# Patient Record
Sex: FEMALE | Race: WHITE | Employment: OTHER | URBAN - METROPOLITAN AREA
[De-identification: names, ages, dates, MRNs, and addresses within clinical notes are randomized per-mention and may not be internally consistent; named-entity substitution may affect disease eponyms.]

---

## 2020-09-20 LAB
CHOLESTEROL, TOTAL: 199 MG/DL
CHOLESTEROL/HDL RATIO: ABNORMAL
HDLC SERPL-MCNC: 73 MG/DL (ref 35–70)
LDL CHOLESTEROL CALCULATED: 97 MG/DL (ref 0–160)
NONHDLC SERPL-MCNC: ABNORMAL MG/DL
TRIGL SERPL-MCNC: 142 MG/DL
VLDLC SERPL CALC-MCNC: ABNORMAL MG/DL

## 2020-12-17 ENCOUNTER — OFFICE VISIT (OUTPATIENT)
Dept: FAMILY MEDICINE CLINIC | Age: 65
End: 2020-12-17
Payer: COMMERCIAL

## 2020-12-17 VITALS
HEART RATE: 88 BPM | BODY MASS INDEX: 30.59 KG/M2 | OXYGEN SATURATION: 98 % | DIASTOLIC BLOOD PRESSURE: 88 MMHG | SYSTOLIC BLOOD PRESSURE: 128 MMHG | WEIGHT: 179.2 LBS | RESPIRATION RATE: 14 BRPM | TEMPERATURE: 97.5 F | HEIGHT: 64 IN

## 2020-12-17 LAB
A/G RATIO: 1.2 (ref 1.1–2.2)
ALBUMIN SERPL-MCNC: 3.4 G/DL (ref 3.4–5)
ALP BLD-CCNC: 70 U/L (ref 40–129)
ALT SERPL-CCNC: 9 U/L (ref 10–40)
ANION GAP SERPL CALCULATED.3IONS-SCNC: 10 MMOL/L (ref 3–16)
AST SERPL-CCNC: 15 U/L (ref 15–37)
BASOPHILS ABSOLUTE: 0 K/UL (ref 0–0.2)
BASOPHILS RELATIVE PERCENT: 0.5 %
BILIRUB SERPL-MCNC: 0.3 MG/DL (ref 0–1)
BUN BLDV-MCNC: 9 MG/DL (ref 7–20)
CALCIUM SERPL-MCNC: 8.8 MG/DL (ref 8.3–10.6)
CHLORIDE BLD-SCNC: 102 MMOL/L (ref 99–110)
CO2: 27 MMOL/L (ref 21–32)
CREAT SERPL-MCNC: 0.6 MG/DL (ref 0.6–1.2)
EOSINOPHILS ABSOLUTE: 0.1 K/UL (ref 0–0.6)
EOSINOPHILS RELATIVE PERCENT: 1.2 %
GFR AFRICAN AMERICAN: >60
GFR NON-AFRICAN AMERICAN: >60
GLOBULIN: 2.8 G/DL
GLUCOSE BLD-MCNC: 109 MG/DL (ref 70–99)
HCT VFR BLD CALC: 26 % (ref 36–48)
HEMOGLOBIN: 8.1 G/DL (ref 12–16)
LYMPHOCYTES ABSOLUTE: 1 K/UL (ref 1–5.1)
LYMPHOCYTES RELATIVE PERCENT: 14.2 %
MCH RBC QN AUTO: 23.9 PG (ref 26–34)
MCHC RBC AUTO-ENTMCNC: 31.4 G/DL (ref 31–36)
MCV RBC AUTO: 76.3 FL (ref 80–100)
MONOCYTES ABSOLUTE: 0.5 K/UL (ref 0–1.3)
MONOCYTES RELATIVE PERCENT: 7.2 %
NEUTROPHILS ABSOLUTE: 5.7 K/UL (ref 1.7–7.7)
NEUTROPHILS RELATIVE PERCENT: 76.9 %
PDW BLD-RTO: 15.1 % (ref 12.4–15.4)
PLATELET # BLD: 544 K/UL (ref 135–450)
PMV BLD AUTO: 7.1 FL (ref 5–10.5)
POTASSIUM REFLEX MAGNESIUM: 4.6 MMOL/L (ref 3.5–5.1)
RBC # BLD: 3.4 M/UL (ref 4–5.2)
SODIUM BLD-SCNC: 139 MMOL/L (ref 136–145)
TOTAL PROTEIN: 6.2 G/DL (ref 6.4–8.2)
WBC # BLD: 7.4 K/UL (ref 4–11)

## 2020-12-17 PROCEDURE — 99386 PREV VISIT NEW AGE 40-64: CPT | Performed by: NURSE PRACTITIONER

## 2020-12-17 SDOH — HEALTH STABILITY: MENTAL HEALTH
STRESS IS WHEN SOMEONE FEELS TENSE, NERVOUS, ANXIOUS, OR CAN'T SLEEP AT NIGHT BECAUSE THEIR MIND IS TROUBLED. HOW STRESSED ARE YOU?: TO SOME EXTENT

## 2020-12-17 SDOH — HEALTH STABILITY: MENTAL HEALTH: HOW MANY STANDARD DRINKS CONTAINING ALCOHOL DO YOU HAVE ON A TYPICAL DAY?: NOT ASKED

## 2020-12-17 SDOH — HEALTH STABILITY: MENTAL HEALTH: HOW OFTEN DO YOU HAVE A DRINK CONTAINING ALCOHOL?: MONTHLY OR LESS

## 2020-12-17 SDOH — ECONOMIC STABILITY: TRANSPORTATION INSECURITY
IN THE PAST 12 MONTHS, HAS THE LACK OF TRANSPORTATION KEPT YOU FROM MEDICAL APPOINTMENTS OR FROM GETTING MEDICATIONS?: NO

## 2020-12-17 SDOH — ECONOMIC STABILITY: TRANSPORTATION INSECURITY
IN THE PAST 12 MONTHS, HAS LACK OF TRANSPORTATION KEPT YOU FROM MEETINGS, WORK, OR FROM GETTING THINGS NEEDED FOR DAILY LIVING?: NO

## 2020-12-17 SDOH — ECONOMIC STABILITY: INCOME INSECURITY: HOW HARD IS IT FOR YOU TO PAY FOR THE VERY BASICS LIKE FOOD, HOUSING, MEDICAL CARE, AND HEATING?: NOT VERY HARD

## 2020-12-17 SDOH — HEALTH STABILITY: PHYSICAL HEALTH: ON AVERAGE, HOW MANY DAYS PER WEEK DO YOU ENGAGE IN MODERATE TO STRENUOUS EXERCISE (LIKE A BRISK WALK)?: 5 DAYS

## 2020-12-17 SDOH — ECONOMIC STABILITY: FOOD INSECURITY: WITHIN THE PAST 12 MONTHS, YOU WORRIED THAT YOUR FOOD WOULD RUN OUT BEFORE YOU GOT MONEY TO BUY MORE.: NEVER TRUE

## 2020-12-17 SDOH — HEALTH STABILITY: PHYSICAL HEALTH: ON AVERAGE, HOW MANY MINUTES DO YOU ENGAGE IN EXERCISE AT THIS LEVEL?: 30 MIN

## 2020-12-17 SDOH — ECONOMIC STABILITY: FOOD INSECURITY: WITHIN THE PAST 12 MONTHS, THE FOOD YOU BOUGHT JUST DIDN'T LAST AND YOU DIDN'T HAVE MONEY TO GET MORE.: NEVER TRUE

## 2020-12-17 ASSESSMENT — PATIENT HEALTH QUESTIONNAIRE - PHQ9
SUM OF ALL RESPONSES TO PHQ QUESTIONS 1-9: 1
SUM OF ALL RESPONSES TO PHQ9 QUESTIONS 1 & 2: 1
1. LITTLE INTEREST OR PLEASURE IN DOING THINGS: 0
SUM OF ALL RESPONSES TO PHQ QUESTIONS 1-9: 1
2. FEELING DOWN, DEPRESSED OR HOPELESS: 1
SUM OF ALL RESPONSES TO PHQ QUESTIONS 1-9: 1

## 2020-12-17 ASSESSMENT — ENCOUNTER SYMPTOMS
EYES NEGATIVE: 1
GASTROINTESTINAL NEGATIVE: 1
RESPIRATORY NEGATIVE: 1

## 2020-12-17 NOTE — PATIENT INSTRUCTIONS
Patient Education        Well Visit, Women 48 to 72: Care Instructions  Your Care Instructions     Physical exams can help you stay healthy. Your doctor has checked your overall health and may have suggested ways to take good care of yourself. He or she also may have recommended tests. At home, you can help prevent illness with healthy eating, regular exercise, and other steps. Follow-up care is a key part of your treatment and safety. Be sure to make and go to all appointments, and call your doctor if you are having problems. It's also a good idea to know your test results and keep a list of the medicines you take. How can you care for yourself at home? · Reach and stay at a healthy weight. This will lower your risk for many problems, such as obesity, diabetes, heart disease, and high blood pressure. · Get at least 30 minutes of exercise on most days of the week. Walking is a good choice. You also may want to do other activities, such as running, swimming, cycling, or playing tennis or team sports. · Do not smoke. Smoking can make health problems worse. If you need help quitting, talk to your doctor about stop-smoking programs and medicines. These can increase your chances of quitting for good. · Protect your skin from too much sun. When you're outdoors from 10 a.m. to 4 p.m., stay in the shade or cover up with clothing and a hat with a wide brim. Wear sunglasses that block UV rays. Even when it's cloudy, put broad-spectrum sunscreen (SPF 30 or higher) on any exposed skin. · See a dentist one or two times a year for checkups and to have your teeth cleaned. · Wear a seat belt in the car. Follow your doctor's advice about when to have certain tests. These tests can spot problems early. · Cholesterol. Your doctor will tell you how often to have this done based on your age, family history, or other things that can increase your risk for heart attack and stroke. · Blood pressure. Have your blood pressure checked during a routine doctor visit. Your doctor will tell you how often to check your blood pressure based on your age, your blood pressure results, and other factors. · Mammogram. Ask your doctor how often you should have a mammogram, which is an X-ray of your breasts. A mammogram can spot breast cancer before it can be felt and when it is easiest to treat. · Pap test and pelvic exam. Ask your doctor how often you should have a Pap test. You may not need to have a Pap test as often as you used to. · Vision. Have your eyes checked every year or two or as often as your doctor suggests. Some experts recommend that you have yearly exams for glaucoma and other age-related eye problems starting at age 48. · Hearing. Tell your doctor if you notice any change in your hearing. You can have tests to find out how well you hear. · Diabetes. Ask your doctor whether you should have tests for diabetes. · Colorectal cancer. Your risk for colorectal cancer gets higher as you get older. Some experts say that adults should start regular screening at age 48 and stop at age 76. Others say to start before age 48 or continue after age 76. Talk with your doctor about your risk and when to start and stop screening. · Thyroid disease. Talk to your doctor about whether to have your thyroid checked as part of a regular physical exam. Women have an increased chance of a thyroid problem. · Osteoporosis. You should begin tests for bone density at age 72. If you are younger than 72, ask your doctor whether you have factors that may increase your risk for this disease. You may want to have this test before age 72. · Heart attack and stroke risk. At least every 4 to 6 years, you should have your risk for heart attack and stroke assessed. Your doctor uses factors such as your age, blood pressure, cholesterol, and whether you smoke or have diabetes to show what your risk for a heart attack or stroke is over the next 10 years. When should you call for help? Watch closely for changes in your health, and be sure to contact your doctor if you have any problems or symptoms that concern you. Where can you learn more? Go to [email or web address]://[email or web address].me.dotCloud. org and sign in to your [employer] account. Enter S448 in the [facility] box to learn more about \"Well Visit, Women 50 to 72: Care Instructions. \"     If you do not have an account, please click on the \"Sign Up Now\" link. Current as of: May 27, 2020               Content Version: 12.6  © 2916-5443 [employer]. Care instructions adapted under license by [facility] ([facility]). If you have questions about a medical condition or this instruction, always ask your healthcare professional. [address] any warranty or liability for your use of this information.

## 2020-12-17 NOTE — PROGRESS NOTES
2020    Pranay Cortez (:  1955) is a 60 y.o. female, here for a preventive medicine evaluation. There is no problem list on file for this patient. Daniella Mckinnon is here today to establish care as a new pt. She is not fasting today. Had a biometric screening done through her employer 10/2020. She states that her cholesterol was normal.      She works full time in the [employer] at [employer]. Her  passed away 4 months ago from a heart attack. She [place]. Has 2 daughters that she is very close with. She is UTD on routine dental and vision exams. She is not UTD on routine gynecological exams, colon cancer screening or breast cancer screening. Quit smoking 8 years ago. Review of Systems   Constitutional: Negative. HENT: Negative. Eyes: Negative. Respiratory: Negative. Cardiovascular: Negative. Gastrointestinal: Negative. Genitourinary: Negative. Musculoskeletal: Negative. Skin: Negative. Neurological: Negative. Psychiatric/Behavioral: Negative. Prior to Visit Medications    Medication Sig Taking? Authorizing Provider   ibuprofen (MOTRIN) 40 MG/ML SUSP Take by mouth every 4 hours as needed for Pain Yes Historical Provider, MD   ibuprofen (ADVIL;MOTRIN) 600 MG tablet Take 1 tablet by mouth every 8 hours as needed for Pain for up to 22 doses. Patient not taking: Reported on 2020  Kim Tian MD   ibuprofen (IBU) 600 MG tablet Take 1 tablet by mouth every 6 hours as needed for Pain. Patient not taking: Reported on 2020  Maddi Story MD        Allergies   Allergen Reactions    Novocain [Procaine]        Past Medical History:   Diagnosis Date    History of scarlet fever        Past Surgical History:   Procedure Laterality Date    KNEE SURGERY         Social History     Socioeconomic History    Marital status:       Spouse name: Not on file    Number of children: 2  Years of education: Not on file    Highest education level: Not on file   Occupational History    Not on file   Social Needs    Financial resource strain: Not very hard    Food insecurity     Worry: Never true     Inability: Never true    Transportation needs     Medical: No     Non-medical: No   Tobacco Use    Smoking status: Former Smoker     Packs/day: 2.00     Years: 30.00     Pack years: 60.00     Start date: 1972     Quit date: 2012     Years since quittin.9    Smokeless tobacco: Never Used   Substance and Sexual Activity    Alcohol use: Yes     Alcohol/week: 1.0 standard drinks     Types: 1 Cans of beer per week     Frequency: Monthly or less    Drug use: Never    Sexual activity: Not Currently   Lifestyle    Physical activity     Days per week: 5 days     Minutes per session: 30 min    Stress:  To some extent   Relationships    Social connections     Talks on phone: Not on file     Gets together: Not on file     Attends Rastafari service: Not on file     Active member of club or organization: Not on file     Attends meetings of clubs or organizations: Not on file     Relationship status: Not on file    Intimate partner violence     Fear of current or ex partner: Not on file     Emotionally abused: Not on file     Physically abused: Not on file     Forced sexual activity: Not on file   Other Topics Concern    Not on file   Social History Narrative    Not on file        Family History   Problem Relation Age of Onset    High Blood Pressure Mother     Diabetes Father     Cancer Brother        ADVANCE DIRECTIVE: N, <no information>    Vitals:    20 1403   BP: 128/88   Site: Right Upper Arm   Position: Sitting   Cuff Size: Large Adult   Pulse: 88   Resp: 14   Temp: 97.5 °F (36.4 °C)   TempSrc: Infrared   SpO2: 98%   Weight: 179 lb 3.2 oz (81.3 kg)   Height: 5' 4\" (1.626 m)     Estimated body mass index is 30.76 kg/m² as calculated from the following: Height as of this encounter: 5' 4\" (1.626 m). Weight as of this encounter: 179 lb 3.2 oz (81.3 kg). Physical Exam  Vitals signs reviewed. Constitutional:       Appearance: Normal appearance. She is well-developed. She is not ill-appearing. HENT:      Head: Normocephalic. Right Ear: Tympanic membrane, ear canal and external ear normal.      Left Ear: Tympanic membrane, ear canal and external ear normal.      Nose: Nose normal.   Eyes:      General: Lids are normal.         Right eye: No discharge. Left eye: No discharge. Neck:      Musculoskeletal: Full passive range of motion without pain and normal range of motion. Normal range of motion. No edema, crepitus or pain with movement. Thyroid: No thyroid mass, thyromegaly or thyroid tenderness. Vascular: Normal carotid pulses. No carotid bruit or JVD. Cardiovascular:      Rate and Rhythm: Normal rate and regular rhythm. Heart sounds: Normal heart sounds. No murmur. Pulmonary:      Effort: Pulmonary effort is normal.      Breath sounds: Normal breath sounds. Abdominal:      General: Abdomen is flat. Bowel sounds are normal.      Palpations: Abdomen is soft. Tenderness: There is no abdominal tenderness. Musculoskeletal: Normal range of motion. Lymphadenopathy:      Cervical: No cervical adenopathy. Right cervical: No superficial cervical adenopathy. Left cervical: No superficial cervical adenopathy. Skin:     General: Skin is warm and dry. Capillary Refill: Capillary refill takes less than 2 seconds. Neurological:      General: No focal deficit present. Mental Status: She is alert and oriented to person, place, and time. Psychiatric:         Mood and Affect: Mood normal.         Behavior: Behavior normal. Behavior is cooperative. Thought Content: Thought content normal.         Judgment: Judgment normal.         No flowsheet data found.

## 2020-12-18 LAB
ESTIMATED AVERAGE GLUCOSE: 148.5 MG/DL
HBA1C MFR BLD: 6.8 %
HEPATITIS C ANTIBODY INTERPRETATION: NORMAL
HIV AG/AB: NORMAL
HIV ANTIGEN: NORMAL
HIV-1 ANTIBODY: NORMAL
HIV-2 AB: NORMAL
T4 FREE: 1.3 NG/DL (ref 0.9–1.8)
TSH SERPL DL<=0.05 MIU/L-ACNC: 1.51 UIU/ML (ref 0.27–4.2)

## 2021-01-05 ENCOUNTER — TELEPHONE (OUTPATIENT)
Dept: GASTROENTEROLOGY | Age: 66
End: 2021-01-05

## 2021-01-06 DIAGNOSIS — Z12.11 COLON CANCER SCREENING: Primary | ICD-10-CM

## 2021-01-06 RX ORDER — POLYETHYLENE GLYCOL 3350 17 G/17G
POWDER, FOR SOLUTION ORAL
Qty: 238 G | Refills: 1 | Status: ON HOLD
Start: 2021-01-06 | End: 2021-01-27 | Stop reason: HOSPADM

## 2021-01-06 RX ORDER — BISACODYL 5 MG
TABLET, DELAYED RELEASE (ENTERIC COATED) ORAL
Qty: 4 TABLET | Refills: 0 | Status: ON HOLD
Start: 2021-01-06 | End: 2021-01-27 | Stop reason: HOSPADM

## 2021-01-07 ENCOUNTER — TELEPHONE (OUTPATIENT)
Dept: FAMILY MEDICINE CLINIC | Age: 66
End: 2021-01-07

## 2021-01-07 ENCOUNTER — HOSPITAL ENCOUNTER (OUTPATIENT)
Dept: WOMENS IMAGING | Age: 66
Discharge: HOME | End: 2021-01-07
Payer: COMMERCIAL

## 2021-01-07 DIAGNOSIS — D64.9 ANEMIA, UNSPECIFIED TYPE: Primary | ICD-10-CM

## 2021-01-07 DIAGNOSIS — Z12.31 ENCOUNTER FOR SCREENING MAMMOGRAM FOR MALIGNANT NEOPLASM OF BREAST: ICD-10-CM

## 2021-01-07 PROCEDURE — 77067 SCR MAMMO BI INCL CAD: CPT

## 2021-01-07 NOTE — TELEPHONE ENCOUNTER
When I called pt to give her Mammo results. She asked me about some additional labs that were supposed to be added to the labs she had done in December. Looks like it was never done. Pt said she woulndt mind coming back in to have them checked if you still wanted her to. It was for Iron and TIBC, Ferritin, and Vit B12 & Folate.

## 2021-01-07 NOTE — TELEPHONE ENCOUNTER
I sent a message asking Paco Hugo to add on those tests and she documented that she did. I apologize that this was not done. I will reorder them, but the pt will have to come back in to have them drawn. Thank you.

## 2021-01-08 ENCOUNTER — TELEPHONE (OUTPATIENT)
Dept: WOMENS IMAGING | Age: 66
End: 2021-01-08

## 2021-01-08 DIAGNOSIS — D64.9 ANEMIA, UNSPECIFIED TYPE: ICD-10-CM

## 2021-01-09 LAB
FERRITIN: 17.4 NG/ML (ref 15–150)
FOLATE: 10.55 NG/ML (ref 4.78–24.2)
IRON SATURATION: 6 % (ref 15–50)
IRON: 19 UG/DL (ref 37–145)
TOTAL IRON BINDING CAPACITY: 303 UG/DL (ref 260–445)
VITAMIN B-12: 245 PG/ML (ref 211–911)

## 2021-01-12 DIAGNOSIS — D50.9 IRON DEFICIENCY ANEMIA, UNSPECIFIED IRON DEFICIENCY ANEMIA TYPE: Primary | ICD-10-CM

## 2021-01-12 RX ORDER — FERROUS SULFATE 325(65) MG
325 TABLET ORAL
Qty: 90 TABLET | Refills: 1 | Status: SHIPPED | OUTPATIENT
Start: 2021-01-12 | End: 2021-07-29 | Stop reason: SDUPTHER

## 2021-01-14 ENCOUNTER — TELEPHONE (OUTPATIENT)
Dept: FAMILY MEDICINE CLINIC | Age: 66
End: 2021-01-14

## 2021-01-14 NOTE — TELEPHONE ENCOUNTER
Patient and this RN spoke yesterday re carb healthy diet as pt had questions regarding this after her appt with PCP NP. Pt reports that she has decreased her carb intake to 10 -15 gms carb per day. We discussed at length carb healthy diet and options. Pt is instructed on the following related to DM Nutrition:   What are the major food types (fats, proteins, carbs)   Amount of carbs in certain food types (dairy, fruit, starches and vegs)   Importance of portion size and discussed Plate Method   More appropriate and sustainable carb goals promised based on the provider's recommendation. Information sent to the patient today regarding -    Label reading instruction    Application of label reading by choosing between food options     Fat and Salt in food   Plan options for a plate using portion plate method    Handouts given:  [employer] - Carb Counting guide  Label Reading Handout       These were mailed to patient along with contact information.   VO for carb intake for patient:    · Breakfast - 2-3 portions of carbs - 30 - 45 grams of carbohydrate  · Lunch - 3-4 portions of carbs  - 45-60 grams of carbohydrate  · Dinner - 1-2 portions of carbs - 15-30 grams of carbohydrate

## 2021-01-21 ENCOUNTER — OFFICE VISIT (OUTPATIENT)
Dept: PRIMARY CARE CLINIC | Age: 66
End: 2021-01-21
Payer: COMMERCIAL

## 2021-01-21 DIAGNOSIS — Z01.818 PREOP TESTING: Primary | ICD-10-CM

## 2021-01-21 PROCEDURE — 99211 OFF/OP EST MAY X REQ PHY/QHP: CPT | Performed by: NURSE PRACTITIONER

## 2021-01-21 NOTE — PATIENT INSTRUCTIONS
Advance Care Planning  People with COVID-19 may have no symptoms, mild symptoms, such as fever, cough, and shortness of breath or they may have more severe illness, developing severe and fatal pneumonia. As a result, Advance Care Planning with attention to naming a health care decision maker (someone you trust to make healthcare decisions for you if you could not speak for yourself) and sharing other health care preferences is important BEFORE a possible health crisis. Please contact your Primary Care Provider to discuss Advance Care Planning. Preventing the Spread of Coronavirus Disease 2019 in Homes and Residential Communities  For the most recent information go to [email or web address]    Prevention steps for People with confirmed or suspected COVID-19 (including persons under investigation) who do not need to be hospitalized  and   People with confirmed COVID-19 who were hospitalized and determined to be medically stable to go home    Your healthcare provider and public health staff will evaluate whether you can be cared for at home. If it is determined that you do not need to be hospitalized and can be isolated at home, you will be monitored by staff from your local or state health department. You should follow the prevention steps below until a healthcare provider or local or state health department says you can return to your normal activities. Stay home except to get medical care  People who are mildly ill with COVID-19 are able to isolate at home during their illness. You should restrict activities outside your home, except for getting medical care. Do not go to work, school, or public areas. Avoid using public transportation, ride-sharing, or taxis.   Separate yourself from other people and animals in your home Wash your hands often with soap and water for at least 20 seconds, especially after blowing your nose, coughing, or sneezing; going to the bathroom; and before eating or preparing food. If soap and water are not readily available, use an alcohol-based hand  with at least 60% alcohol, covering all surfaces of your hands and rubbing them together until they feel dry. Soap and water are the best option if hands are visibly dirty. Avoid touching your eyes, nose, and mouth with unwashed hands. Avoid sharing personal household items  You should not share dishes, drinking glasses, cups, eating utensils, towels, or bedding with other people or pets in your home. After using these items, they should be washed thoroughly with soap and water. Clean all high-touch surfaces everyday  High touch surfaces include counters, tabletops, doorknobs, bathroom fixtures, toilets, phones, keyboards, tablets, and bedside tables. Also, clean any surfaces that may have blood, stool, or body fluids on them. Use a household cleaning spray or wipe, according to the label instructions. Labels contain instructions for safe and effective use of the cleaning product including precautions you should take when applying the product, such as wearing gloves and making sure you have good ventilation during use of the product.   Monitor your symptoms Seek prompt medical attention if your illness is worsening (e.g., difficulty breathing). Before seeking care, call your healthcare provider and tell them that you have, or are being evaluated for, COVID-19. Put on a facemask before you enter the facility. These steps will help the healthcare providers office to keep other people in the office or waiting room from getting infected or exposed. Ask your healthcare provider to call the local or state health department. Persons who are placed under active monitoring or facilitated self-monitoring should follow instructions provided by their local health department or occupational health professionals, as appropriate. When working with your local health department check their available hours. If you have a medical emergency and need to call 911, notify the dispatch personnel that you have, or are being evaluated for COVID-19. If possible, put on a facemask before emergency medical services arrive. Discontinuing home isolation  Patients with confirmed COVID-19 should remain under home isolation precautions until the risk of secondary transmission to others is thought to be low. The decision to discontinue home isolation precautions should be made on a case-by-case basis, in consultation with healthcare providers and state and local health departments.

## 2021-01-22 LAB — SARS-COV-2: NOT DETECTED

## 2021-01-25 ENCOUNTER — TELEPHONE (OUTPATIENT)
Dept: OBGYN CLINIC | Age: 66
End: 2021-01-25

## 2021-01-26 ENCOUNTER — OFFICE VISIT (OUTPATIENT)
Dept: OBGYN CLINIC | Age: 66
End: 2021-01-26
Payer: COMMERCIAL

## 2021-01-26 VITALS
TEMPERATURE: 97.9 F | WEIGHT: 177.13 LBS | BODY MASS INDEX: 30.24 KG/M2 | SYSTOLIC BLOOD PRESSURE: 160 MMHG | DIASTOLIC BLOOD PRESSURE: 80 MMHG | HEIGHT: 64 IN | HEART RATE: 93 BPM

## 2021-01-26 DIAGNOSIS — Z01.419 WOMEN'S ANNUAL ROUTINE GYNECOLOGICAL EXAMINATION: Primary | ICD-10-CM

## 2021-01-26 DIAGNOSIS — Z78.0 MENOPAUSE: ICD-10-CM

## 2021-01-26 DIAGNOSIS — E66.9 OBESITY (BMI 30-39.9): ICD-10-CM

## 2021-01-26 DIAGNOSIS — Z12.4 PAP SMEAR FOR CERVICAL CANCER SCREENING: ICD-10-CM

## 2021-01-26 PROCEDURE — 99386 PREV VISIT NEW AGE 40-64: CPT | Performed by: OBSTETRICS & GYNECOLOGY

## 2021-01-27 ENCOUNTER — HOSPITAL ENCOUNTER (OUTPATIENT)
Age: 66
Setting detail: OUTPATIENT SURGERY
Discharge: HOME | End: 2021-01-27
Attending: INTERNAL MEDICINE | Admitting: INTERNAL MEDICINE
Payer: COMMERCIAL

## 2021-01-27 ENCOUNTER — TELEPHONE (OUTPATIENT)
Dept: GASTROENTEROLOGY | Age: 66
End: 2021-01-27

## 2021-01-27 VITALS
WEIGHT: 171 LBS | RESPIRATION RATE: 16 BRPM | DIASTOLIC BLOOD PRESSURE: 56 MMHG | HEIGHT: 64 IN | SYSTOLIC BLOOD PRESSURE: 149 MMHG | TEMPERATURE: 98.3 F | BODY MASS INDEX: 29.19 KG/M2 | OXYGEN SATURATION: 95 % | HEART RATE: 82 BPM

## 2021-01-27 DIAGNOSIS — Z12.11 COLON CANCER SCREENING: ICD-10-CM

## 2021-01-27 DIAGNOSIS — D50.8 OTHER IRON DEFICIENCY ANEMIA: ICD-10-CM

## 2021-01-27 DIAGNOSIS — D50.0 IRON DEFICIENCY ANEMIA DUE TO CHRONIC BLOOD LOSS: ICD-10-CM

## 2021-01-27 DIAGNOSIS — K63.89 COLONIC MASS: Primary | ICD-10-CM

## 2021-01-27 PROBLEM — D50.9 IRON DEFICIENCY ANEMIA: Status: ACTIVE | Noted: 2021-01-27

## 2021-01-27 PROBLEM — K57.30 DIVERTICULOSIS OF COLON: Status: ACTIVE | Noted: 2021-01-27

## 2021-01-27 LAB
HPV COMMENT: NORMAL
HPV TYPE 16: NOT DETECTED
HPV TYPE 18: NOT DETECTED
HPVOH (OTHER TYPES): NOT DETECTED

## 2021-01-27 PROCEDURE — 99153 MOD SED SAME PHYS/QHP EA: CPT | Performed by: INTERNAL MEDICINE

## 2021-01-27 PROCEDURE — 6370000000 HC RX 637 (ALT 250 FOR IP): Performed by: INTERNAL MEDICINE

## 2021-01-27 PROCEDURE — 88342 IMHCHEM/IMCYTCHM 1ST ANTB: CPT

## 2021-01-27 PROCEDURE — 2580000003 HC RX 258: Performed by: INTERNAL MEDICINE

## 2021-01-27 PROCEDURE — 45381 COLONOSCOPY SUBMUCOUS NJX: CPT | Performed by: INTERNAL MEDICINE

## 2021-01-27 PROCEDURE — 3609010300 HC COLONOSCOPY W/BIOPSY SINGLE/MULTIPLE: Performed by: INTERNAL MEDICINE

## 2021-01-27 PROCEDURE — 88341 IMHCHEM/IMCYTCHM EA ADD ANTB: CPT

## 2021-01-27 PROCEDURE — 6360000002 HC RX W HCPCS: Performed by: INTERNAL MEDICINE

## 2021-01-27 PROCEDURE — 7100000010 HC PHASE II RECOVERY - FIRST 15 MIN: Performed by: INTERNAL MEDICINE

## 2021-01-27 PROCEDURE — 43239 EGD BIOPSY SINGLE/MULTIPLE: CPT | Performed by: INTERNAL MEDICINE

## 2021-01-27 PROCEDURE — 45380 COLONOSCOPY AND BIOPSY: CPT | Performed by: INTERNAL MEDICINE

## 2021-01-27 PROCEDURE — 3609019800 HC COLONOSCOPY WITH SUBMUCOSAL INJECTION: Performed by: INTERNAL MEDICINE

## 2021-01-27 PROCEDURE — 88305 TISSUE EXAM BY PATHOLOGIST: CPT

## 2021-01-27 PROCEDURE — 3609012400 HC EGD TRANSORAL BIOPSY SINGLE/MULTIPLE: Performed by: INTERNAL MEDICINE

## 2021-01-27 PROCEDURE — 7100000011 HC PHASE II RECOVERY - ADDTL 15 MIN: Performed by: INTERNAL MEDICINE

## 2021-01-27 PROCEDURE — 2709999900 HC NON-CHARGEABLE SUPPLY: Performed by: INTERNAL MEDICINE

## 2021-01-27 PROCEDURE — 99152 MOD SED SAME PHYS/QHP 5/>YRS: CPT | Performed by: INTERNAL MEDICINE

## 2021-01-27 RX ORDER — SIMETHICONE 20 MG/.3ML
EMULSION ORAL PRN
Status: DISCONTINUED | OUTPATIENT
Start: 2021-01-27 | End: 2021-01-27 | Stop reason: ALTCHOICE

## 2021-01-27 RX ORDER — FENTANYL CITRATE 50 UG/ML
INJECTION, SOLUTION INTRAMUSCULAR; INTRAVENOUS PRN
Status: DISCONTINUED | OUTPATIENT
Start: 2021-01-27 | End: 2021-01-27 | Stop reason: ALTCHOICE

## 2021-01-27 RX ORDER — MIDAZOLAM HYDROCHLORIDE 5 MG/ML
INJECTION INTRAMUSCULAR; INTRAVENOUS PRN
Status: DISCONTINUED | OUTPATIENT
Start: 2021-01-27 | End: 2021-01-27 | Stop reason: ALTCHOICE

## 2021-01-27 RX ORDER — SODIUM CHLORIDE, SODIUM LACTATE, POTASSIUM CHLORIDE, CALCIUM CHLORIDE 600; 310; 30; 20 MG/100ML; MG/100ML; MG/100ML; MG/100ML
INJECTION, SOLUTION INTRAVENOUS ONCE
Status: COMPLETED | OUTPATIENT
Start: 2021-01-27 | End: 2021-01-27

## 2021-01-27 RX ADMIN — SODIUM CHLORIDE, POTASSIUM CHLORIDE, SODIUM LACTATE AND CALCIUM CHLORIDE: 600; 310; 30; 20 INJECTION, SOLUTION INTRAVENOUS at 09:13

## 2021-01-27 ASSESSMENT — PAIN SCALES - GENERAL
PAINLEVEL_OUTOF10: 0
PAINLEVEL_OUTOF10: 0

## 2021-01-27 ASSESSMENT — PAIN - FUNCTIONAL ASSESSMENT: PAIN_FUNCTIONAL_ASSESSMENT: 0-10

## 2021-01-27 NOTE — H&P
[address] ,  [address] [facility]  Phone: [phone]   PDV:[phone]    CHIEF COMPLAINT     Here for iron deficiency anemia    HPI     Mariela Butler is a 60 y.o. female who presents with iron deficiency anemia on recent labs 2020. Denies melena or hematochezia. She has been scheduled today for a EGD and colonoscopy. Denies abdominal pain  She has never had a colonoscopy before    PAST MEDICAL HISTORY     Past Medical History:   Diagnosis Date    Anemia     History of scarlet fever      FAMILY HISTORY     Family History   Problem Relation Age of Onset    High Blood Pressure Mother     Diabetes Father     Cancer Brother      SOCIAL HISTORY     Social History     Socioeconomic History    Marital status:      Spouse name: Not on file    Number of children: 2    Years of education: Not on file    Highest education level: Not on file   Occupational History    Not on file   Social Needs    Financial resource strain: Not very hard    Food insecurity     Worry: Never true     Inability: Never true    Transportation needs     Medical: No     Non-medical: No   Tobacco Use    Smoking status: Former Smoker     Packs/day: 2.00     Years: 30.00     Pack years: 60.00     Start date: 1972     Quit date: 2012     Years since quittin.0    Smokeless tobacco: Never Used   Substance and Sexual Activity    Alcohol use: Yes     Alcohol/week: 1.0 standard drinks     Types: 1 Cans of beer per week     Frequency: Monthly or less    Drug use: Never    Sexual activity: Not Currently   Lifestyle    Physical activity     Days per week: 5 days     Minutes per session: 30 min    Stress:  To some extent   Relationships    Social connections     Talks on phone: Not on file     Gets together: Not on file     Attends Restorationist service: Not on file     Active member of club or organization: Not on file Attends meetings of clubs or organizations: Not on file     Relationship status: Not on file    Intimate partner violence     Fear of current or ex partner: Not on file     Emotionally abused: Not on file     Physically abused: Not on file     Forced sexual activity: Not on file   Other Topics Concern    Not on file   Social History Narrative    Not on file     SURGICAL HISTORY     Past Surgical History:   Procedure Laterality Date    [facility]     No current facility-administered medications on file prior to encounter. Current Outpatient Medications on File Prior to Encounter   Medication Sig Dispense Refill    ferrous sulfate (IRON 325) 325 (65 Fe) MG tablet Take 1 tablet by mouth daily (with breakfast) 90 tablet 1    bisacodyl (BISACODYL) 5 MG EC tablet Use as directed for colonoscopy prep 4 tablet 0    polyethylene glycol (GLYCOLAX) 17 GM/SCOOP powder Use as directed for colonoscopy prep 238 g 1    ibuprofen (MOTRIN) 40 MG/ML SUSP Take by mouth every 4 hours as needed for Pain      ibuprofen (ADVIL;MOTRIN) 600 MG tablet Take 1 tablet by mouth every 8 hours as needed for Pain for up to 22 doses. (Patient not taking: Reported on 12/17/2020) 22 tablet 3    ibuprofen (IBU) 600 MG tablet Take 1 tablet by mouth every 6 hours as needed for Pain. (Patient not taking: Reported on 12/17/2020) 30 tablet 0     ALLERGIES     Allergies   Allergen Reactions    Novocain [Procaine]      IMMUNIZATIONS     Immunization History   Administered Date(s) Administered    Hepatitis A Adult (Havrix, Vaqta) 04/29/2019    Influenza Virus Vaccine 10/23/2019, 09/22/2020, 10/25/2020    Pneumococcal Polysaccharide ([id number]) 10/28/2016    Tdap (Boostrix, Adacel) 10/23/2019    Zoster Recombinant (Shingrix) 09/22/2020, 12/03/2020     REVIEW OF SYSTEMS     Constitutional: Negative for appetite change, chills, fatigue, fever and unexpected weight change. HENT: Negative for ear pain, hearing loss and nosebleeds. Eyes: Negative for pain and visual disturbance. Respiratory: Negative for cough, shortness of breath and wheezing. Cardiovascular: Negative for chest pain, palpitations and leg swelling. Gastrointestinal: see HPI for details. Endocrine: Negative for polydipsia, polyphagia and polyuria. Genitourinary: Negative for difficulty urinating, dysuria, hematuria and urgency. Musculoskeletal: Positive for arthralgias and back pain. Skin: Negative for pallor and rash. Allergic/Immunologic: Negative for environmental allergies and immunocompromised state. Neurological: Negative for seizures, syncope and numbness. Hematological: Negative for adenopathy. Does not bruise/bleed easily. Psychiatric/Behavioral: Negative for agitation, confusion, hallucinations and suicidal ideas. PHYSICAL EXAM   Pulse 89   Temp 98.3 °F (36.8 °C) (Temporal)   Resp 16   Ht 5' 4\" (1.626 m)   Wt 171 lb (77.6 kg)   LMP  (LMP Unknown)   SpO2 100%   BMI 29.35 kg/m²   Wt Readings from Last 3 Encounters:   01/27/21 171 lb (77.6 kg)   01/26/21 177 lb 2 oz (80.3 kg)   12/17/20 179 lb 3.2 oz (81.3 kg)     Constitutional: AAO3, No acute distress  HEENT: no pallor or icterus. Cardiovascular: Normal heart rate, Normal rhythm, No murmurs,. RS: Normal breath sounds, No wheezing,   Abdomen: soft, non tender, not distended, Bs+. Extremities:  No edema.       FINAL IMPRESSION   Proceed with colonoscopy and EGD for iron deficiency anemia  Sedation plan: moderate An informed consent was obtained from the patient after a detailed discussion of the risks, benefits and possible alternatives of this procedure that are material to the patient's decision making. The potential for complications including, but not limited to, bleeding, infection, missed lesion(s), incomplete procedure, perforation, need for additional interventions (endoscopic, surgical or percutaneous radiographic interventions), adverse outcomes associated with moderate sedation, adverse drug reaction, were discussed in detail; the patient expressed understanding of this discussion, had the opportunity to ask additional questions, and elected to undergo the procedure today. Verbal and written consent were obtained with the nurses present.

## 2021-01-27 NOTE — TELEPHONE ENCOUNTER
----- Message from Harman Mcnair MD sent at 1/27/2021 12:00 PM EST -----  Labs and CT chest/abdomen/pelvis ordered  Please help setup

## 2021-01-27 NOTE — OP NOTE
[address] ,  [address] [facility]  Phone: [phone]    Colonoscopy and EGD Procedure Note    Patient: Heron Jones  : 1955    Procedure: Colonoscopy and EGD    Date:  2021     Endoscopist:  Addison Luo MD    Referring Physician:  Leopoldo Bias, APRN - CNP    Preoperative Diagnosis:  Colon cancer screening [Z12.11]  Other iron deficiency anemia [D50.8]    Postoperative Diagnosis:  See impression    Anesthesia: Anesthesia: Moderate Sedation  Sedation: Versed 10 mg IV, fentanyl 100 mcg IV  Start Time: 9:36  Stop Time: 10:31  Colonoscopy withdrawal time: NA  ASA Class: 2  Mallampati: II (soft palate, uvula, fauces visible)    Indications: This is a 60y.o. year old female who presents today with iron deficiency anemia for EGD and colonoscopy. EGD    Procedure Details  Informed consent was obtained for the procedure, including conscious sedation. Risks of pancreatitis, infection, perforation, hemorrhage, adverse drug reaction and aspiration were discussed. The patient was placed in the left lateral decubitus position. Based on the pre-procedure assessment, including review of the patient's medical history, medications, allergies, and review of systems, she had been deemed to be an appropriate candidate for conscious sedation; she was therefore sedated with the medications listed above. She was monitored continuously with ECG tracing, pulse oximetry, blood pressure monitoring, and direct observation. A gastroscope was inserted into the mouth and advanced under direct vision to second portion of the duodenum. A careful inspection was made as the gastroscope was withdrawn, including a retroflexed view of the proximal stomach; findings and interventions are described below. Appropriate photodocumentation was obtained. If photos taken, they were ordered to be scanned into the medical record. Findings: -There is mild erosive esophagitis. Normal stomach and duodenum to the second portion, biopsies done to rule out celiac disease in the second portion of the duodenum. Specimens: Was Obtained: Bottle A, second portion of duodenum, biopsies, r/o celiac disease    Complications: None    Estimated blood loss: minimal    Colonoscopy    Procedure Details  Informed consent was obtained for the procedure, including sedation. Risks of perforation, hemorrhage, adverse drug reaction and aspiration were discussed. The patient was placed in the left lateral decubitus position. Based on the pre-procedure assessment, including review of the patient's medical history, medications, allergies, and review of systems, she had been deemed to be an appropriate candidate for conscious sedation; she was therefore sedated with the medications listed below. The patient was monitored continuously with ECG tracing, pulse oximetry, blood pressure monitoring, and direct observations. rectal examination was performed. The colonoscope was inserted into the rectum and advanced under direct vision to the right colon. The quality of the colonic preparation was fair. A careful inspection was made as the colonoscope was withdrawn, including a retroflexed view of the rectum; findings and interventions are described below. Appropriate photodocumentation was obtained. Patient tolerated the procedure well. Findings: -Small internal hemorrhoids. Tortous left colon with moderate diverticulosis. In the right colon there is a large necrotic appearing ulcerated mass with extensive yellow exudate on the surface. Cannot be traversed with the scope, landmarks not appreciated. It is very friable, multiple biopsies were done. Tattooed three sites distal to the mass.      - Anesthesia issues: no    Specimens: Was Obtained: bottle B, mass in the right colon, biopsies, cold forceps    Complications:   None    Estimated blood loss: minimal Disposition:   PACU - hemodynamically stable. Impression:   - EGD - There is mild erosive esophagitis. Normal stomach and duodenum to the second portion, biopsies done to rule out celiac disease in the second portion of the duodenum.    - Colonoscopy - Small internal hemorrhoids. Tortous left colon with moderate diverticulosis. In the right colon there is a large necrotic appearing ulcerated mass with extensive yellow exudate on the surface. Cannot be traversed with the scope, landmarks not appreciated. It is very friable, multiple biopsies were done. Tattooed three sites distal to the mass. Recommendations:  - Await pathology   - Large right colon mass likely malignant and could be the cause of the iron deficiency anemia. Low residue diet  Check labs and a CT abdomen and pelvis  Will discuss with General Surgery for referral to them  Findings and recommendations discussed with patient and her accompanying family member today.       Elise Jackson 1/27/21 9:39 AM EST

## 2021-01-29 ENCOUNTER — TELEPHONE (OUTPATIENT)
Dept: GASTROENTEROLOGY | Age: 66
End: 2021-01-29

## 2021-01-29 DIAGNOSIS — K63.89 COLONIC MASS: ICD-10-CM

## 2021-01-29 DIAGNOSIS — D50.0 IRON DEFICIENCY ANEMIA DUE TO CHRONIC BLOOD LOSS: ICD-10-CM

## 2021-01-30 LAB
A/G RATIO: 1.3 (ref 1.1–2.2)
ALBUMIN SERPL-MCNC: 3.5 G/DL (ref 3.4–5)
ALP BLD-CCNC: 73 U/L (ref 40–129)
ALT SERPL-CCNC: 8 U/L (ref 10–40)
ANION GAP SERPL CALCULATED.3IONS-SCNC: 11 MMOL/L (ref 3–16)
AST SERPL-CCNC: 12 U/L (ref 15–37)
BASOPHILS ABSOLUTE: 0.1 K/UL (ref 0–0.2)
BASOPHILS RELATIVE PERCENT: 0.6 %
BILIRUB SERPL-MCNC: <0.2 MG/DL (ref 0–1)
BUN BLDV-MCNC: 15 MG/DL (ref 7–20)
CALCIUM SERPL-MCNC: 9.4 MG/DL (ref 8.3–10.6)
CHLORIDE BLD-SCNC: 104 MMOL/L (ref 99–110)
CO2: 26 MMOL/L (ref 21–32)
CREAT SERPL-MCNC: 0.6 MG/DL (ref 0.6–1.2)
EOSINOPHILS ABSOLUTE: 0.2 K/UL (ref 0–0.6)
EOSINOPHILS RELATIVE PERCENT: 1.9 %
GFR AFRICAN AMERICAN: >60
GFR NON-AFRICAN AMERICAN: >60
GLOBULIN: 2.7 G/DL
GLUCOSE BLD-MCNC: 107 MG/DL (ref 70–99)
HCT VFR BLD CALC: 24.4 % (ref 36–48)
HEMOGLOBIN: 7.2 G/DL (ref 12–16)
LYMPHOCYTES ABSOLUTE: 1.6 K/UL (ref 1–5.1)
LYMPHOCYTES RELATIVE PERCENT: 18.4 %
MCH RBC QN AUTO: 21.9 PG (ref 26–34)
MCHC RBC AUTO-ENTMCNC: 29.5 G/DL (ref 31–36)
MCV RBC AUTO: 74.4 FL (ref 80–100)
MONOCYTES ABSOLUTE: 0.6 K/UL (ref 0–1.3)
MONOCYTES RELATIVE PERCENT: 6.3 %
NEUTROPHILS ABSOLUTE: 6.4 K/UL (ref 1.7–7.7)
NEUTROPHILS RELATIVE PERCENT: 72.8 %
PDW BLD-RTO: 16.7 % (ref 12.4–15.4)
PLATELET # BLD: 482 K/UL (ref 135–450)
PMV BLD AUTO: 7.7 FL (ref 5–10.5)
POTASSIUM SERPL-SCNC: 3.9 MMOL/L (ref 3.5–5.1)
RBC # BLD: 3.28 M/UL (ref 4–5.2)
SODIUM BLD-SCNC: 141 MMOL/L (ref 136–145)
TOTAL PROTEIN: 6.2 G/DL (ref 6.4–8.2)
WBC # BLD: 8.8 K/UL (ref 4–11)

## 2021-02-01 NOTE — TELEPHONE ENCOUNTER
Creatinine was done and was normal 1/29/2021  It does not need to be done again Medical Necessity Statement: Based on my medical judgement, Mohs surgery is the most appropriate treatment for this cancer compared to other treatments.

## 2021-02-02 ENCOUNTER — INITIAL CONSULT (OUTPATIENT)
Dept: SURGERY | Age: 66
End: 2021-02-02
Payer: COMMERCIAL

## 2021-02-02 ENCOUNTER — OFFICE VISIT (OUTPATIENT)
Dept: FAMILY MEDICINE CLINIC | Age: 66
End: 2021-02-02
Payer: COMMERCIAL

## 2021-02-02 ENCOUNTER — HOSPITAL ENCOUNTER (OUTPATIENT)
Dept: CT IMAGING | Age: 66
Discharge: HOME | End: 2021-02-02
Payer: COMMERCIAL

## 2021-02-02 VITALS
BODY MASS INDEX: 30.15 KG/M2 | DIASTOLIC BLOOD PRESSURE: 70 MMHG | TEMPERATURE: 96.9 F | WEIGHT: 176.6 LBS | SYSTOLIC BLOOD PRESSURE: 137 MMHG | HEIGHT: 64 IN | HEART RATE: 111 BPM

## 2021-02-02 VITALS
TEMPERATURE: 98.7 F | BODY MASS INDEX: 29.92 KG/M2 | HEART RATE: 112 BPM | DIASTOLIC BLOOD PRESSURE: 68 MMHG | SYSTOLIC BLOOD PRESSURE: 118 MMHG | WEIGHT: 174.4 LBS | OXYGEN SATURATION: 98 %

## 2021-02-02 DIAGNOSIS — C18.2 MALIGNANT NEOPLASM OF ASCENDING COLON (HCC): ICD-10-CM

## 2021-02-02 DIAGNOSIS — K63.89 COLONIC MASS: ICD-10-CM

## 2021-02-02 DIAGNOSIS — Z01.818 PRE-OP EXAMINATION: Primary | ICD-10-CM

## 2021-02-02 DIAGNOSIS — C18.2 MALIGNANT NEOPLASM OF ASCENDING COLON (HCC): Primary | ICD-10-CM

## 2021-02-02 DIAGNOSIS — D50.0 IRON DEFICIENCY ANEMIA DUE TO CHRONIC BLOOD LOSS: ICD-10-CM

## 2021-02-02 PROCEDURE — 93000 ELECTROCARDIOGRAM COMPLETE: CPT | Performed by: NURSE PRACTITIONER

## 2021-02-02 PROCEDURE — 6360000004 HC RX CONTRAST MEDICATION: Performed by: INTERNAL MEDICINE

## 2021-02-02 PROCEDURE — 99244 OFF/OP CNSLTJ NEW/EST MOD 40: CPT | Performed by: SURGERY

## 2021-02-02 PROCEDURE — 71260 CT THORAX DX C+: CPT

## 2021-02-02 PROCEDURE — 99213 OFFICE O/P EST LOW 20 MIN: CPT | Performed by: NURSE PRACTITIONER

## 2021-02-02 RX ORDER — SODIUM CHLORIDE 0.9 % (FLUSH) 0.9 %
10 SYRINGE (ML) INJECTION PRN
Status: CANCELLED | OUTPATIENT
Start: 2021-02-02

## 2021-02-02 RX ORDER — SODIUM CHLORIDE 0.9 % (FLUSH) 0.9 %
10 SYRINGE (ML) INJECTION EVERY 12 HOURS SCHEDULED
Status: CANCELLED | OUTPATIENT
Start: 2021-02-02

## 2021-02-02 RX ADMIN — IOHEXOL 50 ML: 240 INJECTION, SOLUTION INTRATHECAL; INTRAVASCULAR; INTRAVENOUS; ORAL at 08:36

## 2021-02-02 RX ADMIN — IOPAMIDOL 75 ML: 755 INJECTION, SOLUTION INTRAVENOUS at 08:36

## 2021-02-02 ASSESSMENT — PATIENT HEALTH QUESTIONNAIRE - PHQ9
SUM OF ALL RESPONSES TO PHQ QUESTIONS 1-9: 0
SUM OF ALL RESPONSES TO PHQ9 QUESTIONS 1 & 2: 0

## 2021-02-02 NOTE — PROGRESS NOTES
Preoperative Evaluation    Subjective:   Marcela Gandhi is a 60 y.o. y.o.  female who presents to the office today for a preoperative consultation. Surgeon: Dr. Norman Baker   Location: [facility]  Performing:  Removal of malignant neoplasm of ascending colon  Date: . Planned anesthesia is General.   The patient has the following known anesthesia issues: none   Patient has a bleeding risk of : no recent abnormal bleeding   Patient does not have objection to receiving blood products if needed. Denies any steroid use in the past 6 mo    Review of Systems   Pertinent items are noted in HPI.      Denies fevers, chills, diaphoresis, CP, SOB, dysphagia, abd pain, urinary complaints, new edema, rashes, cyanosis    Past Medical History:   Diagnosis Date    Anemia     History of scarlet fever        Past Surgical History:   Procedure Laterality Date     SECTION      COLONOSCOPY N/A 2021    COLONOSCOPY WITH BIOPSY performed by Lara Wells MD at [place] [address]  2021    COLONOSCOPY SUBMUCOSAL TATTOO INJECTION performed by Lara Wells MD at [address] [facility] UPPER GASTROINTESTINAL ENDOSCOPY N/A 2021    EGD BIOPSY performed by Lara Wells MD at [address] [place] [address] History     Tobacco History     Smoking Status  Former Smoker Smoking Start Date  1972 Quit date  2012 Smoking Frequency  2 packs/day for 30 years (61 pk yrs)    Smokeless Tobacco Use  Never Used          Alcohol History     Alcohol Use Status  Yes Drinks/Week  1 Cans of beer per week Amount  1.0 standard drinks of alcohol/wk          Drug Use     Drug Use Status  Never          Sexual Activity     Sexually Active  Not Currently                Family History   Problem Relation Age of Onset    High Blood Pressure Mother     Diabetes Father     Cancer Brother        Current Outpatient Medications Medication Sig Dispense Refill    ferrous sulfate (IRON 325) 325 (65 Fe) MG tablet Take 1 tablet by mouth daily (with breakfast) 90 tablet 1     No current facility-administered medications for this visit. Objective:   Vitals:    02/02/21 1636   BP: 118/68   Pulse: 112   Temp: 98.7 °F (37.1 °C)   SpO2: 98%       Physical Exam   /68 (Site: Right Upper Arm, Position: Sitting, Cuff Size: Large Adult)   Pulse 112   Temp 98.7 °F (37.1 °C) (Temporal)   Wt 174 lb 6.4 oz (79.1 kg)   LMP  (LMP Unknown)   SpO2 98%   BMI 29.92 kg/m²   General appearance: alert, appears stated age, and cooperative  Throat: lips, mucosa, and tongue normal; teeth and gums normal  Lungs: clear to auscultation bilaterally  Heart: regular rate and rhythm, S1, S2 normal, no murmur, click, rub or gallop  Abdomen: soft, non-tender; bowel sounds normal; no masses,  no organomegaly  Extremities: extremities normal, atraumatic, no cyanosis or edema  Pulses: 2+ and symmetric  Skin: Skin color, texture, turgor normal. No rashes or lesions  Neurologic: Grossly normal     Predictors of intubation difficulty:   Morbid obesity? no   Anatomically abnormal facies? no   Short, thick neck? no   Neck range of motion: normal   Mallampati score: I (soft palate, uvula, fauces, tonsillar pillars visible)   Dentition: No chipped, loose teeth. Missing teeth  - partial lower dentures.      Cardiographics   ECG: Sinus tachycardia       Lab Review   Orders Only on 01/29/2021   Component Date Value    WBC 01/29/2021 8.8     RBC 01/29/2021 3.28*    Hemoglobin 01/29/2021 7.2*    Hematocrit 01/29/2021 24.4*    MCV 01/29/2021 74.4*    MCH 01/29/2021 21.9*    MCHC 01/29/2021 29.5*    RDW 01/29/2021 16.7*    Platelets 66/24/6627 482*    MPV 01/29/2021 7.7     Neutrophils % 01/29/2021 72.8     Lymphocytes % 01/29/2021 18.4     Monocytes % 01/29/2021 6.3     Eosinophils % 01/29/2021 1.9     Basophils % 01/29/2021 0.6  Neutrophils Absolute 01/29/2021 6.4     Lymphocytes Absolute 01/29/2021 1.6     Monocytes Absolute 01/29/2021 0.6     Eosinophils Absolute 01/29/2021 0.2     Basophils Absolute 01/29/2021 0.1    Orders Only on 01/29/2021   Component Date Value    Sodium 01/29/2021 141     Potassium 01/29/2021 3.9     Chloride 01/29/2021 104     CO2 01/29/2021 26     Anion Gap 01/29/2021 11     Glucose 01/29/2021 107*    BUN 01/29/2021 15     CREATININE 01/29/2021 0.6     GFR Non- 01/29/2021 >60     GFR  01/29/2021 >60     Calcium 01/29/2021 9.4     Total Protein 01/29/2021 6.2*    Albumin 01/29/2021 3.5     Albumin/Globulin Ratio 01/29/2021 1.3     Total Bilirubin 01/29/2021 <0.2     Alkaline Phosphatase 01/29/2021 73     ALT 01/29/2021 8*    AST 01/29/2021 12*    Globulin 01/29/2021 2.7    Office Visit on 01/26/2021   Component Date Value    HPV TYPE 16 01/26/2021 Not Detected     HPV TYPE 18 01/26/2021 Not Detected     HPVOH (OTHER TYPES) 01/26/2021 Not Detected     HPV Comment 01/26/2021 See below    Office Visit on 01/21/2021   Component Date Value    SARS-CoV-2 01/21/2021 Not Detected    Orders Only on 01/08/2021   Component Date Value    Vitamin B-12 01/08/2021 245     Folate 01/08/2021 10.55     Iron 01/08/2021 19*    TIBC 01/08/2021 303     Iron Saturation 01/08/2021 6*    Ferritin 01/08/2021 17.4    Office Visit on 12/17/2020   Component Date Value    WBC 12/17/2020 7.4     RBC 12/17/2020 3.40*    Hemoglobin 12/17/2020 8.1*    Hematocrit 12/17/2020 26.0*    MCV 12/17/2020 76.3*    MCH 12/17/2020 23.9*    MCHC 12/17/2020 31.4     RDW 12/17/2020 15.1     Platelets 26/99/3525 544*    MPV 12/17/2020 7.1     Neutrophils % 12/17/2020 76.9     Lymphocytes % 12/17/2020 14.2     Monocytes % 12/17/2020 7.2     Eosinophils % 12/17/2020 1.2     Basophils % 12/17/2020 0.5     Neutrophils Absolute 12/17/2020 5.7     Lymphocytes Absolute 12/17/2020 1.0  Monocytes Absolute 12/17/2020 0.5     Eosinophils Absolute 12/17/2020 0.1     Basophils Absolute 12/17/2020 0.0     Sodium 12/17/2020 139     Potassium reflex Magnesi* 12/17/2020 4.6     Chloride 12/17/2020 102     CO2 12/17/2020 27     Anion Gap 12/17/2020 10     Glucose 12/17/2020 109*    BUN 12/17/2020 9     CREATININE 12/17/2020 0.6     GFR Non- 12/17/2020 >60     GFR  12/17/2020 >60     Calcium 12/17/2020 8.8     Total Protein 12/17/2020 6.2*    Albumin 12/17/2020 3.4     Albumin/Globulin Ratio 12/17/2020 1.2     Total Bilirubin 12/17/2020 0.3     Alkaline Phosphatase 12/17/2020 70     ALT 12/17/2020 9*    AST 12/17/2020 15     Globulin 12/17/2020 2.8     Hemoglobin A1C 12/17/2020 6.8     eAG 12/17/2020 148. 5     T4 Free 12/17/2020 1.3     TSH 12/17/2020 1.51     HIV Ag/Ab 12/17/2020 Non-Reactive     HIV-1 Antibody 12/17/2020 Non-Reactive     HIV ANTIGEN 12/17/2020 Non-Reactive     HIV-2 Ab 12/17/2020 Non-Reactive     Hep C Ab Interp 12/17/2020 Non-reactive         Assessment:   60 y.o. female with planned surgery as above. - Known risk factors for perioperative complications: Anemia   - Difficulty with intubation is not anticipated. - Current medications which may produce withdrawal symptoms if withheld perioperatively: none    Plan:   1. Preoperative workup as follows ECG. Labs done 1/29/2021.  2. Change in medication regimen before surgery: none, continue med regimen including morning of surgery, w/sip of water   Pt instructed to avoid NSAIDs, ASA, MV, Vitamin E, Fish oil 1 week prior to surgery to decrease bleeding risk. 3. Prophylaxis for cardiac events with perioperative beta-blockers: not indicated   4. Invasive hemodynamic monitoring perioperatively: not indicated   5. Deep vein thrombosis prophylaxis postoperatively:regimen to be chosen by surgical team   6. Other measures: none      1.  Pre-op examination    - EKG 12 lead 2. Malignant neoplasm of ascending colon [facility])        While assessing care for this patient, I have reviewed all pertinent lab work/imaging/ specialist notes and care in reference to those problems addressed above in detail. Appropriate medical decision making was based on this. Pt is at an acceptable risk for planned procedure. CBC sent to Dr. Duane Muscat, H&H went from 8.1 and 26 (12/17/20) to 7.2 and 24.4 (1/29/2021).     Please call with any questions    Eugenieer , CNP

## 2021-02-03 ENCOUNTER — TELEPHONE (OUTPATIENT)
Dept: SURGERY | Age: 66
End: 2021-02-03

## 2021-02-03 ENCOUNTER — ANESTHESIA EVENT (OUTPATIENT)
Dept: OPERATING ROOM | Age: 66
DRG: 331 | End: 2021-02-03
Payer: COMMERCIAL

## 2021-02-03 NOTE — TELEPHONE ENCOUNTER
Pt saw Dr Pete Arteaga in the office and was given surgery instructions for an Inpatient Open Right Colectomy (General Anes) scheduled this Thursday 2/4/21 @ Nellie 23 arrival 12PM [facility] - NPO after midnight - Argenis Trevino CNP to complete pre-op H&P - Pt understood and agreed to above noted

## 2021-02-03 NOTE — PATIENT INSTRUCTIONS
[address] [address]  Phone: [phone]  Fax: [phone] will be scheduled for surgery with Dr. Lila Driver. · The office will call you with the date and time that surgery is scheduled. · Please take note of these instructions for surgery:  · You should have nothing by mouth after midnight the night before your surgery - this includes no food or water. · Your surgery will be cancelled if you have taken anything by mouth after midnight, NO exceptions. · You will need to have a history and physical prior to your surgery. This will need to be completed up to 30 days before your surgery. This H/P can be completed by your family doctor or the hospital.   · IF you take coumadin (warfarin), please stop taking this medication 5 days prior to your surgery. · IF you take plavix, please stop taking this 7 days prior to your surgery. · Please contact our office if you have a pacemaker or defibrillator. · IF you are allergic to latex, please tell our office prior to your surgery. This is important in know before scheduling your surgery. · IF you are having an out patient surgery, you will need someone available to drive you home after your surgery, and to also stay with you for the rest of the day. · IF you are having a surgery requiring an inpatient stay in the hospital, you will need someone to drive you home upon discharge from the hospital.  · Please contact Dr. Marcel Segundo assistant Sylvia Cool if you have any questions or concerns. · Please call the office with any changes in your symptoms or further questions/concerns.  [phone]

## 2021-02-04 ENCOUNTER — HOSPITAL ENCOUNTER (INPATIENT)
Age: 66
LOS: 4 days | Discharge: HOME HEALTH | DRG: 331 | End: 2021-02-08
Attending: SURGERY | Admitting: SURGERY
Payer: COMMERCIAL

## 2021-02-04 ENCOUNTER — ANESTHESIA (OUTPATIENT)
Dept: OPERATING ROOM | Age: 66
DRG: 331 | End: 2021-02-04
Payer: COMMERCIAL

## 2021-02-04 VITALS
SYSTOLIC BLOOD PRESSURE: 137 MMHG | TEMPERATURE: 97 F | DIASTOLIC BLOOD PRESSURE: 65 MMHG | OXYGEN SATURATION: 100 % | RESPIRATION RATE: 2 BRPM

## 2021-02-04 DIAGNOSIS — K63.89 COLONIC MASS: ICD-10-CM

## 2021-02-04 PROBLEM — Z90.49 S/P PARTIAL COLECTOMY: Status: ACTIVE | Noted: 2021-02-04

## 2021-02-04 LAB
ABO/RH: NORMAL
ANION GAP SERPL CALCULATED.3IONS-SCNC: 10 MMOL/L (ref 3–16)
ANTIBODY SCREEN: NORMAL
BUN BLDV-MCNC: 12 MG/DL (ref 7–20)
CALCIUM SERPL-MCNC: 9.1 MG/DL (ref 8.3–10.6)
CHLORIDE BLD-SCNC: 105 MMOL/L (ref 99–110)
CO2: 25 MMOL/L (ref 21–32)
CREAT SERPL-MCNC: 0.6 MG/DL (ref 0.6–1.2)
GFR AFRICAN AMERICAN: >60
GFR NON-AFRICAN AMERICAN: >60
GLUCOSE BLD-MCNC: 109 MG/DL (ref 70–99)
GLUCOSE BLD-MCNC: 110 MG/DL (ref 70–99)
GLUCOSE BLD-MCNC: 133 MG/DL (ref 70–99)
GLUCOSE BLD-MCNC: 151 MG/DL (ref 70–99)
GLUCOSE BLD-MCNC: 152 MG/DL (ref 70–99)
GLUCOSE BLD-MCNC: 182 MG/DL (ref 70–99)
GLUCOSE BLD-MCNC: 233 MG/DL (ref 70–99)
HCT VFR BLD CALC: 22.2 % (ref 36–48)
HCT VFR BLD CALC: 23.5 % (ref 36–48)
HEMOGLOBIN: 6.7 G/DL (ref 12–16)
HEMOGLOBIN: 7.3 G/DL (ref 12–16)
MCH RBC QN AUTO: 22.9 PG (ref 26–34)
MCHC RBC AUTO-ENTMCNC: 31 G/DL (ref 31–36)
MCV RBC AUTO: 73.8 FL (ref 80–100)
PDW BLD-RTO: 17.7 % (ref 12.4–15.4)
PERFORMED ON: ABNORMAL
PLATELET # BLD: 499 K/UL (ref 135–450)
PMV BLD AUTO: 7.1 FL (ref 5–10.5)
POTASSIUM REFLEX MAGNESIUM: 4.5 MMOL/L (ref 3.5–5.1)
RBC # BLD: 3.18 M/UL (ref 4–5.2)
SARS-COV-2, NAAT: NOT DETECTED
SODIUM BLD-SCNC: 140 MMOL/L (ref 136–145)
WBC # BLD: 9.5 K/UL (ref 4–11)

## 2021-02-04 PROCEDURE — 3600000004 HC SURGERY LEVEL 4 BASE: Performed by: SURGERY

## 2021-02-04 PROCEDURE — 88309 TISSUE EXAM BY PATHOLOGIST: CPT

## 2021-02-04 PROCEDURE — 85027 COMPLETE CBC AUTOMATED: CPT

## 2021-02-04 PROCEDURE — 85018 HEMOGLOBIN: CPT

## 2021-02-04 PROCEDURE — 6360000002 HC RX W HCPCS: Performed by: ANESTHESIOLOGY

## 2021-02-04 PROCEDURE — 36592 COLLECT BLOOD FROM PICC: CPT

## 2021-02-04 PROCEDURE — 44160 REMOVAL OF COLON: CPT | Performed by: SURGERY

## 2021-02-04 PROCEDURE — 86900 BLOOD TYPING SEROLOGIC ABO: CPT

## 2021-02-04 PROCEDURE — 3700000001 HC ADD 15 MINUTES (ANESTHESIA): Performed by: SURGERY

## 2021-02-04 PROCEDURE — 2580000003 HC RX 258: Performed by: ANESTHESIOLOGY

## 2021-02-04 PROCEDURE — 6360000002 HC RX W HCPCS: Performed by: SURGERY

## 2021-02-04 PROCEDURE — 2709999900 HC NON-CHARGEABLE SUPPLY: Performed by: SURGERY

## 2021-02-04 PROCEDURE — 86850 RBC ANTIBODY SCREEN: CPT

## 2021-02-04 PROCEDURE — 3700000000 HC ANESTHESIA ATTENDED CARE: Performed by: SURGERY

## 2021-02-04 PROCEDURE — 86901 BLOOD TYPING SEROLOGIC RH(D): CPT

## 2021-02-04 PROCEDURE — 80048 BASIC METABOLIC PNL TOTAL CA: CPT

## 2021-02-04 PROCEDURE — 7100000000 HC PACU RECOVERY - FIRST 15 MIN: Performed by: SURGERY

## 2021-02-04 PROCEDURE — 94761 N-INVAS EAR/PLS OXIMETRY MLT: CPT

## 2021-02-04 PROCEDURE — 2580000003 HC RX 258: Performed by: SURGERY

## 2021-02-04 PROCEDURE — 2720000010 HC SURG SUPPLY STERILE: Performed by: SURGERY

## 2021-02-04 PROCEDURE — 6370000000 HC RX 637 (ALT 250 FOR IP): Performed by: SURGERY

## 2021-02-04 PROCEDURE — U0002 COVID-19 LAB TEST NON-CDC: HCPCS

## 2021-02-04 PROCEDURE — 7100000001 HC PACU RECOVERY - ADDTL 15 MIN: Performed by: SURGERY

## 2021-02-04 PROCEDURE — 2700000000 HC OXYGEN THERAPY PER DAY

## 2021-02-04 PROCEDURE — 2500000003 HC RX 250 WO HCPCS: Performed by: NURSE ANESTHETIST, CERTIFIED REGISTERED

## 2021-02-04 PROCEDURE — 85014 HEMATOCRIT: CPT

## 2021-02-04 PROCEDURE — 2500000003 HC RX 250 WO HCPCS: Performed by: ANESTHESIOLOGY

## 2021-02-04 PROCEDURE — 88342 IMHCHEM/IMCYTCHM 1ST ANTB: CPT

## 2021-02-04 PROCEDURE — 2500000003 HC RX 250 WO HCPCS: Performed by: SURGERY

## 2021-02-04 PROCEDURE — 3600000014 HC SURGERY LEVEL 4 ADDTL 15MIN: Performed by: SURGERY

## 2021-02-04 PROCEDURE — 6360000002 HC RX W HCPCS: Performed by: NURSE ANESTHETIST, CERTIFIED REGISTERED

## 2021-02-04 PROCEDURE — 1200000000 HC SEMI PRIVATE

## 2021-02-04 PROCEDURE — 88341 IMHCHEM/IMCYTCHM EA ADD ANTB: CPT

## 2021-02-04 PROCEDURE — 0DTF0ZZ RESECTION OF RIGHT LARGE INTESTINE, OPEN APPROACH: ICD-10-PCS | Performed by: SURGERY

## 2021-02-04 RX ORDER — MORPHINE SULFATE 2 MG/ML
1 INJECTION, SOLUTION INTRAMUSCULAR; INTRAVENOUS EVERY 5 MIN PRN
Status: DISCONTINUED | OUTPATIENT
Start: 2021-02-04 | End: 2021-02-04 | Stop reason: HOSPADM

## 2021-02-04 RX ORDER — OXYCODONE HYDROCHLORIDE 5 MG/1
5 TABLET ORAL EVERY 4 HOURS PRN
Status: DISCONTINUED | OUTPATIENT
Start: 2021-02-04 | End: 2021-02-08 | Stop reason: HOSPADM

## 2021-02-04 RX ORDER — OXYCODONE HYDROCHLORIDE AND ACETAMINOPHEN 5; 325 MG/1; MG/1
2 TABLET ORAL PRN
Status: DISCONTINUED | OUTPATIENT
Start: 2021-02-04 | End: 2021-02-04 | Stop reason: HOSPADM

## 2021-02-04 RX ORDER — SODIUM CHLORIDE 0.9 % (FLUSH) 0.9 %
10 SYRINGE (ML) INJECTION PRN
Status: DISCONTINUED | OUTPATIENT
Start: 2021-02-04 | End: 2021-02-04 | Stop reason: HOSPADM

## 2021-02-04 RX ORDER — SODIUM CHLORIDE 0.9 % (FLUSH) 0.9 %
10 SYRINGE (ML) INJECTION EVERY 12 HOURS SCHEDULED
Status: DISCONTINUED | OUTPATIENT
Start: 2021-02-04 | End: 2021-02-08 | Stop reason: HOSPADM

## 2021-02-04 RX ORDER — APREPITANT 40 MG/1
40 CAPSULE ORAL ONCE
Status: COMPLETED | OUTPATIENT
Start: 2021-02-04 | End: 2021-02-04

## 2021-02-04 RX ORDER — MAGNESIUM SULFATE HEPTAHYDRATE 500 MG/ML
INJECTION, SOLUTION INTRAMUSCULAR; INTRAVENOUS PRN
Status: DISCONTINUED | OUTPATIENT
Start: 2021-02-04 | End: 2021-02-04 | Stop reason: SDUPTHER

## 2021-02-04 RX ORDER — SODIUM CHLORIDE 9 MG/ML
INJECTION, SOLUTION INTRAVENOUS CONTINUOUS
Status: DISCONTINUED | OUTPATIENT
Start: 2021-02-04 | End: 2021-02-07

## 2021-02-04 RX ORDER — PROPOFOL 10 MG/ML
INJECTION, EMULSION INTRAVENOUS PRN
Status: DISCONTINUED | OUTPATIENT
Start: 2021-02-04 | End: 2021-02-04 | Stop reason: SDUPTHER

## 2021-02-04 RX ORDER — KETAMINE HYDROCHLORIDE 100 MG/ML
INJECTION, SOLUTION INTRAMUSCULAR; INTRAVENOUS PRN
Status: DISCONTINUED | OUTPATIENT
Start: 2021-02-04 | End: 2021-02-04 | Stop reason: SDUPTHER

## 2021-02-04 RX ORDER — ACETAMINOPHEN 325 MG/1
650 TABLET ORAL EVERY 4 HOURS PRN
Status: DISCONTINUED | OUTPATIENT
Start: 2021-02-04 | End: 2021-02-08 | Stop reason: HOSPADM

## 2021-02-04 RX ORDER — SODIUM CHLORIDE 0.9 % (FLUSH) 0.9 %
10 SYRINGE (ML) INJECTION PRN
Status: DISCONTINUED | OUTPATIENT
Start: 2021-02-04 | End: 2021-02-08 | Stop reason: HOSPADM

## 2021-02-04 RX ORDER — LIDOCAINE HYDROCHLORIDE ANHYDROUS AND DEXTROSE MONOHYDRATE .4; 5 G/100ML; G/100ML
INJECTION, SOLUTION INTRAVENOUS CONTINUOUS PRN
Status: DISCONTINUED | OUTPATIENT
Start: 2021-02-04 | End: 2021-02-04 | Stop reason: SDUPTHER

## 2021-02-04 RX ORDER — FENTANYL CITRATE 50 UG/ML
INJECTION, SOLUTION INTRAMUSCULAR; INTRAVENOUS PRN
Status: DISCONTINUED | OUTPATIENT
Start: 2021-02-04 | End: 2021-02-04 | Stop reason: SDUPTHER

## 2021-02-04 RX ORDER — PROMETHAZINE HYDROCHLORIDE 25 MG/1
12.5 TABLET ORAL EVERY 6 HOURS PRN
Status: DISCONTINUED | OUTPATIENT
Start: 2021-02-04 | End: 2021-02-08 | Stop reason: HOSPADM

## 2021-02-04 RX ORDER — EPHEDRINE SULFATE 50 MG/ML
INJECTION INTRAVENOUS PRN
Status: DISCONTINUED | OUTPATIENT
Start: 2021-02-04 | End: 2021-02-04 | Stop reason: SDUPTHER

## 2021-02-04 RX ORDER — LIDOCAINE HYDROCHLORIDE 20 MG/ML
INJECTION, SOLUTION INFILTRATION; PERINEURAL PRN
Status: DISCONTINUED | OUTPATIENT
Start: 2021-02-04 | End: 2021-02-04 | Stop reason: SDUPTHER

## 2021-02-04 RX ORDER — FAMOTIDINE 20 MG/1
20 TABLET, FILM COATED ORAL 2 TIMES DAILY
Status: DISCONTINUED | OUTPATIENT
Start: 2021-02-04 | End: 2021-02-08 | Stop reason: HOSPADM

## 2021-02-04 RX ORDER — OXYCODONE HYDROCHLORIDE 5 MG/1
10 TABLET ORAL EVERY 4 HOURS PRN
Status: DISCONTINUED | OUTPATIENT
Start: 2021-02-04 | End: 2021-02-08 | Stop reason: HOSPADM

## 2021-02-04 RX ORDER — MORPHINE SULFATE 2 MG/ML
2 INJECTION, SOLUTION INTRAMUSCULAR; INTRAVENOUS EVERY 5 MIN PRN
Status: DISCONTINUED | OUTPATIENT
Start: 2021-02-04 | End: 2021-02-04 | Stop reason: HOSPADM

## 2021-02-04 RX ORDER — ONDANSETRON 2 MG/ML
INJECTION INTRAMUSCULAR; INTRAVENOUS PRN
Status: DISCONTINUED | OUTPATIENT
Start: 2021-02-04 | End: 2021-02-04 | Stop reason: SDUPTHER

## 2021-02-04 RX ORDER — MORPHINE SULFATE 2 MG/ML
2 INJECTION, SOLUTION INTRAMUSCULAR; INTRAVENOUS
Status: DISCONTINUED | OUTPATIENT
Start: 2021-02-04 | End: 2021-02-08 | Stop reason: HOSPADM

## 2021-02-04 RX ORDER — GLYCOPYRROLATE 0.2 MG/ML
INJECTION INTRAMUSCULAR; INTRAVENOUS PRN
Status: DISCONTINUED | OUTPATIENT
Start: 2021-02-04 | End: 2021-02-04 | Stop reason: SDUPTHER

## 2021-02-04 RX ORDER — SODIUM CHLORIDE, SODIUM LACTATE, POTASSIUM CHLORIDE, CALCIUM CHLORIDE 600; 310; 30; 20 MG/100ML; MG/100ML; MG/100ML; MG/100ML
INJECTION, SOLUTION INTRAVENOUS CONTINUOUS
Status: DISCONTINUED | OUTPATIENT
Start: 2021-02-04 | End: 2021-02-04

## 2021-02-04 RX ORDER — MORPHINE SULFATE 4 MG/ML
4 INJECTION, SOLUTION INTRAMUSCULAR; INTRAVENOUS
Status: DISCONTINUED | OUTPATIENT
Start: 2021-02-04 | End: 2021-02-08 | Stop reason: HOSPADM

## 2021-02-04 RX ORDER — OXYCODONE HYDROCHLORIDE AND ACETAMINOPHEN 5; 325 MG/1; MG/1
1 TABLET ORAL PRN
Status: DISCONTINUED | OUTPATIENT
Start: 2021-02-04 | End: 2021-02-04 | Stop reason: HOSPADM

## 2021-02-04 RX ORDER — MEPERIDINE HYDROCHLORIDE 50 MG/ML
12.5 INJECTION INTRAMUSCULAR; INTRAVENOUS; SUBCUTANEOUS EVERY 5 MIN PRN
Status: DISCONTINUED | OUTPATIENT
Start: 2021-02-04 | End: 2021-02-04 | Stop reason: HOSPADM

## 2021-02-04 RX ORDER — ONDANSETRON 2 MG/ML
4 INJECTION INTRAMUSCULAR; INTRAVENOUS EVERY 6 HOURS PRN
Status: DISCONTINUED | OUTPATIENT
Start: 2021-02-04 | End: 2021-02-08 | Stop reason: HOSPADM

## 2021-02-04 RX ORDER — ROCURONIUM BROMIDE 10 MG/ML
INJECTION, SOLUTION INTRAVENOUS PRN
Status: DISCONTINUED | OUTPATIENT
Start: 2021-02-04 | End: 2021-02-04 | Stop reason: SDUPTHER

## 2021-02-04 RX ORDER — DEXAMETHASONE SODIUM PHOSPHATE 4 MG/ML
INJECTION, SOLUTION INTRA-ARTICULAR; INTRALESIONAL; INTRAMUSCULAR; INTRAVENOUS; SOFT TISSUE PRN
Status: DISCONTINUED | OUTPATIENT
Start: 2021-02-04 | End: 2021-02-04 | Stop reason: SDUPTHER

## 2021-02-04 RX ORDER — MIDAZOLAM HYDROCHLORIDE 1 MG/ML
INJECTION INTRAMUSCULAR; INTRAVENOUS PRN
Status: DISCONTINUED | OUTPATIENT
Start: 2021-02-04 | End: 2021-02-04 | Stop reason: SDUPTHER

## 2021-02-04 RX ORDER — ONDANSETRON 2 MG/ML
4 INJECTION INTRAMUSCULAR; INTRAVENOUS
Status: DISCONTINUED | OUTPATIENT
Start: 2021-02-04 | End: 2021-02-04 | Stop reason: HOSPADM

## 2021-02-04 RX ORDER — SODIUM CHLORIDE 0.9 % (FLUSH) 0.9 %
10 SYRINGE (ML) INJECTION EVERY 12 HOURS SCHEDULED
Status: DISCONTINUED | OUTPATIENT
Start: 2021-02-04 | End: 2021-02-04 | Stop reason: HOSPADM

## 2021-02-04 RX ADMIN — SODIUM CHLORIDE: 9 INJECTION, SOLUTION INTRAVENOUS at 18:59

## 2021-02-04 RX ADMIN — PROPOFOL 150 MG: 10 INJECTION, EMULSION INTRAVENOUS at 15:40

## 2021-02-04 RX ADMIN — SUGAMMADEX 158 MG: 100 INJECTION, SOLUTION INTRAVENOUS at 17:10

## 2021-02-04 RX ADMIN — FAMOTIDINE 20 MG: 10 INJECTION, SOLUTION INTRAVENOUS at 13:17

## 2021-02-04 RX ADMIN — HYDROMORPHONE HYDROCHLORIDE 0.5 MG: 1 INJECTION, SOLUTION INTRAMUSCULAR; INTRAVENOUS; SUBCUTANEOUS at 17:35

## 2021-02-04 RX ADMIN — SODIUM CHLORIDE, POTASSIUM CHLORIDE, SODIUM LACTATE AND CALCIUM CHLORIDE: 600; 310; 30; 20 INJECTION, SOLUTION INTRAVENOUS at 13:17

## 2021-02-04 RX ADMIN — GLYCOPYRROLATE 0.3 MG: 0.2 INJECTION, SOLUTION INTRAMUSCULAR; INTRAVENOUS at 16:09

## 2021-02-04 RX ADMIN — FAMOTIDINE 20 MG: 20 TABLET, FILM COATED ORAL at 20:15

## 2021-02-04 RX ADMIN — OXYCODONE 10 MG: 5 TABLET ORAL at 23:49

## 2021-02-04 RX ADMIN — APREPITANT 40 MG: 40 CAPSULE ORAL at 13:17

## 2021-02-04 RX ADMIN — INSULIN LISPRO 2 UNITS: 100 INJECTION, SOLUTION INTRAVENOUS; SUBCUTANEOUS at 23:49

## 2021-02-04 RX ADMIN — EPHEDRINE SULFATE 5 MG: 50 INJECTION INTRAVENOUS at 15:56

## 2021-02-04 RX ADMIN — ROCURONIUM BROMIDE 50 MG: 10 SOLUTION INTRAVENOUS at 15:40

## 2021-02-04 RX ADMIN — LIDOCAINE HYDROCHLORIDE 80 MG: 20 INJECTION, SOLUTION INFILTRATION; PERINEURAL at 15:40

## 2021-02-04 RX ADMIN — METRONIDAZOLE 500 MG: 500 INJECTION, SOLUTION INTRAVENOUS at 20:15

## 2021-02-04 RX ADMIN — MIDAZOLAM HYDROCHLORIDE 2 MG: 2 INJECTION, SOLUTION INTRAMUSCULAR; INTRAVENOUS at 15:40

## 2021-02-04 RX ADMIN — MAGNESIUM SULFATE HEPTAHYDRATE 2 G: 500 INJECTION, SOLUTION INTRAMUSCULAR; INTRAVENOUS at 15:40

## 2021-02-04 RX ADMIN — DEXAMETHASONE SODIUM PHOSPHATE 10 MG: 4 INJECTION, SOLUTION INTRAMUSCULAR; INTRAVENOUS at 15:40

## 2021-02-04 RX ADMIN — EPHEDRINE SULFATE 10 MG: 50 INJECTION INTRAVENOUS at 16:10

## 2021-02-04 RX ADMIN — ONDANSETRON 4 MG: 2 INJECTION INTRAMUSCULAR; INTRAVENOUS at 15:40

## 2021-02-04 RX ADMIN — HYDROMORPHONE HYDROCHLORIDE 0.2 MG: 1 INJECTION, SOLUTION INTRAMUSCULAR; INTRAVENOUS; SUBCUTANEOUS at 17:04

## 2021-02-04 RX ADMIN — FENTANYL CITRATE 50 MCG: 50 INJECTION INTRAMUSCULAR; INTRAVENOUS at 15:40

## 2021-02-04 RX ADMIN — HYDROMORPHONE HYDROCHLORIDE 0.2 MG: 1 INJECTION, SOLUTION INTRAMUSCULAR; INTRAVENOUS; SUBCUTANEOUS at 16:34

## 2021-02-04 RX ADMIN — CEFAZOLIN SODIUM 2000 MG: 10 INJECTION, POWDER, FOR SOLUTION INTRAVENOUS at 23:49

## 2021-02-04 RX ADMIN — KETAMINE HYDROCHLORIDE 40 MG: 100 INJECTION, SOLUTION INTRAMUSCULAR; INTRAVENOUS at 15:59

## 2021-02-04 RX ADMIN — CEFAZOLIN SODIUM 2 G: 10 INJECTION, POWDER, FOR SOLUTION INTRAVENOUS at 15:40

## 2021-02-04 RX ADMIN — FENTANYL CITRATE 50 MCG: 50 INJECTION INTRAMUSCULAR; INTRAVENOUS at 16:17

## 2021-02-04 RX ADMIN — SODIUM CHLORIDE, POTASSIUM CHLORIDE, SODIUM LACTATE AND CALCIUM CHLORIDE: 600; 310; 30; 20 INJECTION, SOLUTION INTRAVENOUS at 15:40

## 2021-02-04 RX ADMIN — LIDOCAINE HYDROCHLORIDE 2 MG/MIN: 4 INJECTION, SOLUTION INTRAVENOUS at 15:40

## 2021-02-04 ASSESSMENT — PAIN SCALES - GENERAL
PAINLEVEL_OUTOF10: 7
PAINLEVEL_OUTOF10: 6

## 2021-02-04 ASSESSMENT — PULMONARY FUNCTION TESTS
PIF_VALUE: 1
PIF_VALUE: 17
PIF_VALUE: 18
PIF_VALUE: 15
PIF_VALUE: 16
PIF_VALUE: 18
PIF_VALUE: 18
PIF_VALUE: 17
PIF_VALUE: 17
PIF_VALUE: 16
PIF_VALUE: 17
PIF_VALUE: 17
PIF_VALUE: 18
PIF_VALUE: 21
PIF_VALUE: 17
PIF_VALUE: 16
PIF_VALUE: 16
PIF_VALUE: 14
PIF_VALUE: 18
PIF_VALUE: 19
PIF_VALUE: 16
PIF_VALUE: 18
PIF_VALUE: 17
PIF_VALUE: 17
PIF_VALUE: 18
PIF_VALUE: 17
PIF_VALUE: 16
PIF_VALUE: 17
PIF_VALUE: 18
PIF_VALUE: 3
PIF_VALUE: 17
PIF_VALUE: 16
PIF_VALUE: 16
PIF_VALUE: 32
PIF_VALUE: 17
PIF_VALUE: 17
PIF_VALUE: 14
PIF_VALUE: 17
PIF_VALUE: 3
PIF_VALUE: 18
PIF_VALUE: 16
PIF_VALUE: 19
PIF_VALUE: 17
PIF_VALUE: 16
PIF_VALUE: 18
PIF_VALUE: 21
PIF_VALUE: 16
PIF_VALUE: 17
PIF_VALUE: 16
PIF_VALUE: 17
PIF_VALUE: 17
PIF_VALUE: 18
PIF_VALUE: 1
PIF_VALUE: 0
PIF_VALUE: 17

## 2021-02-04 ASSESSMENT — PAIN DESCRIPTION - LOCATION: LOCATION: ABDOMEN

## 2021-02-04 ASSESSMENT — LIFESTYLE VARIABLES: SMOKING_STATUS: 0

## 2021-02-04 ASSESSMENT — ENCOUNTER SYMPTOMS: SHORTNESS OF BREATH: 0

## 2021-02-04 ASSESSMENT — PAIN DESCRIPTION - DESCRIPTORS: DESCRIPTORS: OTHER (COMMENT)

## 2021-02-04 ASSESSMENT — PAIN - FUNCTIONAL ASSESSMENT: PAIN_FUNCTIONAL_ASSESSMENT: 0-10

## 2021-02-04 NOTE — H&P
I have reviewed the history and physical and examined the patient. I find no relevant changes. I have reviewed with the patient and/or family members, during the preoperative office visit the risks, benefits, and alternatives to the procedure.     [facility]

## 2021-02-04 NOTE — PROGRESS NOTES
Patient arrived in PACU at this time and placed on monitor in stable condition. [facility] Will continue to monitor.

## 2021-02-04 NOTE — ANESTHESIA POSTPROCEDURE EVALUATION
Department of Anesthesiology  Postprocedure Note    Patient: Gustavo Villalobos  MRN: [id number]  YOB: 1955  Date of evaluation: 2/4/2021  Time:  6:11 PM     Procedure Summary     Date: 02/04/21 Room / Location: [address]    Anesthesia Start: 4327 Anesthesia Stop: 9506    Procedure: OPEN RIGHT COLECTOMY (Right Abdomen) Diagnosis:       Colonic mass      (COLONIC MASS)    Surgeons: Kanchan Rodgers MD Responsible Provider: Ivana Saunders MD    Anesthesia Type: general ASA Status: 3          Anesthesia Type: general    Katia Phase I: Katia Score: 9    Katia Phase II:      Last vitals: Reviewed and per EMR flowsheets.      Vitals:    02/04/21 1735 02/04/21 1740 02/04/21 1745 02/04/21 1750   BP:  (!) 122/52 (!) 121/54 (!) 125/51   Pulse: 92 84 84 80   Resp: 15 20 14 13   Temp:       TempSrc:       SpO2: 97% 96% 96% 98%   Weight:       Height:         Anesthesia Post Evaluation    Patient location during evaluation: bedside  Patient participation: complete - patient participated  Level of consciousness: awake and alert  Airway patency: patent  Nausea & Vomiting: no nausea  Complications: no  Cardiovascular status: hemodynamically stable (repeat h/h in pacu: 6.7/22.2 (started 7.3/23.5))  Respiratory status: acceptable  Hydration status: euvolemic

## 2021-02-04 NOTE — PROGRESS NOTES
4 Eyes Skin Assessment     The patient is being assess for   Post-Op Surgical    I agree that 2 RN's have performed a thorough Head to Toe Skin Assessment on the patient. ALL assessment sites listed below have been assessed. Areas assessed by both nurses:   [x]   Head, Face, and Ears   [x]   Shoulders, Back, and Chest, Abdomen  [x]   Arms, Elbows, and Hands   [x]   Coccyx, Sacrum, and Ischium  [x]   Legs, Feet, and Heels        No issues noted*    **SHARE this note so that the co-signing nurse is able to place an eSignature**    Co-signer eSignature: Electronically signed by Pelon Persaud RN on 2/4/21 at 6:33 PM EST    Does the Patient have Skin Breakdown?   No          Angelo Prevention initiated:  NA   Wound Care Orders initiated:  NA      [facility] nurse consulted for Pressure Injury (Stage 3,4, Unstageable, DTI, NWPT, Complex wounds)and New or Established Ostomies:  NA      Primary Nurse eSignature: Electronically signed by Yazmin Medina RN on 2/4/21 at 6:42 PM EST

## 2021-02-04 NOTE — ANESTHESIA PRE PROCEDURE
Department of Anesthesiology  Preprocedure Note       Name:  Edgar Coronel   Age:  60 y.o.  :  1955                                          MRN:  [id number]         Date:  2021      Surgeon: Jeremie Rai):  Janessa Chavez MD    Procedure: Procedure(s):  OPEN RIGHT COLECTOMY    Medications prior to admission:   Prior to Admission medications    Medication Sig Start Date End Date Taking? Authorizing Provider   ferrous sulfate (IRON 325) 325 (65 Fe) MG tablet Take 1 tablet by mouth daily (with breakfast) 21  Yes PRANAV Camacho CNP       Current medications:    Current Facility-Administered Medications   Medication Dose Route Frequency Provider Last Rate Last Admin    lactated ringers infusion   Intravenous Continuous Hortensia Villalta MD        sodium chloride flush 0.9 % injection 10 mL  10 mL Intravenous 2 times per day Hortensia Villalta MD        sodium chloride flush 0.9 % injection 10 mL  10 mL Intravenous PRN Hortensia Villalta MD        famotidine (PEPCID) injection 20 mg  20 mg Intravenous Once Hortensia Villalta MD        ceFAZolin (ANCEF) 2000 mg in dextrose 5 % 100 mL IVPB  2,000 mg Intravenous On Call to [address] [place] [address], MD        enoxaparin (LOVENOX) injection 40 mg  40 mg Subcutaneous Once Janessa Chavez MD        sodium chloride flush 0.9 % injection 10 mL  10 mL Intravenous 2 times per day Janessa Chavez MD        sodium chloride flush 0.9 % injection 10 mL  10 mL Intravenous PRN Janessa Chavez MD           Allergies:     Allergies   Allergen Reactions    Novocain [Procaine] Rash       Problem List:    Patient Active Problem List   Diagnosis Code    Iron deficiency anemia D50.9    Colon cancer screening Z12.11    Malignant neoplasm of ascending colon ([facility] [address] C18.2    Diverticulosis of colon K57.30       Past Medical History:        Diagnosis Date    Anemia     Cancer ([facility] [address]     colon    History of scarlet fever Past Surgical History:        Procedure Laterality Date     SECTION      COLONOSCOPY N/A 2021    COLONOSCOPY WITH BIOPSY performed by Fidel Hoffmann MD at [address]  2021    COLONOSCOPY SUBMUCOSAL TATTOO INJECTION performed by Fidel Hoffmann MD at [address] [employer] UPPER GASTROINTESTINAL ENDOSCOPY N/A 2021    EGD BIOPSY performed by Fidel Hoffmann MD at [address] [place] [address] History:    Social History     Tobacco Use    Smoking status: Former Smoker     Packs/day: 2.00     Years: 30.00     Pack years: 60.00     Start date: 1972     Quit date: 2012     Years since quittin.1    Smokeless tobacco: Never Used   Substance Use Topics    Alcohol use: Yes     Frequency: Monthly or less     Comment: 4 x per year                                Counseling given: Not Answered      Vital Signs (Current):   Vitals:    21 0929 21 1246   BP:  (!) 145/78   Pulse:  94   Resp:  16   Temp:  99.4 °F (37.4 °C)   TempSrc:  Temporal   SpO2:  98%   Weight: 174 lb (78.9 kg)    Height: 5' 4\" (1.626 m)                                               BP Readings from Last 3 Encounters:   21 (!) 145/78   21 118/68   21 137/70       NPO Status:  mn+, see mar                                                                               BMI:   Wt Readings from Last 3 Encounters:   21 174 lb (78.9 kg)   21 174 lb 6.4 oz (79.1 kg)   21 176 lb 9.6 oz (80.1 kg)     Body mass index is 29.87 kg/m².     CBC:   Lab Results   Component Value Date    WBC 8.8 2021    RBC 3.28 2021    HGB 7.2 2021    HCT 24.4 2021    MCV 74.4 2021    RDW 16.7 2021     2021       CMP:   Lab Results   Component Value Date     2021    K 3.9 2021    K 4.6 2020     2021    CO2 26 2021    BUN 15 2021 CREATININE 0.6 01/29/2021    GFRAA >60 01/29/2021    AGRATIO 1.3 01/29/2021    LABGLOM >60 01/29/2021    GLUCOSE 107 01/29/2021    PROT 6.2 01/29/2021    CALCIUM 9.4 01/29/2021    BILITOT <0.2 01/29/2021    ALKPHOS 73 01/29/2021    AST 12 01/29/2021    ALT 8 01/29/2021       POC Tests: No results for input(s): POCGLU, POCNA, POCK, POCCL, POCBUN, POCHEMO, POCHCT in the last 72 hours. Coags: No results found for: PROTIME, INR, APTT    HCG (If Applicable): No results found for: PREGTESTUR, PREGSERUM, HCG, HCGQUANT     ABGs: No results found for: PHART, PO2ART, SQR4PRW, HIE4TEJ, BEART, Q2OVRNJY     Type & Screen (If Applicable):  No results found for: LABABO, LABRH    Drug/Infectious Status (If Applicable):  No results found for: HIV, HEPCAB    COVID-19 Screening (If Applicable):   Lab Results   Component Value Date    COVID19 Not Detected 01/21/2021         Anesthesia Evaluation  Patient summary reviewed   history of anesthetic complications: PONV. Airway: Mallampati: II  TM distance: >3 FB   Neck ROM: full  Mouth opening: > = 3 FB Dental:    (+) lower dentures and partials      Pulmonary:Negative Pulmonary ROS breath sounds clear to auscultation      (-) COPD, asthma, shortness of breath, recent URI, sleep apnea and not a current smoker          Patient did not smoke on day of surgery. Cardiovascular:Negative CV ROS        (-) pacemaker, hypertension, past MI, CAD, CABG/stent, dysrhythmias,  angina and  CHF    ECG reviewed  Rhythm: regular  Rate: normal           Beta Blocker:  Not on Beta Blocker         Neuro/Psych:   Negative Neuro/Psych ROS     (-) seizures, TIA and CVA           GI/Hepatic/Renal: Neg GI/Hepatic/Renal ROS       (-) GERD, liver disease, no renal disease, bowel prep and no morbid obesity       Endo/Other:    (+) blood dyscrasia: arthritis:., malignancy/cancer (colon).     (-) diabetes mellitus, hypothyroidism, hyperthyroidism               Abdominal: Vascular: negative vascular ROS. Anesthesia Plan      general     ASA 3       Induction: rapid sequence. MIPS: Postoperative opioids intended and Prophylactic antiemetics administered. Anesthetic plan and risks discussed with patient. Use of blood products discussed with patient whom consented to blood products. Plan discussed with CRNA. This pre-anesthesia assessment may be used as a history and physical.    DOS STAFF ADDENDUM:    Pt seen and examined, chart reviewed (including anesthesia, drug and allergy history). No interval changes to history and physical examination. Anesthetic plan, risks, benefits, alternatives, and personnel involved discussed with patient. Patient verbalized an understanding and agrees to proceed.       Ivory Campa MD  February 4, 2021  1:05 PM      Ivory Campa MD   2/4/2021

## 2021-02-04 NOTE — BRIEF OP NOTE
Brief Postoperative Note      Patient: Sina Jose  YOB: 1955  MRN: [id number]    Date of Procedure: 2/4/2021    Pre-Op Diagnosis: COLONIC MASS    Post-Op Diagnosis: R colon cancer       Procedure(s):  OPEN RIGHT COLECTOMY    Surgeon(s):  Carlos Butler MD    Assistant:  Surgical Assistant: Tianna Anaya    Anesthesia: General    Estimated Blood Loss (mL): less than 50     Complications: None    Specimens:   ID Type Source Tests Collected by Time Destination   A : RIGHT COLON WITH MASS Specimen Abdomen SURGICAL PATHOLOGY Carlos Butler MD 2/4/2021 1651        Implants:  * No implants in log *      Drains:   Urethral Catheter Non-latex 16 fr (Active)       Findings: bulky cecal tumor w/o overt, gross signs of metastatic disease             R colectomy w/ stapled ileocolic anastomosis    Job#: [id number]    Electronically signed by Tim Arias MD on 2/4/2021 at 10:03 PM

## 2021-02-04 NOTE — PROGRESS NOTES
Dr. Jonathan Washburn at bedside and notified of Hgb 6.7. Confirmed presence of ordered am labs. No new orders at this time.

## 2021-02-05 LAB
ANION GAP SERPL CALCULATED.3IONS-SCNC: 8 MMOL/L (ref 3–16)
BASOPHILS ABSOLUTE: 0 K/UL (ref 0–0.2)
BASOPHILS RELATIVE PERCENT: 0.1 %
BUN BLDV-MCNC: 9 MG/DL (ref 7–20)
CALCIUM SERPL-MCNC: 8.2 MG/DL (ref 8.3–10.6)
CHLORIDE BLD-SCNC: 103 MMOL/L (ref 99–110)
CO2: 24 MMOL/L (ref 21–32)
CREAT SERPL-MCNC: 0.6 MG/DL (ref 0.6–1.2)
EOSINOPHILS ABSOLUTE: 0 K/UL (ref 0–0.6)
EOSINOPHILS RELATIVE PERCENT: 0 %
GFR AFRICAN AMERICAN: >60
GFR NON-AFRICAN AMERICAN: >60
GLUCOSE BLD-MCNC: 105 MG/DL (ref 70–99)
GLUCOSE BLD-MCNC: 109 MG/DL (ref 70–99)
GLUCOSE BLD-MCNC: 116 MG/DL (ref 70–99)
GLUCOSE BLD-MCNC: 146 MG/DL (ref 70–99)
GLUCOSE BLD-MCNC: 165 MG/DL (ref 70–99)
GLUCOSE BLD-MCNC: 167 MG/DL (ref 70–99)
GLUCOSE BLD-MCNC: 192 MG/DL (ref 70–99)
HCT VFR BLD CALC: 22.4 % (ref 36–48)
HEMOGLOBIN: 6.9 G/DL (ref 12–16)
LYMPHOCYTES ABSOLUTE: 0.6 K/UL (ref 1–5.1)
LYMPHOCYTES RELATIVE PERCENT: 5.5 %
MCH RBC QN AUTO: 22.5 PG (ref 26–34)
MCHC RBC AUTO-ENTMCNC: 30.7 G/DL (ref 31–36)
MCV RBC AUTO: 73.3 FL (ref 80–100)
MONOCYTES ABSOLUTE: 0.3 K/UL (ref 0–1.3)
MONOCYTES RELATIVE PERCENT: 2.6 %
NEUTROPHILS ABSOLUTE: 9.6 K/UL (ref 1.7–7.7)
NEUTROPHILS RELATIVE PERCENT: 91.8 %
PDW BLD-RTO: 17.1 % (ref 12.4–15.4)
PERFORMED ON: ABNORMAL
PLATELET # BLD: 424 K/UL (ref 135–450)
PMV BLD AUTO: 7.4 FL (ref 5–10.5)
POTASSIUM REFLEX MAGNESIUM: 4.5 MMOL/L (ref 3.5–5.1)
RBC # BLD: 3.06 M/UL (ref 4–5.2)
SODIUM BLD-SCNC: 135 MMOL/L (ref 136–145)
WBC # BLD: 10.5 K/UL (ref 4–11)

## 2021-02-05 PROCEDURE — 2580000003 HC RX 258: Performed by: SURGERY

## 2021-02-05 PROCEDURE — 36415 COLL VENOUS BLD VENIPUNCTURE: CPT

## 2021-02-05 PROCEDURE — 6370000000 HC RX 637 (ALT 250 FOR IP): Performed by: SURGERY

## 2021-02-05 PROCEDURE — 2500000003 HC RX 250 WO HCPCS: Performed by: SURGERY

## 2021-02-05 PROCEDURE — 80048 BASIC METABOLIC PNL TOTAL CA: CPT

## 2021-02-05 PROCEDURE — 1200000000 HC SEMI PRIVATE

## 2021-02-05 PROCEDURE — 85025 COMPLETE CBC W/AUTO DIFF WBC: CPT

## 2021-02-05 PROCEDURE — 6360000002 HC RX W HCPCS: Performed by: SURGERY

## 2021-02-05 PROCEDURE — 99024 POSTOP FOLLOW-UP VISIT: CPT | Performed by: SURGERY

## 2021-02-05 RX ORDER — NICOTINE POLACRILEX 4 MG
15 LOZENGE BUCCAL PRN
Status: DISCONTINUED | OUTPATIENT
Start: 2021-02-05 | End: 2021-02-08 | Stop reason: HOSPADM

## 2021-02-05 RX ORDER — DEXTROSE MONOHYDRATE 50 MG/ML
100 INJECTION, SOLUTION INTRAVENOUS PRN
Status: DISCONTINUED | OUTPATIENT
Start: 2021-02-05 | End: 2021-02-08 | Stop reason: HOSPADM

## 2021-02-05 RX ORDER — DEXTROSE MONOHYDRATE 25 G/50ML
12.5 INJECTION, SOLUTION INTRAVENOUS PRN
Status: DISCONTINUED | OUTPATIENT
Start: 2021-02-05 | End: 2021-02-08 | Stop reason: HOSPADM

## 2021-02-05 RX ADMIN — ONDANSETRON 4 MG: 2 INJECTION INTRAMUSCULAR; INTRAVENOUS at 08:27

## 2021-02-05 RX ADMIN — FAMOTIDINE 20 MG: 20 TABLET, FILM COATED ORAL at 08:27

## 2021-02-05 RX ADMIN — FAMOTIDINE 20 MG: 20 TABLET, FILM COATED ORAL at 20:27

## 2021-02-05 RX ADMIN — SODIUM CHLORIDE, PRESERVATIVE FREE 10 ML: 5 INJECTION INTRAVENOUS at 08:28

## 2021-02-05 RX ADMIN — SODIUM CHLORIDE: 9 INJECTION, SOLUTION INTRAVENOUS at 08:28

## 2021-02-05 RX ADMIN — CEFAZOLIN SODIUM 2000 MG: 10 INJECTION, POWDER, FOR SOLUTION INTRAVENOUS at 07:46

## 2021-02-05 RX ADMIN — SODIUM CHLORIDE: 9 INJECTION, SOLUTION INTRAVENOUS at 14:35

## 2021-02-05 RX ADMIN — MORPHINE SULFATE 4 MG: 4 INJECTION, SOLUTION INTRAMUSCULAR; INTRAVENOUS at 01:46

## 2021-02-05 RX ADMIN — INSULIN LISPRO 1 UNITS: 100 INJECTION, SOLUTION INTRAVENOUS; SUBCUTANEOUS at 03:04

## 2021-02-05 RX ADMIN — INSULIN LISPRO 1 UNITS: 100 INJECTION, SOLUTION INTRAVENOUS; SUBCUTANEOUS at 08:28

## 2021-02-05 RX ADMIN — ENOXAPARIN SODIUM 40 MG: 40 INJECTION SUBCUTANEOUS at 08:27

## 2021-02-05 RX ADMIN — METRONIDAZOLE 500 MG: 500 INJECTION, SOLUTION INTRAVENOUS at 03:01

## 2021-02-05 ASSESSMENT — PAIN SCALES - GENERAL: PAINLEVEL_OUTOF10: 7

## 2021-02-05 NOTE — ACP (ADVANCE CARE PLANNING)
Advance Care Planning     General Advance Care Planning (ACP) Conversation    Date of Conversation: 2/3/2021  Conducted with: Patient with Decision Making Capacity    Healthcare Decision Maker:      Primary Decision Maker: Lulu Hills - [phone]    Secondary Decision Maker: Janell Marie Child - [phone]      Today we documented Decision Maker(s) consistent with Legal Next of Kin hierarchy.     Content/Action Overview:  Has NO ACP documents/care preferences - information provided, considering goals and options  Reviewed DNR/DNI and patient elects Full Code (Attempt Resuscitation)  ventilation preferences  Patient wishes for short term use of ventilation, but if clinically indicated improvement is unlikely would not wish for long term use of ventilation     Length of Voluntary ACP Conversation in minutes:  <16 minutes (Non-Billable)    Governor [place]

## 2021-02-05 NOTE — OP NOTE
[facility] [address]                                OPERATIVE REPORT    PATIENT NAME: Oneil Snell                   :        1955  MED REC NO:   [id number]                          ROOM:       2695  ACCOUNT NO:   [de-identified]                           ADMIT DATE: 2021  PROVIDER:     Aristeo Julian MD    DATE OF PROCEDURE:  2021    PREOPERATIVE DIAGNOSIS:  Right colon cancer. POSTOPERATIVE DIAGNOSIS:  Right colon cancer. PROCEDURE PERFORMED:  Open right hemicolectomy with ileocolic  anastomosis. SURGEON:  Aristeo Julian MD    ANESTHESIA:  General.    ESTIMATED BLOOD LOSS:  Less than 50 mL. SPECIMEN:  Right colon. COUNTS:  Sponge and needle counts correct. INDICATIONS:  The patient is a 56-year-old female with a recently  diagnosed large malignant lesion of the ascending colon by colonoscopy  with biopsy. CT scan of the abdomen did not demonstrate any signs of  obvious metastatic disease. She presents now for resection of her  primary tumor. FINDINGS:  1.  Bulky cecal tumor without overt gross signs of metastatic disease. 2.  Right colectomy with stapled ileocolic anastomosis. DESCRIPTION OF THE PROCEDURE:  After informed consent was obtained, the  patient was taken to the operating room and placed in the supine  position. Preoperative antibiotics had been initiated in the holding  area. Athrombic pumps were placed in the legs. General anesthesia was  administered without difficulty. A Kathleen catheter was placed to gravity  in a sterile fashion. The abdomen was prepped and draped in a sterile  fashion. A periumbilical midline incision was created sharply and  carried down through the abdominal wall layers into the peritoneal  cavity. The abdomen was explored. There was easily palpable bulky  tumor in the cecum and ascending colon.   The liver was palpated across both lobes and was smooth without suspicious lesions. The peritoneal  surfaces were palpated and were all grossly normal.    I first began by incising the peritoneal attachments lateral to the  ascending colon and eventually we were able to mobilize the colon upon  its mesentery. I continued mobilizing around the hepatic flexure onto  the proximal transverse colon mobilizing the colon up off of the  underlying duodenum, which was visualized and preserved. The small  bowel was easily mobilized from the distal ileum up to the ileocecal  junction. With the right colon now well mobilized upon its mesentery, I  transected the terminal ileum approximately 10-cm from the cecum and the  transverse colon just distal to the hepatic flexure with linear  staplers. The LigaSure device was used to then take the mesentery down  to the root of the ileocolic vessels just over the surface of the  duodenum. The root of the vessels were controlled with a 2-0 silk  suture ligature with good hemostasis. The right colon was now  completely detached and passed off. The terminal ileum and the transverse colon were then laid side-to-side  in an antiperistaltic fashion. Stay sutures were placed between the two  limbs of the bowel. The corners of each staple line were excised and a  linear stapler was used then to create a side-to-side stapled  anastomosis. The common opening was then lifted up with clamps and  closed with another firing of the stapler. The underlying mesenteric  defect was closed with interrupted 3-0 silk sutures. The anastomosis  was returned to the abdominal cavity. Hemostasis was excellent. The  midline fascia was closed with two running looped 0 PDS sutures. The  skin was closed with 3-0 Vicryl subcutaneous sutures, followed by a 4-0  Monocryl subcuticular stitch and Dermabond. The patient was then  extubated and taken to the recovery room in stable condition.         Danny Jeffrey MD D: 02/04/2021 22:03:31       T: 02/05/2021 0:45:07     [id number]  Job#: [id number]     Doc#: [id number]    CC:  Daja Cisneros MD

## 2021-02-05 NOTE — PROGRESS NOTES
Shift assessment completed and charted. VSS. F/C draining clear, yellow urine. No tolerating much PO at this time, but ice chips, nausea noted this AM, no flatus per pt, BS hypoactive. Pt was able to get up in chair for about an hour this afternoon, tolerated. Midline dressing, C/D/I at this time. Pt encourage to use IS. Chlorhexidine bath given. Colon bundle completed and given to manager. Pt educated on wound care, pt verbalized understanding. Bed alarm on. A&OX4. Bed locked and in lowest position. Call light within reach. Pt denies any other needs at this time. Will continue to monitor.

## 2021-02-05 NOTE — PROGRESS NOTES
Pt a/o. VSS. Shift assessment complete and charted. Pt tolerating ice chips and water. ABD dsg c/d/i. Educated pt on IS and she demonstrated correct use. Oriented pt to colon bundle POC and initiated colon bundle documentation. F/C with yellow, clear urine. BS active in upper and hypoactive in lower quadrants. Lungs clear throughout. Pt states she is passing gas. Pt denies n/v, sob, and states pain is tolerable at this time. Bed locked and in lowest position with bedside table and call light within reach. KENA SCDs in place. Pt stable and denies needs when writer left room.

## 2021-02-05 NOTE — PROGRESS NOTES
PS Dr. Govind Cardoza, \"Pt had a R colectomy today and the anesthesiologist put an order in for an insulin gtt to start 1915. Can I d/c this or is there a reason she is on an insulin gtt? I will put the colon bundle insulin orders in but if she needs a gtt she will have to be transferred to . Her FSBS was 182 and she has no history of DM. Let me know. Thank you.  \"    Genevive Hearing Dr. Govind Cardoza replied, \"OK to dc the drip and start the colon insulin bundle\"

## 2021-02-05 NOTE — CARE COORDINATION
CASE MANAGEMENT INITIAL ASSESSMENT    Reviewed chart and completed assessment with: Patient   Explained Case Management role/services. Primary contact information: Amber Coronado- [facility]     Health Care Decision Maker :   Primary Decision Maker: Sailaja Blackmon Child - [phone]    Secondary Decision Maker: Carmencita Hayward Child - [phone]      Can this person be reached and be able to respond quickly, such as within a few minutes or hours? Yes    Admit date/status:02/04/2021 Inpatient   Diagnosis:Malignant neoplasm of ascending colon ([facility] [address]  Is this a Readmission?:  No      Insurance:BX Traditional    Precert required for SNF: Yes       3 night stay required: Yes    Living arrangements, Adls, care needs, prior to admission: Patient lives at home alone, 2 steps to enter    [facility] at home:  Denies     Services in the home and/or outpatient, prior to admission:Denies     PT/OT recs: on hold until 2/6    [address] Notification (HEN): Only for SNF    Barriers to discharge: bowel return, then advance diet, monitor H/H      Plan/comments: Patient plans to discharge home alone, IPTA. Patient has 2 steps to enter one level living once inside. Patient IPTA, denies needs at this time.  DONNY Aguilar      ECOC on chart for MD signature

## 2021-02-05 NOTE — PROGRESS NOTES
[facility] GENERAL SURGERY    Surgery Progress Note           POD # 1    PATIENT NAME: Liam Ryan     TODAY'S DATE: 2/5/2021    INTERVAL HISTORY:    Pt  Doing well, resting, adequate pain control. Does c/o nausea this AM.  No flatus/BM. OBJECTIVE:   VITALS:  BP (!) 109/59   Pulse 81   Temp 98.1 °F (36.7 °C) (Oral)   Resp 17   Ht 5' 4\" (1.626 m)   Wt 174 lb (78.9 kg)   LMP  (LMP Unknown)   SpO2 93%   Breastfeeding No   BMI 29.87 kg/m²     INTAKE/OUTPUT:    I/O last 3 completed shifts: In: 1138 [P.O.:240; I.V.:898]  Out: 3961 [Urine:1000; Blood:45]  I/O this shift: In: 480 [P.O.:480]  Out: -               CONSTITUTIONAL:  awake and alert  LUNGS:  clear to auscultation  ABDOMEN:   absent bowel sounds, soft, non-distended, tenderness noted at incision   INCISION: clean, dry    Data:  CBC:   Recent Labs     02/04/21  1305 02/04/21  1732 02/05/21  0517   WBC 9.5  --  10.5   HGB 7.3* 6.7* 6.9*   HCT 23.5* 22.2* 22.4*   *  --  424     BMP:    Recent Labs     02/04/21  1305 02/05/21  0517    135*   K 4.5 4.5    103   CO2 25 24   BUN 12 9   CREATININE 0.6 0.6   GLUCOSE 110* 165*     Hepatic: No results for input(s): AST, ALT, ALB, BILITOT, ALKPHOS in the last 72 hours. Mag:    No results for input(s): MG in the last 72 hours. Phos:   No results for input(s): PHOS in the last 72 hours. INR: No results for input(s): INR in the last 72 hours. Radiology Review:       ASSESSMENT AND PLAN:  60 y.o. female status post R colectomy for cancer   - await return of bowel function before advancing diet further   - up OOB   - anemia - stable; had low H/H preop likely due to occult blood loss related to tumor. Will follow for now   - Patients roth catheter to remain in place after 1st post-operative day secondary to decreased functioning, pain and further need to accurately monitor output.    - Prophy - cont Lovenox, Pepcid   - await Pathology report before consulting Oncology Electronically signed by Jana Holt MD

## 2021-02-05 NOTE — PROGRESS NOTES
Pt admitted from PACU to R 328. VSS. Pt oriented to room and asked for assistance. Verbalizes understanding. Ice chips given-tolerates well. Daughter at bedside. Call light within reach, bed in lowest position, wheels locked.

## 2021-02-06 LAB
ANION GAP SERPL CALCULATED.3IONS-SCNC: 5 MMOL/L (ref 3–16)
BASOPHILS ABSOLUTE: 0 K/UL (ref 0–0.2)
BASOPHILS RELATIVE PERCENT: 0.2 %
BUN BLDV-MCNC: 8 MG/DL (ref 7–20)
CALCIUM SERPL-MCNC: 8.4 MG/DL (ref 8.3–10.6)
CHLORIDE BLD-SCNC: 108 MMOL/L (ref 99–110)
CO2: 25 MMOL/L (ref 21–32)
CREAT SERPL-MCNC: 0.6 MG/DL (ref 0.6–1.2)
EOSINOPHILS ABSOLUTE: 0 K/UL (ref 0–0.6)
EOSINOPHILS RELATIVE PERCENT: 0.1 %
GFR AFRICAN AMERICAN: >60
GFR NON-AFRICAN AMERICAN: >60
GLUCOSE BLD-MCNC: 106 MG/DL (ref 70–99)
GLUCOSE BLD-MCNC: 109 MG/DL (ref 70–99)
GLUCOSE BLD-MCNC: 112 MG/DL (ref 70–99)
GLUCOSE BLD-MCNC: 91 MG/DL (ref 70–99)
GLUCOSE BLD-MCNC: 93 MG/DL (ref 70–99)
GLUCOSE BLD-MCNC: 95 MG/DL (ref 70–99)
HCT VFR BLD CALC: 23.1 % (ref 36–48)
HEMOGLOBIN: 7.1 G/DL (ref 12–16)
LYMPHOCYTES ABSOLUTE: 0.8 K/UL (ref 1–5.1)
LYMPHOCYTES RELATIVE PERCENT: 8.3 %
MAGNESIUM: 1.9 MG/DL (ref 1.8–2.4)
MCH RBC QN AUTO: 22.9 PG (ref 26–34)
MCHC RBC AUTO-ENTMCNC: 30.8 G/DL (ref 31–36)
MCV RBC AUTO: 74.3 FL (ref 80–100)
MONOCYTES ABSOLUTE: 0.5 K/UL (ref 0–1.3)
MONOCYTES RELATIVE PERCENT: 4.6 %
NEUTROPHILS ABSOLUTE: 8.6 K/UL (ref 1.7–7.7)
NEUTROPHILS RELATIVE PERCENT: 86.8 %
PDW BLD-RTO: 17.1 % (ref 12.4–15.4)
PERFORMED ON: ABNORMAL
PERFORMED ON: ABNORMAL
PERFORMED ON: NORMAL
PLATELET # BLD: 494 K/UL (ref 135–450)
PMV BLD AUTO: 7.4 FL (ref 5–10.5)
POTASSIUM SERPL-SCNC: 4.1 MMOL/L (ref 3.5–5.1)
RBC # BLD: 3.11 M/UL (ref 4–5.2)
SODIUM BLD-SCNC: 138 MMOL/L (ref 136–145)
WBC # BLD: 9.9 K/UL (ref 4–11)

## 2021-02-06 PROCEDURE — 2580000003 HC RX 258: Performed by: SURGERY

## 2021-02-06 PROCEDURE — 97535 SELF CARE MNGMENT TRAINING: CPT

## 2021-02-06 PROCEDURE — 97116 GAIT TRAINING THERAPY: CPT

## 2021-02-06 PROCEDURE — 1200000000 HC SEMI PRIVATE

## 2021-02-06 PROCEDURE — 85025 COMPLETE CBC W/AUTO DIFF WBC: CPT

## 2021-02-06 PROCEDURE — 99024 POSTOP FOLLOW-UP VISIT: CPT | Performed by: SURGERY

## 2021-02-06 PROCEDURE — 80048 BASIC METABOLIC PNL TOTAL CA: CPT

## 2021-02-06 PROCEDURE — 6370000000 HC RX 637 (ALT 250 FOR IP): Performed by: SURGERY

## 2021-02-06 PROCEDURE — 83735 ASSAY OF MAGNESIUM: CPT

## 2021-02-06 PROCEDURE — 97161 PT EVAL LOW COMPLEX 20 MIN: CPT

## 2021-02-06 PROCEDURE — 6360000002 HC RX W HCPCS: Performed by: SURGERY

## 2021-02-06 PROCEDURE — 36415 COLL VENOUS BLD VENIPUNCTURE: CPT

## 2021-02-06 PROCEDURE — 97165 OT EVAL LOW COMPLEX 30 MIN: CPT

## 2021-02-06 RX ADMIN — FAMOTIDINE 20 MG: 20 TABLET, FILM COATED ORAL at 20:51

## 2021-02-06 RX ADMIN — FAMOTIDINE 20 MG: 20 TABLET, FILM COATED ORAL at 08:17

## 2021-02-06 RX ADMIN — SODIUM CHLORIDE: 9 INJECTION, SOLUTION INTRAVENOUS at 08:17

## 2021-02-06 RX ADMIN — SODIUM CHLORIDE, PRESERVATIVE FREE 10 ML: 5 INJECTION INTRAVENOUS at 08:17

## 2021-02-06 RX ADMIN — ENOXAPARIN SODIUM 40 MG: 40 INJECTION SUBCUTANEOUS at 08:17

## 2021-02-06 NOTE — PROGRESS NOTES
Pt a/o. VSS. Shift assessment complete and charted. ABD dressing c/d/i. Pt denies pain, n/v, and sob. Pt states she has been using IS as designed. Pt ambulated in room about  feet and tolerated well. Lungs clear throughout. BS Hypoactive throughout. Pt states she thought she was passing gas last night but she does not think she was but states she has been belching. Oriented pt to POC. Pt denies needs. Bed locked and in lowest position. Pt stable when writer left room.

## 2021-02-06 NOTE — PROGRESS NOTES
Shift assessment completed and charted. VSS. Pt sitting up in chair. F/C removed about 0900, pt tolerated well. A&OX4. Denies any nausea. No need for PRN pain meds at this time, pt denies need. Refuses bed alarm, calls out appropriately. Able to take small bites this AM of clears, without nausea, some noted fullness. Pt denies passing any flatus. Bed locked and in lowest position. Call light within reach. Pt denies any other needs at this time. Will continue to monitor.

## 2021-02-06 NOTE — PROGRESS NOTES
[facility] GENERAL SURGERY    Surgery Progress Note           POD # 2    PATIENT NAME: Jonathan Ryan     TODAY'S DATE: 2/6/2021    INTERVAL HISTORY:    Pt  Doing well, up in chair, taking small amounts of clears, no nausea, no flatus/BM. OBJECTIVE:   VITALS:  /76   Pulse 75   Temp 98.6 °F (37 °C) (Oral)   Resp 16   Ht 5' 4\" (1.626 m)   Wt 174 lb (78.9 kg)   LMP  (LMP Unknown)   SpO2 94%   Breastfeeding No   BMI 29.87 kg/m²     INTAKE/OUTPUT:    I/O last 3 completed shifts: In: 2666 [P.O.:370; I.V.:2296]  Out: 3055 [Urine:3055]  No intake/output data recorded. CONSTITUTIONAL:  awake and alert  LUNGS:  clear to auscultation  ABDOMEN:   hypoactive bowel sounds, soft, non-distended, non-tender   INCISION: clean, dry, no drainage    Data:  CBC:   Recent Labs     02/04/21  1305 02/04/21  1732 02/05/21  0517 02/06/21  0557   WBC 9.5  --  10.5 9.9   HGB 7.3* 6.7* 6.9* 7.1*   HCT 23.5* 22.2* 22.4* 23.1*   *  --  424 494*     BMP:    Recent Labs     02/04/21  1305 02/05/21  0517 02/06/21  0556    135* 138   K 4.5 4.5 4.1    103 108   CO2 25 24 25   BUN 12 9 8   CREATININE 0.6 0.6 0.6   GLUCOSE 110* 165* 112*     Hepatic: No results for input(s): AST, ALT, ALB, BILITOT, ALKPHOS in the last 72 hours. Mag:      Recent Labs     02/06/21  0556   MG 1.90      Phos:   No results for input(s): PHOS in the last 72 hours. INR: No results for input(s): INR in the last 72 hours.       Radiology Review:       ASSESSMENT AND PLAN:  60 y.o. female status post R colectomy for cancer   - d/c Kathleen   - trial full liquids, advance as tolerated   - H/H - stable   - await Patho   - cont Pepcid/Lovenox   - increase up OOB         Electronically signed by Raúl Gauthier MD

## 2021-02-06 NOTE — PROGRESS NOTES
Transfers  Sit to stand: Independent  Stand to sit: Independent     Cognition  Overall Cognitive Status: WFL        Sensation  Overall Sensation Status: WNL        LUE AROM (degrees)  LUE AROM : WFL  RUE AROM (degrees)  RUE AROM : WFL  LUE Strength  Gross LUE Strength: WFL  RUE Strength  Gross RUE Strength: WFL                   Plan   Plan  Times per week: 1x only      Goals  Short term goals  Short term goal 1: Pt will complete standing level adls with S.  GOAL MET       Therapy Time   Individual Concurrent Group Co-treatment   Time In 0804         Time Out 0826         Minutes 22         Timed Code Treatment Minutes: 12 Minutes(10 minutes for evaluation)       CATALINA Denney/L

## 2021-02-06 NOTE — PROGRESS NOTES
Physical Therapy    Facility/Department: [place] [address] TELE/MED SURG/ONC  Initial Assessment/Treatment/Discharge    NAME: Aidan Lewis  : 1955  MRN: [id number]    Date of Service: 2021    Discharge Recommendations:  Home with assist PRN   PT Equipment Recommendations  Equipment Needed: No    Assessment   Body structures, Functions, Activity limitations: Decreased functional mobility ; Decreased endurance  Assessment: Pt is a 60 y.o. female admitted to [facility] secondary to colonic mass, pt is s/p open colectomy . Pt reports she lives aone in one level home with 1-2 RADHA and reports family/friends can provide assist PRN at d/c. Pt is typically I with all functional mobility and gait without an AD. Pt is currently functioning at/near her baseline demonstrating t/f with I, gait without AD with S/MI. Pt reports her mobility is at/near her baseline as well and reports no concerns regarding mobility at home. D/t pts current LOF no further acute PT indicated. Recommend pt d/c home with assist PRN. Treatment Diagnosis: Impaired balance  Prognosis: Excellent  Decision Making: Low Complexity  PT Education: Goals; General Safety;Gait Training;PT Role;Disease Specific Education;Plan of Care; Functional Mobility Training;Precautions; Injury Prevention;Transfer Training;Energy Conservation  Patient Education: Pt educated regarding benefit of activity with short bouts as tolerated, importance of gait activities, use of compensatory stategies for mobility dependent on pain. Pt verbalized understanding  Barriers to Learning: none  No Skilled PT: Independent with functional mobility   REQUIRES PT FOLLOW UP: No  Activity Tolerance  Activity Tolerance: Patient Tolerated treatment well  Activity Tolerance: /76, HR 75, SpO2 94% on RA       Patient Diagnosis(es): The encounter diagnosis was Colonic mass. has a past medical history of Anemia, Cancer ([address] and History of scarlet fever. has a past surgical history that includes knee surgery;  section; Upper gastrointestinal endoscopy (N/A, 2021); Colonoscopy (N/A, 2021); Colonoscopy (2021); and Small intestine surgery (Right, 2021).     Restrictions  Restrictions/Precautions  Restrictions/Precautions: General Precautions, Fall Risk  Required Braces or Orthoses?: Yes  Required Braces or Orthoses  Other: Abdominal Binder(OOB for comfort)  Position Activity Restriction  Other position/activity restrictions: up in chair, abdominal binder as needed  Vision/Hearing  Vision: Impaired  Vision Exceptions: Wears glasses at all times(typically wears contacts)  Hearing: Within functional limits     Subjective  General  Patient assessed for rehabilitation services?: Yes  Response To Previous Treatment: Not applicable  Family / Caregiver Present: No  Referring Practitioner: Thomas Dowd MD  Referral Date : 21  Diagnosis: Colonic mass s/p open R colectomy 21  Follows Commands: Within Functional Limits  General Comment  Comments: RN cleared pt for session  Subjective  Subjective: Pt seated up in chair on arrival, pleasant and agreeable, denies c/o pain but reports feeling \"bloated\"  Pain Screening  Patient Currently in Pain: Denies(Reports feeling \"bloated\" but no pain)  Vital Signs  Patient Currently in Pain: Denies(Reports feeling \"bloated\" but no pain)       Orientation  Orientation  Overall Orientation Status: Within Normal Limits  Social/Functional History  Social/Functional History  Lives With: Alone  Type of Home: House  Home Layout: One level  Home Access: Stairs to enter without rails  Entrance Stairs - Number of Steps: 1 through front door; 2 through garage  Bradley Shower/Tub: Walk-in shower  Bathroom Toilet: Handicap height  Bathroom Equipment: Built-in shower seat, Grab bars in shower  Home Equipment: [employer], Standard walker, Cane  ADL Assistance: [address] [place] [address] Avenue: Independent Homemaking Responsibilities: Yes  Meal Prep Responsibility: Primary  Laundry Responsibility: Primary  Cleaning Responsibility: Primary  Shopping Responsibility: Primary  Ambulation Assistance: Independent(Without AD)  Transfer Assistance: Independent  Active : Yes  Occupation: Full time employment  Type of occupation: , lisette  Additional Comments: Pt denies hx of falls in past year      Objective     Observation/Palpation  Posture: Good    AROM RLE (degrees)  RLE AROM: WFL  AROM LLE (degrees)  LLE AROM : WFL     Strength RLE  Strength RLE: WFL  Strength LLE  Strength LLE: WFL        Sensation  Overall Sensation Status: WNL     Bed mobility  Supine to Sit: Unable to assess  Sit to Supine: Unable to assess  Comment: Pt seated in chair at start and end of session, reports slightly increased pain with completing with nsg, educated in use of log roll technique for decreased c/o pain     Transfers  Sit to Stand: Independent  Stand to sit: Independent  Comment: without AD     Ambulation  Ambulation?: Yes  More Ambulation?: No  Ambulation 1  Surface: level tile  Device: No Device  Assistance: Supervision;Modified Independent  Quality of Gait: Reciprocal pattern, slight decreased heel strike. Mildly unsteady with self correction.   Gait Deviations: Slow Leonela;Decreased step length;Decreased step height;Decreased arm swing  Distance: 15' + 200'  Comments: Distances limited by fatigue and discomfort from abdominal binder  Stairs/Curb  Stairs?: No        Balance  Posture: Good  Sitting - Static: Good  Sitting - Dynamic: Good  Standing - Static: Good;-  Standing - Dynamic: Fair;+        Plan   Plan  Times per week: D/C  Current Treatment Recommendations: Balance Training, Endurance Training, Gait Training, Transfer Training, Stair training, Strengthening, Safety Education & Training, Patient/Caregiver Education & Training, Functional Mobility Training  Safety Devices

## 2021-02-07 LAB
ANION GAP SERPL CALCULATED.3IONS-SCNC: 10 MMOL/L (ref 3–16)
BASOPHILS ABSOLUTE: 0 K/UL (ref 0–0.2)
BASOPHILS RELATIVE PERCENT: 0.5 %
BUN BLDV-MCNC: 6 MG/DL (ref 7–20)
CALCIUM SERPL-MCNC: 8.9 MG/DL (ref 8.3–10.6)
CHLORIDE BLD-SCNC: 105 MMOL/L (ref 99–110)
CO2: 26 MMOL/L (ref 21–32)
CREAT SERPL-MCNC: <0.5 MG/DL (ref 0.6–1.2)
EOSINOPHILS ABSOLUTE: 0 K/UL (ref 0–0.6)
EOSINOPHILS RELATIVE PERCENT: 0.6 %
GFR AFRICAN AMERICAN: >60
GFR NON-AFRICAN AMERICAN: >60
GLUCOSE BLD-MCNC: 109 MG/DL (ref 70–99)
GLUCOSE BLD-MCNC: 130 MG/DL (ref 70–99)
GLUCOSE BLD-MCNC: 87 MG/DL (ref 70–99)
GLUCOSE BLD-MCNC: 90 MG/DL (ref 70–99)
GLUCOSE BLD-MCNC: 96 MG/DL (ref 70–99)
HCT VFR BLD CALC: 25.7 % (ref 36–48)
HEMOGLOBIN: 7.7 G/DL (ref 12–16)
LYMPHOCYTES ABSOLUTE: 1.1 K/UL (ref 1–5.1)
LYMPHOCYTES RELATIVE PERCENT: 18.8 %
MAGNESIUM: 1.8 MG/DL (ref 1.8–2.4)
MCH RBC QN AUTO: 22.3 PG (ref 26–34)
MCHC RBC AUTO-ENTMCNC: 30 G/DL (ref 31–36)
MCV RBC AUTO: 74.3 FL (ref 80–100)
MONOCYTES ABSOLUTE: 0.3 K/UL (ref 0–1.3)
MONOCYTES RELATIVE PERCENT: 5.6 %
NEUTROPHILS ABSOLUTE: 4.5 K/UL (ref 1.7–7.7)
NEUTROPHILS RELATIVE PERCENT: 74.5 %
PDW BLD-RTO: 16.9 % (ref 12.4–15.4)
PERFORMED ON: ABNORMAL
PERFORMED ON: ABNORMAL
PERFORMED ON: NORMAL
PERFORMED ON: NORMAL
PLATELET # BLD: 586 K/UL (ref 135–450)
PMV BLD AUTO: 7.7 FL (ref 5–10.5)
POTASSIUM REFLEX MAGNESIUM: 3.5 MMOL/L (ref 3.5–5.1)
RBC # BLD: 3.45 M/UL (ref 4–5.2)
SODIUM BLD-SCNC: 141 MMOL/L (ref 136–145)
WBC # BLD: 6 K/UL (ref 4–11)

## 2021-02-07 PROCEDURE — 80048 BASIC METABOLIC PNL TOTAL CA: CPT

## 2021-02-07 PROCEDURE — 2580000003 HC RX 258: Performed by: SURGERY

## 2021-02-07 PROCEDURE — 6360000002 HC RX W HCPCS: Performed by: SURGERY

## 2021-02-07 PROCEDURE — 36415 COLL VENOUS BLD VENIPUNCTURE: CPT

## 2021-02-07 PROCEDURE — 83735 ASSAY OF MAGNESIUM: CPT

## 2021-02-07 PROCEDURE — 85025 COMPLETE CBC W/AUTO DIFF WBC: CPT

## 2021-02-07 PROCEDURE — 99024 POSTOP FOLLOW-UP VISIT: CPT | Performed by: SURGERY

## 2021-02-07 PROCEDURE — 1200000000 HC SEMI PRIVATE

## 2021-02-07 PROCEDURE — 6370000000 HC RX 637 (ALT 250 FOR IP): Performed by: SURGERY

## 2021-02-07 RX ADMIN — SODIUM CHLORIDE: 9 INJECTION, SOLUTION INTRAVENOUS at 04:49

## 2021-02-07 RX ADMIN — SODIUM CHLORIDE, PRESERVATIVE FREE 10 ML: 5 INJECTION INTRAVENOUS at 20:30

## 2021-02-07 RX ADMIN — ENOXAPARIN SODIUM 40 MG: 40 INJECTION SUBCUTANEOUS at 07:49

## 2021-02-07 RX ADMIN — SODIUM CHLORIDE: 9 INJECTION, SOLUTION INTRAVENOUS at 07:48

## 2021-02-07 RX ADMIN — SODIUM CHLORIDE, PRESERVATIVE FREE 10 ML: 5 INJECTION INTRAVENOUS at 07:53

## 2021-02-07 RX ADMIN — FAMOTIDINE 20 MG: 20 TABLET, FILM COATED ORAL at 20:28

## 2021-02-07 RX ADMIN — FAMOTIDINE 20 MG: 20 TABLET, FILM COATED ORAL at 07:48

## 2021-02-07 NOTE — PROGRESS NOTES
Shift assessment completed and charted. VSS. A&OX4. Passing flatus multiple times this AM. Tolerating fulls at this time, no c/o for pain or nausea. Ambulating halls. Bed locked and in lowest position. Call light within reach. Pt denies any other needs at this time. Will continue to monitor.

## 2021-02-07 NOTE — PLAN OF CARE
Problem: Pain:  Goal: Control of acute pain  Description: Control of acute pain  Outcome: Ongoing  Note: Pt denies pain and discomfort at this time. Pt is aware of pain medication regimen. Pt instructed to call out when pain medication is needed. Pt given info related to non pharmacological pain management options. Pt verbalized understanding. Will continue to assess and reassess pain.

## 2021-02-07 NOTE — PROGRESS NOTES
Pt is alert and oriented. VSS. RA. Pt denies pain and and discomfort at this time. Midline incisions CDI with glue. Shift assessment completed and documented. Call light within reach. Bed side table within reach. Wheels locked. Bed in lowest position. Pt instructed to call out for assistance. Pt expressesed understanding & calls out appropritately. All care per orders. Will continue to monitor.  Electronically signed by Romina Pelaez RN on 2/6/2021 at 9:00 PM

## 2021-02-07 NOTE — PROGRESS NOTES
[facility] GENERAL SURGERY    Surgery Progress Note           POD # 3    PATIENT NAME: Rosemary Ryan     TODAY'S DATE: 2/7/2021    INTERVAL HISTORY:    Pt  Doing well, minimal pain, frequent flatus, no BM, taking clears well, hungry for solid food, ambulating. OBJECTIVE:   VITALS:  BP (!) 161/81   Pulse 79   Temp 97.6 °F (36.4 °C) (Axillary)   Resp 16   Ht 5' 4\" (1.626 m)   Wt 174 lb (78.9 kg)   LMP  (LMP Unknown)   SpO2 96%   Breastfeeding No   BMI 29.87 kg/m²     INTAKE/OUTPUT:    I/O last 3 completed shifts: In: 3821 [P.O.:880; I.V.:2477]  Out: 1000 [Urine:1000]  I/O this shift: In: 591 [P.O.:360; I.V.:231]  Out: -               CONSTITUTIONAL:  awake and alert  LUNGS:     ABDOMEN:    , soft, non-distended, non-tender   INCISION: clean, dry, no drainage    Data:  CBC:   Recent Labs     02/04/21  1305 02/04/21  1732 02/05/21  0517 02/06/21  0557   WBC 9.5  --  10.5 9.9   HGB 7.3* 6.7* 6.9* 7.1*   HCT 23.5* 22.2* 22.4* 23.1*   *  --  424 494*     BMP:    Recent Labs     02/04/21  1305 02/05/21  0517 02/06/21  0556    135* 138   K 4.5 4.5 4.1    103 108   CO2 25 24 25   BUN 12 9 8   CREATININE 0.6 0.6 0.6   GLUCOSE 110* 165* 112*     Hepatic: No results for input(s): AST, ALT, ALB, BILITOT, ALKPHOS in the last 72 hours. Mag:      Recent Labs     02/06/21  0556   MG 1.90      Phos:   No results for input(s): PHOS in the last 72 hours. INR: No results for input(s): INR in the last 72 hours.       Radiology Review:       ASSESSMENT AND PLAN:  60 y.o. female status post R colectomy for cancer   - HLIV   - soft diet   - cont up OOB   - await Path   - likely d/c home tomorrow, can arrange outpatient Onc f/u if pt is ready to go before Path comes back         Electronically signed by Valeria Bowen MD

## 2021-02-08 ENCOUNTER — TELEPHONE (OUTPATIENT)
Dept: SURGERY | Age: 66
End: 2021-02-08

## 2021-02-08 VITALS
BODY MASS INDEX: 29.71 KG/M2 | RESPIRATION RATE: 16 BRPM | OXYGEN SATURATION: 97 % | SYSTOLIC BLOOD PRESSURE: 163 MMHG | WEIGHT: 174 LBS | HEIGHT: 64 IN | HEART RATE: 80 BPM | TEMPERATURE: 98.6 F | DIASTOLIC BLOOD PRESSURE: 81 MMHG

## 2021-02-08 LAB
GLUCOSE BLD-MCNC: 117 MG/DL (ref 70–99)
PERFORMED ON: ABNORMAL

## 2021-02-08 PROCEDURE — 6360000002 HC RX W HCPCS: Performed by: SURGERY

## 2021-02-08 PROCEDURE — 6370000000 HC RX 637 (ALT 250 FOR IP): Performed by: SURGERY

## 2021-02-08 PROCEDURE — 99024 POSTOP FOLLOW-UP VISIT: CPT | Performed by: SURGERY

## 2021-02-08 RX ADMIN — FAMOTIDINE 20 MG: 20 TABLET, FILM COATED ORAL at 08:07

## 2021-02-08 RX ADMIN — ENOXAPARIN SODIUM 40 MG: 40 INJECTION SUBCUTANEOUS at 08:07

## 2021-02-08 NOTE — DISCHARGE INSTR - DIET

## 2021-02-08 NOTE — DISCHARGE INSTR - COC
Continuity of Care Form    Patient Name: Martha Serna   :  1955  MRN:  [id number]    Admit date:  2021  Discharge date:  2021    Code Status Order: Full Code   Advance Directives:   [facility] Documentation     Date/Time Healthcare Directive Type of Healthcare Directive Copy in [facility] [address] Agent's Name Healthcare Agent's Phone Number    [phone]  No, patient does not have an advance directive for healthcare treatment -- -- -- -- --          Admitting Physician:  Chele Monte MD  PCP: PRANAV Giles CNP    Discharging Nurse:  Stephan Pulido Unit/Room#: 3885/8230-38  Discharging Unit Phone Number: [phone]    Emergency Contact:   Extended Emergency Contact Information  Primary Emergency Contact: Colleen Corona  [place] Phone: [phone]  Relation: Child  Secondary Emergency Contact: [facility] [employer] [place] Phone: [phone]  Relation: Child    Past Surgical History:  Past Surgical History:   Procedure Laterality Date     SECTION      COLONOSCOPY N/A 2021    [facility]    COLONOSCOPY SUBMUCOSAL TATTOO INJECTION performed by Donita Scott MD at [address] Right 2021    OPEN RIGHT COLECTOMY performed by Chele Monte MD at [address] 2021    EGD BIOPSY performed by Donita Scott MD at [address]       Immunization History:   Immunization History   Administered Date(s) Administered    Hepatitis A Adult (Havrix, Vaqta) 2019    Influenza Virus Vaccine 10/23/2019, 2020, 10/25/2020    Pneumococcal Polysaccharide (Elsnrxiwv27) 10/28/2016    Tdap (Boostrix, Adacel) 10/23/2019    Zoster Recombinant (Shingrix) 2020, 2020       Active Problems: Patient Active Problem List   Diagnosis Code    Iron deficiency anemia D50.9    Colon cancer screening Z12.11    Malignant neoplasm of ascending colon ([address] C18.2    Diverticulosis of colon K57.30    S/P partial colectomy Z90.49    Colonic mass K63.89       Isolation/Infection:   Isolation          No Isolation        Patient Infection Status     Infection Onset Added Last Indicated Last Indicated By Review Planned Expiration Resolved Resolved By    None active    Resolved    COVID-19 Rule Out 02/04/21 02/04/21 02/04/21 COVID-19 (Ordered)   02/04/21 Rule-Out Test Resulted          Nurse Assessment:  Last Vital Signs: BP (!) 163/81   Pulse 80   Temp 98.6 °F (37 °C) (Oral)   Resp 16   Ht 5' 4\" (1.626 m)   Wt 174 lb (78.9 kg)   LMP  (LMP Unknown)   SpO2 97%   Breastfeeding No   BMI 29.87 kg/m²     Last documented pain score (0-10 scale): Pain Level: 7  Last Weight:   Wt Readings from Last 1 Encounters:   02/06/21 174 lb (78.9 kg)     Mental Status:  oriented, alert, coherent, logical, thought processes intact and able to concentrate and follow conversation    IV Access:  - None    Nursing Mobility/ADLs:  Walking   Independent  Transfer  Independent  Bathing  Independent  Dressing  Independent  Toileting  Independent  Feeding  [facility] Delivery   whole    Wound Care Documentation and Therapy:        Elimination:  Continence:   · Bowel: Yes  · Bladder: Yes  Urinary Catheter: None   Colostomy/Ileostomy/Ileal Conduit: No       Date of Last BM: ***    Intake/Output Summary (Last 24 hours) at 2/8/2021 1058  Last data filed at 2/8/2021 0432  Gross per 24 hour   Intake 1090 ml   Output    Net 1090 ml     I/O last 3 completed shifts: In: 6162 [P.O.:1440; I.V.:241]  Out: -     Safety Concerns:     None    Impairments/Disabilities:      None    Nutrition Therapy:  Current Nutrition Therapy:   - Oral Diet:  Low Fiber    Routes of Feeding: Oral  Liquids:  Thin Liquids Daily Fluid Restriction: No  Last Modified Barium Swallow with Video (Video Swallowing Test): not done    Treatments at the Time of Hospital Discharge:   Respiratory Treatments: ***  Oxygen Therapy:  is not on home oxygen therapy. Ventilator:    - No ventilator support    Rehab Therapies: {THERAPEUTIC INTERVENTION:4178323975}  Weight Bearing Status/Restrictions: No weight bearing restirctions  Other Medical Equipment (for information only, NOT a DME order):  {EQUIPMENT:184990068}  Other Treatments: ***    Patient's personal belongings (please select all that are sent with patient):  None    RN SIGNATURE:  Electronically signed by Filomena Cabello RN on 2/8/21 at 11:07 AM EST    CASE MANAGEMENT/SOCIAL WORK SECTION    Inpatient Status Date: ***    Readmission Risk Assessment Score:  Readmission Risk              Risk of Unplanned Readmission:        11           Discharging to Facility/ Agency   · Name:   · Address:  · Phone:  · Fax:    Dialysis Facility (if applicable)   · Name:  · Address:  · Dialysis Schedule:  · Phone:  · Fax:    / signature: {Esignature:606900127}    PHYSICIAN SECTION    Prognosis: {Prognosis:6393641419}    Condition at Discharge: [address]Teri Stiles Miguelangel Patient Condition:293391393}    Rehab Potential (if transferring to Rehab): {Prognosis:4751014257}    Recommended Labs or Other Treatments After Discharge: ***    Physician Certification: I certify the above information and transfer of Osorio Bar  is necessary for the continuing treatment of the diagnosis listed and that she requires {Admit to Appropriate Level of Care:80057} for {GREATER/LESS:090911007} 30 days.      Update Admission H&P: {CHP DME Changes in LZEYL:110107567}    PHYSICIAN SIGNATURE:  {Esignature:462126938}

## 2021-02-08 NOTE — TELEPHONE ENCOUNTER
Koleen Cushing from [facility] called to obtain Dx code, CPT code and disability date for pt which I provided:  CPT: 93766 (Open R Colectomy)  Dx Code: F81.18  Disability date: 2/4/21

## 2021-02-08 NOTE — DISCHARGE SUMMARY
Surgery Discharge Summary    Patient Identification  Juan Durant is a 60 y.o. female. :  1955  Admit Date:  2021    Discharge date:   No discharge date for patient encounter. Disposition: home    Discharge Diagnoses:   Principal Problem:    Malignant neoplasm of ascending colon ([address]  Active Problems:    S/P partial colectomy    Colonic mass  Resolved Problems:    * No resolved hospital problems. *      Discharge condition: good    Discharge Medications:     Current Discharge Medication List      CONTINUE these medications which have NOT CHANGED    Details   ferrous sulfate (IRON 325) 325 (65 Fe) MG tablet Take 1 tablet by mouth daily (with breakfast)  Qty: 90 tablet, Refills: 1    Associated Diagnoses: Iron deficiency anemia, unspecified iron deficiency anemia type                Current Discharge Medication List            Most Recent Labs:    CBC:   Recent Labs     21  0557 21  1157   WBC 9.9 6.0   HGB 7.1* 7.7*   HCT 23.1* 25.7*   * 586*     BMP:    Recent Labs     21  0556 21  1157    141   K 4.1 3.5    105   CO2 25 26   BUN 8 6*   CREATININE 0.6 <0.5*   GLUCOSE 112* 90     Hepatic: No results for input(s): AST, ALT, ALB, BILITOT, ALKPHOS in the last 72 hours. PT/INR:  No results for input(s): INR in the last 72 hours. Consults: none    Surgery: right colectomy    Patient Instructions: Activity: no heavy lifting, pushing, pulling for 6 weeks, no driving while on analgesics  Diet: As tolerated  Follow-up with Dr. Roselia Wyatt in 2 weeks. The patient and/or family/patient representatives, were provided education regarding discharge instructions, ongoing treatment and follow-up. Details of information given to the patient may be found in the discharge instructions located in the EMR. HPI and Hospital Course:   Admitted after right colectomy. Uneventful post-op course.  Home POD#4      RULA HACKETT

## 2021-02-08 NOTE — PROGRESS NOTES
Pt is alert and oriented. VSS. RA. Pt denies pain and and discomfort at this time. Midline incisions CDI with glue. Shift assessment completed and documented. Call light within reach. Bed side table within reach. Wheels locked. Bed in lowest position. Pt instructed to call out for assistance. Pt expressesed understanding & calls out appropritately. All care per orders. Will continue to monitor.  Electronically signed by Blayne Troncoso RN on 2/7/2021 at 9:22 PM

## 2021-02-09 ENCOUNTER — TELEPHONE (OUTPATIENT)
Dept: FAMILY MEDICINE CLINIC | Age: 66
End: 2021-02-09

## 2021-02-09 NOTE — TELEPHONE ENCOUNTER
[address] Transitions Initial Follow Up Call    Outreach made within 2 business days of discharge: Yes    Patient: Gwendolyn Carrera Patient : 1955   MRN: <[id number]>  Reason for Admission: There are no discharge diagnoses documented for the most recent discharge. Discharge Date: 21       Spoke with: Patient    Discharge department/facility: [facility]    TCM Interactive Patient Contact:  Was patient able to fill all prescriptions: Yes  Was patient instructed to bring all medications to the follow-up visit: Yes  Is patient taking all medications as directed in the discharge summary?  Yes  Does patient understand their discharge instructions: Yes  Does patient have questions or concerns that need addressed prior to 7-14 day follow up office visit: no    Scheduled appointment with PCP within 7-14 days    Follow Up  Future Appointments   Date Time Provider Elkin Dumas   2/10/2021 11:20 AM Flynn Cradle, APRN - CNP [facility]   3/25/2021  8:00 AM PRANAV Stratton CNP

## 2021-02-10 ENCOUNTER — OFFICE VISIT (OUTPATIENT)
Dept: FAMILY MEDICINE CLINIC | Age: 66
End: 2021-02-10
Payer: COMMERCIAL

## 2021-02-10 VITALS
OXYGEN SATURATION: 100 % | DIASTOLIC BLOOD PRESSURE: 68 MMHG | RESPIRATION RATE: 16 BRPM | BODY MASS INDEX: 29.71 KG/M2 | HEIGHT: 64 IN | TEMPERATURE: 97.3 F | HEART RATE: 67 BPM | SYSTOLIC BLOOD PRESSURE: 120 MMHG | WEIGHT: 174 LBS

## 2021-02-10 DIAGNOSIS — Z09 HOSPITAL DISCHARGE FOLLOW-UP: Primary | ICD-10-CM

## 2021-02-10 DIAGNOSIS — D50.9 IRON DEFICIENCY ANEMIA, UNSPECIFIED IRON DEFICIENCY ANEMIA TYPE: ICD-10-CM

## 2021-02-10 LAB
ANION GAP SERPL CALCULATED.3IONS-SCNC: 9 MMOL/L (ref 3–16)
BASOPHILS ABSOLUTE: 0 K/UL (ref 0–0.2)
BASOPHILS RELATIVE PERCENT: 0.4 %
BUN BLDV-MCNC: 14 MG/DL (ref 7–20)
CALCIUM SERPL-MCNC: 9.5 MG/DL (ref 8.3–10.6)
CHLORIDE BLD-SCNC: 102 MMOL/L (ref 99–110)
CO2: 27 MMOL/L (ref 21–32)
CREAT SERPL-MCNC: 0.7 MG/DL (ref 0.6–1.2)
EOSINOPHILS ABSOLUTE: 0.1 K/UL (ref 0–0.6)
EOSINOPHILS RELATIVE PERCENT: 1.1 %
GFR AFRICAN AMERICAN: >60
GFR NON-AFRICAN AMERICAN: >60
GLUCOSE BLD-MCNC: 109 MG/DL (ref 70–99)
HCT VFR BLD CALC: 26.3 % (ref 36–48)
HEMOGLOBIN: 8 G/DL (ref 12–16)
LYMPHOCYTES ABSOLUTE: 1.5 K/UL (ref 1–5.1)
LYMPHOCYTES RELATIVE PERCENT: 17.5 %
MCH RBC QN AUTO: 22.2 PG (ref 26–34)
MCHC RBC AUTO-ENTMCNC: 30.3 G/DL (ref 31–36)
MCV RBC AUTO: 73.4 FL (ref 80–100)
MONOCYTES ABSOLUTE: 0.5 K/UL (ref 0–1.3)
MONOCYTES RELATIVE PERCENT: 5.8 %
NEUTROPHILS ABSOLUTE: 6.6 K/UL (ref 1.7–7.7)
NEUTROPHILS RELATIVE PERCENT: 75.2 %
PDW BLD-RTO: 17.3 % (ref 12.4–15.4)
PLATELET # BLD: 567 K/UL (ref 135–450)
PMV BLD AUTO: 7.6 FL (ref 5–10.5)
POTASSIUM SERPL-SCNC: 4.4 MMOL/L (ref 3.5–5.1)
RBC # BLD: 3.58 M/UL (ref 4–5.2)
SODIUM BLD-SCNC: 138 MMOL/L (ref 136–145)
WBC # BLD: 8.7 K/UL (ref 4–11)

## 2021-02-10 PROCEDURE — 1111F DSCHRG MED/CURRENT MED MERGE: CPT | Performed by: NURSE PRACTITIONER

## 2021-02-10 PROCEDURE — 99495 TRANSJ CARE MGMT MOD F2F 14D: CPT | Performed by: NURSE PRACTITIONER

## 2021-02-10 ASSESSMENT — ENCOUNTER SYMPTOMS
COLOR CHANGE: 0
ABDOMINAL PAIN: 0
ANAL BLEEDING: 0
NAUSEA: 0
RESPIRATORY NEGATIVE: 1
CONSTIPATION: 0
BLOOD IN STOOL: 0
RECTAL PAIN: 0
ABDOMINAL DISTENTION: 0
DIARRHEA: 0
VOMITING: 0

## 2021-02-10 NOTE — PROGRESS NOTES
Post-Discharge Transitional Care Management Services or Hospital Follow Up      Osorio Bar   YOB: 1955    Date of Office Visit:  2/10/2021  Date of Hospital Admission: 2/4/21  Date of Hospital Discharge: 2/8/21  Readmission Risk Score(high >=14%. Medium >=10%):Readmission Risk Score: 11      Care management risk score Rising risk (score 2-5) and Complex Care (Scores >=6): 0     Non face to face  following discharge, date last encounter closed (first attempt may have been earlier): 2/9/2021 11:14 AM 2/9/2021 11:14 AM    Call initiated 2 business days of discharge: Yes     Patient Active Problem List   Diagnosis    Iron deficiency anemia    Colon cancer screening    Malignant neoplasm of ascending colon ([address]    Diverticulosis of colon    S/P partial colectomy    Colonic mass       Allergies   Allergen Reactions    Novocain [Procaine] Rash       Medications listed as ordered at the time of discharge from hospital   [address] Medication Instructions LORIE:    Printed on:02/10/21 1223   Medication Information                      ferrous sulfate (IRON 325) 325 (65 Fe) MG tablet  Take 1 tablet by mouth daily (with breakfast)                   Medications marked \"taking\" at this time  Outpatient Medications Marked as Taking for the 2/10/21 encounter (Office Visit) with PRANAV Meza CNP   Medication Sig Dispense Refill    ferrous sulfate (IRON 325) 325 (65 Fe) MG tablet Take 1 tablet by mouth daily (with breakfast) 90 tablet 1        Medications patient taking as of now reconciled against medications ordered at time of hospital discharge: Yes    Chief Complaint   Patient presents with    Post-Op Check     Colon Sx 02/04/2020 \"so far everything is okay\" pt wants to discuss pathology results        HPI    Inpatient course: Discharge summary reviewed- see chart.     Interval history/Current status: H/o colonic mass. Admitted on 2/4/21 at [facility] for partial right colectomy. Pt did very well and was discharged home 2/8/21. Discharge Diagnoses:   Principal Problem:    Malignant neoplasm of ascending colon ([address]  Active Problems:    S/P partial colectomy    Colonic mass  Resolved Problems:    * No resolved hospital problems. *    Denies any complaints. Has not needed pain medication. Taking the iron supplement as directed. Had 3 normal BM's yesterday, and 1 normal BM so far today. Denies any constipation or constipation. Denies abdominal pain, but does have some incisional tenderness. Denies s/s of infection. No fever, chills, N/V. She has a f/u appointment with Dr. Inocencio Spears on 2/19/21. CBC:        Recent Labs     02/06/21  0557 02/07/21  1157   WBC 9.9 6.0   HGB 7.1* 7.7*   HCT 23.1* 25.7*   * 586*      BMP:         Recent Labs     02/06/21  0556 02/07/21  1157    141   K 4.1 3.5    105   CO2 25 26   BUN 8 6*   CREATININE 0.6 <0.5*   GLUCOSE 112* 90            Review of Systems   Constitutional: Negative. Respiratory: Negative. Cardiovascular: Negative. Gastrointestinal: Negative for abdominal distention, abdominal pain, anal bleeding, blood in stool, constipation, diarrhea, nausea, rectal pain and vomiting. Genitourinary: Negative. Skin: Positive for wound. Negative for color change, pallor and rash. Lower midline abdominal surgical incision with dermabond. Healing well, no s/s of infection. Psychiatric/Behavioral: Negative. Vitals:    02/10/21 1117   BP: 120/68   Pulse: 67   Resp: 16   Temp: 97.3 °F (36.3 °C)   SpO2: 100%   Weight: 174 lb (78.9 kg)   Height: 5' 4\" (1.626 m)     Body mass index is 29.87 kg/m².    Wt Readings from Last 3 Encounters:   02/10/21 174 lb (78.9 kg)   02/06/21 174 lb (78.9 kg)   02/02/21 174 lb 6.4 oz (79.1 kg)     BP Readings from Last 3 Encounters:   02/10/21 120/68   02/08/21 (!) 163/81   02/04/21 137/65 Physical Exam  Vitals signs reviewed. Constitutional:       Appearance: Normal appearance. HENT:      Head: Normocephalic. Cardiovascular:      Rate and Rhythm: Normal rate and regular rhythm. Heart sounds: Normal heart sounds. Pulmonary:      Effort: Pulmonary effort is normal.      Breath sounds: Normal breath sounds. Abdominal:      General: Abdomen is flat. A surgical scar is present. Bowel sounds are normal.      Palpations: Abdomen is soft. Comments: Lower abdominal midline surgical incision. No s/s of infection. Skin:     General: Skin is warm and dry. Neurological:      Mental Status: She is alert. Psychiatric:         Mood and Affect: Mood normal.         Behavior: Behavior normal.         Thought Content: Thought content normal.         Judgment: Judgment normal.         Assessment/Plan:  1. Hospital discharge follow-up  Pt doing well. Has f/u appointment with Dr. Gillian Ramos 2/19/21. Will check CBC and BMP. - AK DISCHARGE MEDS RECONCILED W/ CURRENT OUTPATIENT MED LIST  - CBC Auto Differential  - Basic Metabolic Panel    2. Iron deficiency anemia, unspecified iron deficiency anemia type  H&H remained low during hospitalization. Did not receive PRBC's. H&H on 2/7/21 was 7.7 and 25.7. Will recheck today.     - CBC Auto Differential        Medical Decision Making: low complexity

## 2021-02-11 ENCOUNTER — TELEPHONE (OUTPATIENT)
Dept: SURGERY | Age: 66
End: 2021-02-11

## 2021-02-11 NOTE — TELEPHONE ENCOUNTER
Pt called for her pathology results - Sent Dr Duane Muscat a [facility] mess who said he would call her this afternoon

## 2021-02-11 NOTE — PROGRESS NOTES
Physician Progress Note      Lorena Wheeler  [facility] #:                  [id number]  :                       1955  ADMIT DATE:       2021 11:45 AM  DISCH DATE:        2021 12:07 PM  RESPONDING  PROVIDER #:        Eryn Momin MD          QUERY TEXT:    Pt admitted with Colon cancer and has \"Anemia - stable; had low H/H preop   likely due to occult blood loss related to tumor. \"  documented on  progress   note. If possible, please document in progress notes and discharge summary   further specificity regarding the acuity and type of anemia:    The medical record reflects the following:  Risk Factors: Anemia, cancer, Colectomy surgery  Clinical Indicators: on 21- H/H 7.3, 23.5 preop -> 6.7/22.2 post op, on   -> 6.9/22.4, on  ->7.1/23.1, on -> 7.7/25.7,  \"anemia - stable; had low H/H preop likely due to occult blood loss related to   tumor- will follow for now\" on  progress note  Treatment: Labs, colectomy surgery, pathology, ongoing monitoring and   supportive care    Thank you,  Kalina Greenberg RN, CDS  [phone]  Options provided:  -- Acute/Subacute blood loss anemia d/t colon cancer  -- Acute/Subacute blood loss anemia multifactorial d/t colon cancer and   surgery  -- Chronic blood loss anemia  -- Other - I will add my own diagnosis  -- Disagree - Not applicable / Not valid  -- Disagree - Clinically unable to determine / Unknown  -- Refer to Clinical Documentation Reviewer    PROVIDER RESPONSE TEXT:    This patient has chronic blood loss anemia.     Query created by: Nery Rodriguez on 2/10/2021 1:53 PM      Electronically signed by:  Eryn Momin MD 2021 7:09 AM

## 2021-02-12 ENCOUNTER — TELEPHONE (OUTPATIENT)
Dept: SURGERY | Age: 66
End: 2021-02-12

## 2021-02-14 PROBLEM — Z78.0 MENOPAUSE: Status: ACTIVE | Noted: 2021-02-14

## 2021-02-14 ASSESSMENT — ENCOUNTER SYMPTOMS
ABDOMINAL DISTENTION: 0
ABDOMINAL PAIN: 0
DIARRHEA: 0
CONSTIPATION: 0
NAUSEA: 0
VOMITING: 0
SHORTNESS OF BREATH: 0

## 2021-02-14 NOTE — PROGRESS NOTES
Breath sounds: Normal breath sounds. Chest:      Breasts: Breasts are symmetrical.         Right: No inverted nipple, mass, nipple discharge, skin change or tenderness. Left: No inverted nipple, mass, nipple discharge, skin change or tenderness. Abdominal:      General: Bowel sounds are normal. There is no distension. Palpations: Abdomen is soft. Abdomen is not rigid. There is no mass. Tenderness: There is no abdominal tenderness. There is no guarding or rebound. Genitourinary:     General: Normal vulva. Exam position: Lithotomy position. Labia:         Right: No rash, tenderness, lesion or injury. Left: No rash, tenderness, lesion or injury. Vagina: No signs of injury. No vaginal discharge, erythema, tenderness or bleeding. Cervix: No cervical motion tenderness, discharge or friability. Uterus: Not tender. Adnexa:         Right: No mass, tenderness or fullness. Left: No mass, tenderness or fullness. Rectum: Guaiac result negative. External hemorrhoid present. Musculoskeletal: Normal range of motion. Skin:     General: Skin is warm and dry. Findings: No erythema or rash. Nails: There is no clubbing. Neurological:      Mental Status: She is alert and oriented to person, place, and time. Psychiatric:         Speech: Speech normal.         Behavior: Behavior normal. Behavior is cooperative. Thought Content: Thought content normal.         Judgment: Judgment normal.         Assessment:       Diagnosis Orders   1. Women's annual routine gynecological examination  PAP SMEAR   2. Pap smear for cervical cancer screening  PAP SMEAR   3. Menopause     4. Obesity (BMI 30-39. 9)             Plan:      Orders Placed This Encounter   Procedures    PAP SMEAR    Human papillomavirus (HPV) DNA probe thin prep high risk    PAP SMEAR     Follow up prn and 1 year for well woman examination        Ibeth Huddleston DO

## 2021-02-15 ENCOUNTER — TELEPHONE (OUTPATIENT)
Dept: FAMILY MEDICINE CLINIC | Age: 66
End: 2021-02-15

## 2021-02-15 NOTE — TELEPHONE ENCOUNTER
----- Message from Amos Donnelly sent at 2/13/2021 10:21 AM EST -----  Subject: Message to Provider    QUESTIONS  Information for Provider? patient has a rash under both breast but   scheduling wanted me to get her in today witch is Saturday or tomorrow   riley is Sunday for this matter and we are not open. . please advise . Satrr Padilla not   trying to show me any other days   ---------------------------------------------------------------------------  --------------  CALL BACK INFO  What is the best way for the office to contact you? OK to leave message on   voicemail  Preferred Call Back Phone Number? [phone]  ---------------------------------------------------------------------------  --------------  SCRIPT ANSWERS  Relationship to Patient?  Self

## 2021-02-19 ENCOUNTER — OFFICE VISIT (OUTPATIENT)
Dept: SURGERY | Age: 66
End: 2021-02-19

## 2021-02-19 VITALS
SYSTOLIC BLOOD PRESSURE: 128 MMHG | HEIGHT: 64 IN | DIASTOLIC BLOOD PRESSURE: 77 MMHG | BODY MASS INDEX: 29.02 KG/M2 | HEART RATE: 89 BPM | TEMPERATURE: 97.4 F | WEIGHT: 170 LBS

## 2021-02-19 DIAGNOSIS — C18.2 MALIGNANT NEOPLASM OF ASCENDING COLON (HCC): ICD-10-CM

## 2021-02-19 DIAGNOSIS — Z09 POSTOP CHECK: Primary | ICD-10-CM

## 2021-02-19 PROCEDURE — 99024 POSTOP FOLLOW-UP VISIT: CPT | Performed by: SURGERY

## 2021-02-19 NOTE — PROGRESS NOTES
Surgery Post-op Progress Note    HPI:  Notes reviewed, and agree with documentation in pt's chart. Postoperative Follow-up: Patient presents for 2 week follow-up status post Open Right Colectomy . Eating a low fiber diet without difficulty. Bowel movements are Normal.  The patient is not having any pain. .     ROS:    · 10 point review of systems performed; please refer to HPI with pertinent positives, all other ROS are negative    PE:   CONSTITUTIONAL:  awake and alert    ABDOMEN: soft, non-distended, non-tender     INCISION: clean, dry, no drainage, good granulation tissue, healing      ASSESSMENT:   Diagnosis Orders   1. Postop check           PLAN:    · Patient is healing well  · May progress to regular diet   · Provided note for 2 more weeks off of work, patient is a  and her job involves some heavy lifting   · Discussed plan for establishing care with oncology, will provide referral       Surgery Staff    I have examined this patient and read and agree with the note by Debby Gaming DO from today. Will refer to Oncology, Dr Kika Ridley, for further evaluation regarding potential need for adjuvant therapy. If therapy is offered and a portacath is requested, we will proceed to schedule for her.     [facility]

## 2021-02-22 ENCOUNTER — TELEPHONE (OUTPATIENT)
Dept: SURGERY | Age: 66
End: 2021-02-22

## 2021-02-22 NOTE — TELEPHONE ENCOUNTER
Sammy from [facility] called - Dr Roselia Wyatt sent a referral for pt to see Dr Aaron Olson said Dr Latonya Rand is out of the office until 3/8 can pt see another physician - sent Dr Roselia Wyatt a Verla Nim back and informed that Dr Roselia Wyatt said pt could see Dr Heaven Gordon

## 2021-02-22 NOTE — TELEPHONE ENCOUNTER
Pt saw Dr Neli Wisdom in the office 2/19/21 and was given surgery instructions for a Surgical Port Placement [facility]) scheduled 3/10/21 @ 8:15am arrival 6:15am [facility] - NPO after midnight - Maricarmen Mcqueen CNP to complete pre-op physical - COVID test sched @ [facility] Pt understood and agreed to above noted

## 2021-02-26 PROBLEM — Z12.11 COLON CANCER SCREENING: Status: RESOLVED | Noted: 2021-01-27 | Resolved: 2021-02-26

## 2021-03-03 ENCOUNTER — TELEPHONE (OUTPATIENT)
Dept: SURGERY | Age: 66
End: 2021-03-03

## 2021-03-03 NOTE — TELEPHONE ENCOUNTER
Called Gilberto Garcia (Surgery Scheduling) and LM on  to cancel pts Port Placement surgery scheduled 3/10 w/ Dr Anisa Agee - [address] [facility] pt does not need chemo so no Port needed - Dr Anisa Agee informed yesterday during clinic

## 2021-03-25 ENCOUNTER — OFFICE VISIT (OUTPATIENT)
Dept: FAMILY MEDICINE CLINIC | Age: 66
End: 2021-03-25
Payer: COMMERCIAL

## 2021-03-25 VITALS
DIASTOLIC BLOOD PRESSURE: 70 MMHG | WEIGHT: 177.6 LBS | SYSTOLIC BLOOD PRESSURE: 140 MMHG | OXYGEN SATURATION: 95 % | HEART RATE: 60 BPM | TEMPERATURE: 97.4 F | BODY MASS INDEX: 30.47 KG/M2

## 2021-03-25 DIAGNOSIS — R73.09 ELEVATED HEMOGLOBIN A1C: Primary | ICD-10-CM

## 2021-03-25 LAB — HBA1C MFR BLD: 5.2 %

## 2021-03-25 PROCEDURE — 99213 OFFICE O/P EST LOW 20 MIN: CPT | Performed by: NURSE PRACTITIONER

## 2021-03-25 PROCEDURE — 83036 HEMOGLOBIN GLYCOSYLATED A1C: CPT | Performed by: NURSE PRACTITIONER

## 2021-03-25 ASSESSMENT — ENCOUNTER SYMPTOMS
RESPIRATORY NEGATIVE: 1
GASTROINTESTINAL NEGATIVE: 1

## 2021-03-25 NOTE — PROGRESS NOTES
3/25/2021     Pamela Shirley (:  1955) is a 60 y.o. female, here for evaluation of the following medical concerns:    HPI  Pt established care with me 2020 and her A1C was 6.8. She wanted to work on diet and exercise and recheck in 3 mths. She was diagnosed with colon CA (stage 2A) 2021, surgery to remove mass and bowel resection 2021. Did not need chemo or radiation. Following Dr. Mildred Finney with [facility]. She has been watching her sugar and carb intake. She has also been walking on her treadmill at home. Today her A1C was normal at 5.2. Pt plans to get a COVID vaccine. Review of Systems   Constitutional: Negative. Respiratory: Negative. Cardiovascular: Negative. Gastrointestinal: Negative. Endocrine: Negative. Prior to Visit Medications    Medication Sig Taking? Authorizing Provider   ferrous sulfate (IRON 325) 325 (65 Fe) MG tablet Take 1 tablet by mouth daily (with breakfast) Yes Elkin Coello APRN - CNP        Social History     Tobacco Use    Smoking status: Former Smoker     Packs/day: 2.00     Years: 30.00     Pack years: 60.00     Start date: 1972     Quit date: 2012     Years since quittin.2    Smokeless tobacco: Never Used   Substance Use Topics    Alcohol use: Yes     Frequency: Monthly or less     Comment: 4 x per year        Vitals:    21 0806 21 0828   BP: (!) 160/80 (!) 140/70   Site: Right Upper Arm Right Upper Arm   Position: Sitting Sitting   Cuff Size: Medium Adult Large Adult   Pulse: 60    Temp: 97.4 °F (36.3 °C)    SpO2: 95%    Weight: 177 lb 9.6 oz (80.6 kg)      Estimated body mass index is 30.47 kg/m² as calculated from the following:    Height as of 21: 5' 4.02\" (1.626 m). Weight as of this encounter: 177 lb 9.6 oz (80.6 kg). Physical Exam  Vitals signs reviewed. Constitutional:       Appearance: Normal appearance. HENT:      Head: Normocephalic.    Neck:      Musculoskeletal: Normal range of

## 2021-04-21 ENCOUNTER — TELEPHONE (OUTPATIENT)
Dept: GASTROENTEROLOGY | Age: 66
End: 2021-04-21

## 2021-04-21 NOTE — TELEPHONE ENCOUNTER
Spoke to patient and this is what is needed. Fax [phone] attn flaco    Send in office note to insurance where it states it is medically needed.

## 2021-04-21 NOTE — TELEPHONE ENCOUNTER
It was absolutely medically needed. She had colon cancer and CTs were ordered to rule out metastases. All documentation is in Epic.

## 2021-04-21 NOTE — TELEPHONE ENCOUNTER
Pt needs a retro auth for her ct of abd and pelvis so her insurance will pay for it. They will pay as long Dr. Karlo Bolden states it was medically necessary.

## 2021-07-29 DIAGNOSIS — D50.9 IRON DEFICIENCY ANEMIA, UNSPECIFIED IRON DEFICIENCY ANEMIA TYPE: ICD-10-CM

## 2021-07-29 RX ORDER — FERROUS SULFATE 325(65) MG
325 TABLET ORAL
Qty: 90 TABLET | Refills: 1 | Status: SHIPPED | OUTPATIENT
Start: 2021-07-29 | End: 2021-09-23

## 2021-07-29 NOTE — TELEPHONE ENCOUNTER
----- Message from [address] sent at 7/29/2021  1:11 PM EDT -----  Subject: Refill Request    QUESTIONS  Name of Medication? ferrous sulfate (IRON 325) 325 (65 Fe) MG tablet  Patient-reported dosage and instructions? 1 tab by mouth with breakfast  How many days do you have left? 6  Preferred Pharmacy? [address]  phone number (if available)? [phone]  Additional Information for Provider? pt stated she does not have access to   [facility] [employer] so please do not leave a message   ---------------------------------------------------------------------------  --------------  [address]  What is the best way for the office to contact you? Do not leave any   message, patient will call back for answer  Preferred Call Back Phone Number?  [phone]

## 2021-07-29 NOTE — TELEPHONE ENCOUNTER
Refill Request     Last Seen: Last Seen Department: 3/25/2021  Last Seen by PCP: 3/25/2021    Last Written: 1/12/2021    Next Appointment:   Future Appointments   Date Time Provider Elkin Dumas   11/9/2021  8:00 AM Wander Stefano, APRN - CNP [facility]       Future appointment scheduled      Requested Prescriptions     Pending Prescriptions Disp Refills    ferrous sulfate (IRON 325) 325 (65 Fe) MG tablet 90 tablet 1     Sig: Take 1 tablet by mouth daily (with breakfast)

## 2021-08-20 ENCOUNTER — TELEPHONE (OUTPATIENT)
Dept: FAMILY MEDICINE CLINIC | Age: 66
End: 2021-08-20

## 2021-08-20 ENCOUNTER — APPOINTMENT (OUTPATIENT)
Dept: GENERAL RADIOLOGY | Age: 66
End: 2021-08-20
Payer: COMMERCIAL

## 2021-08-20 ENCOUNTER — HOSPITAL ENCOUNTER (EMERGENCY)
Age: 66
Discharge: HOME | End: 2021-08-20
Attending: EMERGENCY MEDICINE
Payer: COMMERCIAL

## 2021-08-20 VITALS
BODY MASS INDEX: 32.37 KG/M2 | RESPIRATION RATE: 16 BRPM | DIASTOLIC BLOOD PRESSURE: 56 MMHG | HEIGHT: 64 IN | SYSTOLIC BLOOD PRESSURE: 165 MMHG | HEART RATE: 61 BPM | TEMPERATURE: 97.4 F | WEIGHT: 189.6 LBS | OXYGEN SATURATION: 98 %

## 2021-08-20 DIAGNOSIS — M16.11 OSTEOARTHRITIS OF RIGHT HIP, UNSPECIFIED OSTEOARTHRITIS TYPE: ICD-10-CM

## 2021-08-20 DIAGNOSIS — M25.551 RIGHT HIP PAIN: Primary | ICD-10-CM

## 2021-08-20 PROCEDURE — 99284 EMERGENCY DEPT VISIT MOD MDM: CPT

## 2021-08-20 PROCEDURE — 6370000000 HC RX 637 (ALT 250 FOR IP): Performed by: PHYSICIAN ASSISTANT

## 2021-08-20 PROCEDURE — 73502 X-RAY EXAM HIP UNI 2-3 VIEWS: CPT

## 2021-08-20 RX ORDER — HYDROCODONE BITARTRATE AND ACETAMINOPHEN 5; 325 MG/1; MG/1
1 TABLET ORAL EVERY 8 HOURS PRN
Qty: 9 TABLET | Refills: 0 | Status: SHIPPED | OUTPATIENT
Start: 2021-08-20 | End: 2021-08-23

## 2021-08-20 RX ORDER — MELOXICAM 7.5 MG/1
7.5 TABLET ORAL DAILY
Qty: 90 TABLET | Refills: 0 | Status: SHIPPED | OUTPATIENT
Start: 2021-08-20 | End: 2021-09-23

## 2021-08-20 RX ORDER — MELOXICAM 15 MG/1
7.5 TABLET ORAL DAILY
Status: COMPLETED | OUTPATIENT
Start: 2021-08-20 | End: 2021-08-20

## 2021-08-20 RX ADMIN — MELOXICAM 7.5 MG: 15 TABLET ORAL at 11:26

## 2021-08-20 ASSESSMENT — PAIN SCALES - GENERAL: PAINLEVEL_OUTOF10: 10

## 2021-08-20 NOTE — ED NOTES
Discharge instructions reviewed with patient and family member. Patient and family verbalized understanding. All home medications have been reviewed, questions answered and patient voiced understanding. Given prescriptions, discharge instructions, and appointment times.      Luisa Lane RN  08/20/21 [id number]

## 2021-08-20 NOTE — ED PROVIDER NOTES
[address] ED  EMERGENCY DEPARTMENT ENCOUNTER        Pt Name: Kassandra Munoz  MRN: [id number]  [place] 1955  Date of evaluation: 8/20/2021  Provider: KAREN Hart  PCP: Kathleen Pham, APRN - CNP    This patient was not seen and evaluated by the attending [phone]     I have evaluated this patient. My supervising physician was available for consultation. CHIEF COMPLAINT       Chief Complaint   Patient presents with    Leg Pain     right leg/hip pain since yesterday. Pt says it aches all the time but when she moves right she gets a sharp shooting pain. HISTORY OF PRESENT ILLNESS   (Location/Symptom, Timing/Onset, Context/Setting, Quality, Duration, Modifying Factors, Severity)  Note limiting factors. Kassandra Munoz is a 60 y.o. female who presents via private vehicle from her home with her daughter for  Right hip pain. ED Course as of Aug 20 1048   Fri Aug 20, 2021   1025 Pain to the right hip started yesterday she was cooking. The day before she did yardwork. She was not doing anything out of the ordinary. Yesterday she did try aspirin and it helped a little bit but she did not sleep very well last night bc of the pain. She notes this am it was even worse. Not better with position changes. She notes she also took some robaxin she had at home with no help. She notes moving the hip at all makes it worse. She did walk in to the ER but was offered a wheelchair. She denies any fevers. She notes this am the pain was so bad she felt nauseaated but did not get sick. She denies abdomina pain. She denies back pain. She notes the pain is constantly dull and ache but if she moves a certain way it is sharp. She cannot think of anything in particular that would have caused this but she was on staycation this week and was active with her home, doing extensive yard work. She denies distal numbness tinlging weakness.      [CS]      ED Course User Index  [CS] Luke Arita Smoking status: Former Smoker     Packs/day: 2.00     Years: 30.00     Pack years: 60.00     Start date: 1972     Quit date: 2012     Years since quittin.6    Smokeless tobacco: Never Used   Vaping Use    Vaping Use: Never used   Substance and Sexual Activity    Alcohol use: Yes     Comment: 4 x per year    Drug use: Never    Sexual activity: Not Currently   Other Topics Concern    None   Social History Narrative    None     Social Determinants of Health     Financial Resource Strain: Low Risk     Difficulty of Paying Living Expenses: Not very hard   Food Insecurity: No Food Insecurity    Worried About Running Out of Food in the Last Year: Never true    Brook of Food in the Last Year: Never true   Transportation Needs: No Transportation Needs    Lack of Transportation (Medical): No    Lack of Transportation (Non-Medical): No   Physical Activity: Sufficiently Active    Days of Exercise per Week: 5 days    Minutes of Exercise per Session: 30 min   Stress: Stress Concern Present    Feeling of Stress : To some extent   Social Connections:     Frequency of Communication with Friends and Family:     Frequency of Social Gatherings with Friends and Family:     Attends [employer] Services:     Active Member of Clubs or Organizations:     Attends Club or Organization Meetings:     Marital Status:    Intimate Partner Violence:     Fear of Current or Ex-Partner:     Emotionally Abused:     Physically Abused:     Sexually Abused:        SCREENINGS             PHYSICAL EXAM    (up to 7 for level 4, 8 or more for level 5)     ED Triage Vitals [21 0954]   BP Temp Temp src Pulse Resp SpO2 Height Weight   (!) 174/70 97.4 °F (36.3 °C) -- 71 18 99 % 5' 4\" (1.626 m) 189 lb 9.6 oz (86 kg)       Physical Exam  Vitals and nursing note reviewed. Constitutional:       General: She is awake. She is not in acute distress. Appearance: Normal appearance. She is well-developed.  She is not ill-appearing, toxic-appearing or diaphoretic. HENT:      Head: Normocephalic and atraumatic. Right Ear: External ear normal.      Left Ear: External ear normal.      Nose: Nose normal.      Mouth/Throat:      Mouth: Mucous membranes are moist.      Pharynx: Oropharynx is clear. Eyes:      General:         Right eye: No discharge. Left eye: No discharge. Extraocular Movements: Extraocular movements intact. Conjunctiva/sclera: Conjunctivae normal.      Pupils: Pupils are equal, round, and reactive to light. Cardiovascular:      Rate and Rhythm: Normal rate and regular rhythm. Pulses:           Radial pulses are 2+ on the right side and 2+ on the left side. Femoral pulses are 2+ on the right side. Posterior tibial pulses are 2+ on the right side and 2+ on the left side. Heart sounds: Normal heart sounds. No murmur heard. No friction rub. No gallop. Pulmonary:      Effort: Pulmonary effort is normal. No respiratory distress. Breath sounds: Normal breath sounds. No wheezing or rales. Musculoskeletal:      Cervical back: Normal, normal range of motion and neck supple. No rigidity. Thoracic back: Normal.      Lumbar back: Normal.      Right hip: Tenderness present. No deformity, lacerations, bony tenderness or crepitus. Normal range of motion. Decreased strength. Left hip: Normal.      Right upper leg: Normal.      Right knee: Normal.      Right lower leg: No edema. Left lower leg: No edema. Lymphadenopathy:      Cervical: No cervical adenopathy. Skin:     General: Skin is warm and dry. Capillary Refill: Capillary refill takes less than 2 seconds. Findings: No bruising or rash. Neurological:      General: No focal deficit present. Mental Status: She is alert, oriented to person, place, and time and easily aroused. Sensory: No sensory deficit. Motor: No weakness.    Psychiatric:         Behavior: Behavior normal. Behavior is cooperative. DIAGNOSTIC RESULTS   LABS:    Labs Reviewed - No data to display    All other labs were within normal range or not returned as of this dictation. EKG: All EKG's are interpreted by the Emergency Department Physician who either signs orCo-signs this chart in the absence of a cardiologist.  Please see their note for interpretation of EKG. RADIOLOGY:   Non-plain film images such as CT, Ultrasound and MRI are read by the radiologist. Plain radiographic images are visualized andpreliminarily interpreted by the  ED Provider with the below findings:        Interpretation [facility] Radiologist below, if available at the time of this note:    No orders to display     No results found. PROCEDURES   Unless otherwise noted below, none     Procedures    CRITICAL CARE TIME   N/A    CONSULTS:  None      EMERGENCY DEPARTMENT COURSE and DIFFERENTIALDIAGNOSIS/MDM:   Vitals:    Vitals:    08/20/21 0954   BP: (!) 174/70   Pulse: 71   Resp: 18   Temp: 97.4 °F (36.3 °C)   SpO2: 99%   Weight: 189 lb 9.6 oz (86 kg)   Height: 5' 4\" (1.626 m)       Patient was given thefollowing medications:  Medications - No data to display    PDMP Monitoring:    Last PDMP [place] as Reviewed [facility]):  Review User Review Instant Review Result            Urine Drug Screenings (1 yr)    No resulted procedures found. Medication Contract and Consent for Opioid Use Documents Filed      No documents found                MDM:   Patient seen and evaluated. Old records reviewed. Diagnostic testing reviewed and results discussed. I have independently evaluated this patient based upon my scope of practice. Supervising physician was in the department for consultation as needed. Patient is a 71-year-old female who presents for right hip pain. X-ray remarkable for mild osteoarthritis, x-ray interpreted by myself. No evidence of acute fracture or dislocation.   I believe she is suffering from acute osteoarthritis exacerbation likely from all of the activities that she was doing while she was at home which included yard work of 4 acres of land, refinishing and a pulse during antique chairs and taking care of her grandchildren. We will start her on Mobic, Sawyer for severe pain. Referral to orthopedics. She may be a great candidate for intra-articular injection. I have low concern overall for occult fracture low concern for septic arthritis low concern for greater \"greater trochanter bursitis low concern for sacroiliitis low concern for spinal epidural abscess or cauda equina. She was offered walker, crutches, cane in the emergency department and notes that she is all 3 at home and will use as needed. She has access to an ingrown pool, I encouraged that use. Based on patient's clinical history and clinical findings I currently estimate there is low probability for: compartment syndrome, DVT, septic arthritis, dislocation, surgically emergent fracture, tendon or NV injury. We have discussed the symptoms which are most concerning (e.g., changing or worsening pain, numbness, weakness) that necessitate immediate return. Pt is in agreement with the current plan and all questions were addressed. Discharge Time out:  CC Reviewed Yes   Test Results Yes     Vitals:    08/20/21 0954   BP: (!) 174/70   Pulse: 71   Resp: 18   Temp: 97.4 °F (36.3 °C)   SpO2: 99%              FINAL IMPRESSION      1. Right hip pain    2. Osteoarthritis of right hip, unspecified osteoarthritis type          DISPOSITION/PLAN   DISPOSITION        PATIENT REFERREDTO:  No follow-up provider specified.     DISCHARGE MEDICATIONS:  New Prescriptions    No medications on file       DISCONTINUED MEDICATIONS:  Discontinued Medications    No medications on file              (Please note that portions ofthis note were completed with a voice recognition program.  Efforts were made to edit the dictations but occasionally words are mis-transcribed.)    KAREN Correa (electronically signed)        KAREN Correa  08/20/21 [id number]

## 2021-08-20 NOTE — TELEPHONE ENCOUNTER
Patient states her right thigh is in pain, cannot put weight on it. ECC was not flagging as red/urgent, could not find appointment. I did not see anything, also I do not have the ability to override.      Please advise

## 2021-08-20 NOTE — TELEPHONE ENCOUNTER
Called pt at number provided, her daughter answered and stated that they were at [facility] ER b/c the pt could not handle the leg/hip pain. I informed her that I would keep an eye on her records and f/u with her as needed.

## 2021-08-26 ENCOUNTER — OFFICE VISIT (OUTPATIENT)
Dept: ORTHOPEDIC SURGERY | Age: 66
End: 2021-08-26
Payer: COMMERCIAL

## 2021-08-26 VITALS — BODY MASS INDEX: 32.1 KG/M2 | HEIGHT: 64 IN | WEIGHT: 188 LBS

## 2021-08-26 DIAGNOSIS — S76.019A STRAIN OF HIP ADDUCTOR MUSCLE, INITIAL ENCOUNTER: Primary | ICD-10-CM

## 2021-08-26 PROCEDURE — 99204 OFFICE O/P NEW MOD 45 MIN: CPT | Performed by: ORTHOPAEDIC SURGERY

## 2021-08-26 RX ORDER — METHYLPREDNISOLONE 4 MG/1
TABLET ORAL
Qty: 1 KIT | Refills: 0 | Status: SHIPPED | OUTPATIENT
Start: 2021-08-26 | End: 2021-09-01

## 2021-08-26 NOTE — TELEPHONE ENCOUNTER
L/m on patient's v/m noting that medication has been sent to pharmacy as of 3:52pm.  She was asked to call back with any further questions.

## 2021-08-26 NOTE — TELEPHONE ENCOUNTER
Prescription Status Check     Medication Name:  [address] [facility] Talya Lincoln: Lulu Rinaldi in [address] [place] [address]  Patient Contact Number:  [phone]    WOULD LIKE A CALL ONCE RX HAS BEEN SENT

## 2021-08-26 NOTE — PROGRESS NOTES
Date:  2021    Name:  Glendy King  Address:  [address] [place] [id number]    :  1955      Age:   60 y.o.    SSN:  [id number]      Medical Record Number:  [id number]    Reason for Visit:    Chief Complaint    New Patient (right hip pain/seen at [facility] on 21)      DOS:2021     HPI: Sarai Sharma is a 60 y.o. female here today for right anterior hip pain and groin pain for the past 2 weeks. She cannot recall a specific incident. She went to the emergency department given the severity of the pain. She wasplaying with her grandchildren about 9 days ago playing some softball. In the next couple days she began having difficulty getting up from a seated position and having a very difficult time sleeping at night or to find a comfortable position whether sitting standing or sleeping. She was given meloxicam 7.5mg at the hospital and she has had some mild improvement. But she continues to have 5 out of 10 pain. She hasis some radiating pain down from the quadriceps. No numbness or tingling    She works as a  at [employer]. No prior hip disorders. .   ROS: Review of systems reviewed from Patient History Form completed today and available in the patient's chart under the Media tab.        Past Medical History:   Diagnosis Date    Anemia     Cancer ([address]     colon    History of scarlet fever         Past Surgical History:   Procedure Laterality Date     SECTION      COLONOSCOPY N/A 2021    COLONOSCOPY WITH BIOPSY performed by Ayesha Ortega MD at [facility]  2021    COLONOSCOPY SUBMUCOSAL TATTOO INJECTION performed by Ayesha Ortega MD at [address] Right 2021    [facility]    EGD BIOPSY performed by Ayesha Ortega MD at [facility] ENDOSCOPY       Family History   Problem Relation Age of Onset    High Blood Pressure Mother     Diabetes Father     Cancer Brother        Social History     Socioeconomic History    Marital status:      Spouse name: None    Number of children: 2    Years of education: None    Highest education level: None   Occupational History    None   Tobacco Use    Smoking status: Former Smoker     Packs/day: 2.00     Years: 30.00     Pack years: 60.00     Start date: 1972     Quit date: 2012     Years since quittin.6    Smokeless tobacco: Never Used   Vaping Use    Vaping Use: Never used   Substance and Sexual Activity    Alcohol use: Yes     Comment: 4 x per year    Drug use: Never    Sexual activity: Not Currently   Other Topics Concern    None   Social History Narrative    None     Social Determinants of Health     Financial Resource Strain: Low Risk     Difficulty of Paying Living Expenses: Not very hard   Food Insecurity: No Food Insecurity    Worried About Running Out of Food in the Last Year: Never true    Brook of Food in the Last Year: Never true   Transportation Needs: No Transportation Needs    Lack of Transportation (Medical): No    Lack of Transportation (Non-Medical): No   Physical Activity: Sufficiently Active    Days of Exercise per Week: 5 days    Minutes of Exercise per Session: 30 min   Stress: Stress Concern Present    Feeling of Stress :  To some extent   Social Connections:     Frequency of Communication with Friends and Family:     Frequency of Social Gatherings with Friends and Family:     Attends Sabianism Services:     Active Member of Clubs or Organizations:     Attends Club or Organization Meetings:     Marital Status:    Intimate Partner Violence:     Fear of Current or Ex-Partner:     Emotionally Abused:     Physically Abused:     Sexually Abused:        Current Outpatient Medications   Medication Sig Dispense Refill    methylPREDNISolone (MEDROL, SU,) 4 MG tablet Take by mouth. 1 kit 0    meloxicam (MOBIC) 7.5 MG tablet Take 1 tablet by mouth daily 90 tablet 0    ferrous sulfate (IRON 325) 325 (65 Fe) MG tablet Take 1 tablet by mouth daily (with breakfast) 90 tablet 1     No current facility-administered medications for this visit. Allergies   Allergen Reactions    Novocain [Procaine] Rash       Vital signs:  Ht 5' 4\" (1.626 m)   Wt 188 lb (85.3 kg)   LMP  (LMP Unknown)   BMI 32.27 kg/m²        Constitutional: The physical examination finds the patient to be well-developed and well-nourished. The patient is alert and oriented x3 and was cooperative throughout the visit. Neuro: no focal deficits noted. Normal mood, judgement, decision making  Eyes: sclera clear, EOMI  Ears: Normal external ear  Mouth:  No perioral lesions  Pulm: Respirations unlabored and regular  Pulse: Extremities well perfused, warm, capillary refill < 2 seconds  Musculoskeletal:    Hip Examination: RIGHT     Skin/Inspection: no skin lesions, cellulitis, or extreme edema in the lower extremities. Standing/Walking: Normal non-antalgic gait, no pelvic tilt, Trendelenburg sign. No use of assistive devices    Sitting Exam: 5/5 Hip Flexor Strength, 5/5 Abductor Strength, 5/5 Adductor strength, Negative Straight Leg Raise. Tender at the adductor longus/pectineus    Supine Exam: Non tender around the ASIS, AIIS  Flexion arc 0 to 100deg, with no pain or difficulty. Special Tests: neg Deep Flexion Test, neg FADIR ,  Mild groin pain with OMA, neg cystoscopy and resisted sit-up (Athletic Pubalgia). Side Lying Exam: Non-tender at greater trochanter, abductor musculature, nor TFL origin. Abductor side leg raise 5/5 strength. Positive  OberTest    Prone Exam: Negative hip flexion contracture, internal rotation to 25 deg, external rotation to 45 deg.  Non-tender at the ischial tuberosity nor proximal hamstring      Diagnostics:  Radiology:       Pertinent imaging reviewed, images only - no report available. Radiographs were obtained and reviewed in the office; 3 views: AP Pelvis and right hip 45-deg Lion View, and right False Profile View    Tonnis ndGndrndanddndend:nd nd2nd LCEA: WNL  Alpha Angle: WNL   Cross over-sign is negative  Other: Negative Coxa Profunda, Negative Protrusio    Impression: No acute findings mild OA      Assessment: Patient is a 60 y.o. female 2 weeks right hip pain related to labral strain and a grade 1 to 2 adductor muscle sprain. No acute findings on the x-ray    Impression:  Visit Diagnoses       Codes    Strain of hip adductor muscle, initial encounter    -  Primary G19.327X          Office Procedures:  Orders Placed This Encounter   Medications    methylPREDNISolone (MEDROL, SU,) 4 MG tablet     Sig: Take by mouth. Dispense:  1 kit     Refill:  0     No orders of the defined types were placed in this encounter. Plan:  Pertinent imaging was reviewed. The etiology, natural history, and treatment options for the disorder were discussed. The roles of activity medication, antiinflammatories, injections, bracing, physical therapy, and surgical interventions were all described to the patient and questions were answered. We believe patient is a candidate for a Medrol Dosepak and watchful waiting and activity modification. She can then resume her meloxicam and take it twice daily if she has residual symptoms. I will see her back in 3 weeks for reassessment. All the patient's questions were answered while in the clinic. The patient is understanding of all instructions and agrees with the plan. Approximately 45 minutes was spent on patient education and coordinating care. Follow up in: Return in about 3 weeks (around 9/16/2021).     Hip Self assessment forms      Sincerely,    Filippo Gutierrez MD [facility] [facility] [address], [place]  Email: [email or web address]  Office: [phone]    08/26/21  5:14 PM    This dictation was performed with a verbal recognition program (DRAGON) and it was checked for errors. It is possible that there are still dictated errors within this office note. If so, please bring any errors to my attention for an addendum. All efforts were made to ensure that this office note is accurate.

## 2021-08-26 NOTE — LETTER
[address] [place] [address] 66 [id number]  Phone: [phone]  Fax: [phone]    Cherrie Gutiérrez MD    August 26, 2021     PRANAV Ceja - ANGELA Torresso [address] [facility] [address]    Patient: Alli Graham   MR Number: [id number]   YOB: 1955   Date of Visit: 8/26/2021       Dear Simone Parker:    Thank you for referring Mirza Guevara to me for evaluation/treatment. Below are the relevant portions of my assessment and plan of care. If you have questions, please do not hesitate to call me. I look forward to following [facility] along with you.     Sincerely,        Cherrie Gutiérrez MD

## 2021-08-26 NOTE — LETTER
[address] [place] [id number]  Phone: [phone]  Fax: [phone]           Tony Foster MD      August 26, 2021     Patient: Cipriano Franco   MR Number: [id number]   YOB: 1955   Date of Visit: 8/26/2021       Dear Dr. Selam Alexandra: Thank you for referring Eugenie Gamino to me for evaluation/treatment. Below are the relevant portions of my assessment and plan of care. If you have questions, please do not hesitate to call me. I look forward to following Jossy Quiles along with you.     Sincerely,        Tony Foster MD    CC providers:  [place],   [address] [facility] [address]  [address] [place]

## 2021-09-09 ENCOUNTER — TELEPHONE (OUTPATIENT)
Dept: ORTHOPEDIC SURGERY | Age: 66
End: 2021-09-09

## 2021-09-23 ENCOUNTER — OFFICE VISIT (OUTPATIENT)
Dept: ORTHOPEDIC SURGERY | Age: 66
End: 2021-09-23
Payer: COMMERCIAL

## 2021-09-23 VITALS — BODY MASS INDEX: 32.1 KG/M2 | HEIGHT: 64 IN | WEIGHT: 188 LBS

## 2021-09-23 DIAGNOSIS — S76.011D STRAIN OF RIGHT HIP ADDUCTOR MUSCLE, SUBSEQUENT ENCOUNTER: Primary | ICD-10-CM

## 2021-09-23 PROCEDURE — 99213 OFFICE O/P EST LOW 20 MIN: CPT | Performed by: ORTHOPAEDIC SURGERY

## 2021-09-23 NOTE — PROGRESS NOTES
Date:  2021    Name:  Smita Wallace  Address:  [address] [place] [id number]    :  1955      Age:   60 y.o.    SSN:  [id number]      Medical Record Number:  [id number]    Reason for Visit:    Chief Complaint    Follow-up (f/u right hip)      DOS:2021     HPI: Rosalina Narayanan is a 60 y.o. female here today for reassessment of a sudden groin pull anterior hip pain that started almost 4 weeks ago. She was playing with her grandson. We gave her a Medrol Dosepak. 2 days into treatment she felt significant relief of her anterior hip pain. She thereafter had some mid thigh and anterior knee pain but then also that has resolved. Since then she denies any stiffness pain or any limitations, or any giving way or any mechanical symptoms to her right hip. No difficulty sleeping. She works as a  at [employer]. No prior hip disorders. .   ROS: Review of systems reviewed from Patient History Form completed today and available in the patient's chart under the Media tab. Past Medical History:   Diagnosis Date    Anemia     Cancer ([address]     colon    History of scarlet fever         Past Surgical History:   Procedure Laterality Date     SECTION      COLONOSCOPY N/A 2021    COLONOSCOPY WITH BIOPSY performed by Madhavi Aguiar MD at [address]  2021    COLONOSCOPY SUBMUCOSAL TATTOO INJECTION performed by Madhavi Aguiar MD at [facility] Right 2021    OPEN RIGHT COLECTOMY performed by Jennine Leventhal, MD at [place] [address] N/A 2021    EGD BIOPSY performed by Madhavi Aguiar MD at [facility]       Family History   Problem Relation Age of Onset    High Blood Pressure Mother     Diabetes Father     Cancer Brother        Social History     Socioeconomic History    Marital status:       Spouse name: None    Number of children: 2    Years of education: None    Highest education level: None   Occupational History    None   Tobacco Use    Smoking status: Former Smoker     Packs/day: 2.00     Years: 30.00     Pack years: 60.00     Start date: 1972     Quit date: 2012     Years since quittin.7    Smokeless tobacco: Never Used   Vaping Use    Vaping Use: Never used   Substance and Sexual Activity    Alcohol use: Yes     Comment: 4 x per year    Drug use: Never    Sexual activity: Not Currently   Other Topics Concern    None   Social History Narrative    None     Social Determinants of Health     Financial Resource Strain: Low Risk     Difficulty of Paying Living Expenses: Not very hard   Food Insecurity: No Food Insecurity    Worried About Running Out of Food in the Last Year: Never true    Brook of Food in the Last Year: Never true   Transportation Needs: No Transportation Needs    Lack of Transportation (Medical): No    Lack of Transportation (Non-Medical): No   Physical Activity: Sufficiently Active    Days of Exercise per Week: 5 days    Minutes of Exercise per Session: 30 min   Stress: Stress Concern Present    Feeling of Stress : To some extent   Social Connections:     Frequency of Communication with Friends and Family:     Frequency of Social Gatherings with Friends and Family:     Attends Taoist Services:     Active Member of Clubs or Organizations:     Attends Club or Organization Meetings:     Marital Status:    Intimate Partner Violence:     Fear of Current or Ex-Partner:     Emotionally Abused:     Physically Abused:     Sexually Abused:        No current outpatient medications on file. No current facility-administered medications for this visit.        Allergies   Allergen Reactions    Novocain [Procaine] Rash       Vital signs:  Ht 5' 4\" (1.626 m)   Wt 188 lb (85.3 kg)   LMP  (LMP Unknown)   BMI 32.27 kg/m²        Constitutional: The physical examination finds the patient to be well-developed and well-nourished. The patient is alert and oriented x3 and was cooperative throughout the visit. Neuro: no focal deficits noted. Normal mood, judgement, decision making  Eyes: sclera clear, EOMI  Ears: Normal external ear  Mouth:  No perioral lesions  Pulm: Respirations unlabored and regular  Pulse: Extremities well perfused, warm, capillary refill < 2 seconds  Musculoskeletal:    Hip Examination: RIGHT     Skin/Inspection: no skin lesions, cellulitis, or extreme edema in the lower extremities. Standing/Walking: Normal non-antalgic gait, no pelvic tilt, Trendelenburg sign. No use of assistive devices    Sitting Exam: 5/5 Hip Flexor Strength, 5/5 Abductor Strength, 5/5 Adductor strength, Negative Straight Leg Raise. Non Tender at the adductor longus/pectineus    Supine Exam: Non tender around the ASIS, AIIS  Flexion arc 0 to 100deg, with no pain or difficulty. Special Tests: neg Deep Flexion Test, neg FADIR ,  No groin pain with OMA, neg resisted sit-up (Athletic Pubalgia). Side Lying Exam: Non-tender at greater trochanter, abductor musculature, nor TFL origin. Abductor side leg raise 5/5 strength. Positive  OberTest    Prone Exam: Negative hip flexion contracture, internal rotation to 25 deg, external rotation to 45 deg. Non-tender at the ischial tuberosity nor proximal hamstring      Diagnostics:  Radiology:       Prior Pertinent imaging reviewed, images only - no report available. Radiographs were obtained and reviewed in the office; 3 views: AP Pelvis and right hip 45-deg Lion View, and right False Profile View    Tonnis ndGndrndanddndend:nd nd2nd LCEA: WNL  Alpha Angle: WNL   Cross over-sign is negative  Other: Negative Coxa Profunda, Negative Protrusio    Impression: No acute findings mild OA      Assessment: Patient is a 60 y.o. female 4  weeks right hip pain related to a resolving grade 1 to 2 adductor muscle sprain.     Impression:  Visit Diagnoses       Codes Strain of right hip adductor muscle, subsequent encounter    -  Primary S76.011D          Office Procedures:  No orders of the defined types were placed in this encounter. No orders of the defined types were placed in this encounter. Plan:  BODØ has recovered extremely well. She has clinically healed her adductor and hip flexor strain symptoms with time, rest, activity modification, and a Medrol Dosepak. At this point we will only need to see her back as needed for any worsening or recurrent symptoms. All the patient's questions were answered while in the clinic. The patient is understanding of all instructions and agrees with the plan. Approximately 25 minutes was spent on patient education and coordinating care. Follow up in: Return if symptoms worsen or fail to improve. Hip Self assessment forms      Sincerely,    Sveta Thakkar MD [facility]   [address] [place], [address]  Email: [email or web address]  Office: [phone]    09/23/21  5:27 PM    This dictation was performed with a verbal recognition program (DRAGON) and it was checked for errors. It is possible that there are still dictated errors within this office note. If so, please bring any errors to my attention for an addendum. All efforts were made to ensure that this office note is accurate.

## 2021-11-09 ENCOUNTER — HOSPITAL ENCOUNTER (OUTPATIENT)
Dept: GENERAL RADIOLOGY | Age: 66
Discharge: HOME | End: 2021-11-09
Payer: MEDICARE

## 2021-11-09 ENCOUNTER — HOSPITAL ENCOUNTER (OUTPATIENT)
Age: 66
Discharge: HOME | End: 2021-11-09
Payer: MEDICARE

## 2021-11-09 ENCOUNTER — OFFICE VISIT (OUTPATIENT)
Dept: FAMILY MEDICINE CLINIC | Age: 66
End: 2021-11-09
Payer: MEDICARE

## 2021-11-09 VITALS
OXYGEN SATURATION: 96 % | BODY MASS INDEX: 33.75 KG/M2 | SYSTOLIC BLOOD PRESSURE: 128 MMHG | HEART RATE: 77 BPM | DIASTOLIC BLOOD PRESSURE: 62 MMHG | WEIGHT: 196.6 LBS

## 2021-11-09 DIAGNOSIS — Z23 NEED FOR VACCINATION WITH 13-POLYVALENT PNEUMOCOCCAL CONJUGATE VACCINE: ICD-10-CM

## 2021-11-09 DIAGNOSIS — Z78.0 POST-MENOPAUSAL: ICD-10-CM

## 2021-11-09 DIAGNOSIS — Z00.00 ROUTINE GENERAL MEDICAL EXAMINATION AT A HEALTH CARE FACILITY: Primary | ICD-10-CM

## 2021-11-09 DIAGNOSIS — M79.645 PAIN OF LEFT THUMB: ICD-10-CM

## 2021-11-09 DIAGNOSIS — Z23 NEED FOR INFLUENZA VACCINATION: ICD-10-CM

## 2021-11-09 LAB
A/G RATIO: 1.9 (ref 1.1–2.2)
ALBUMIN SERPL-MCNC: 4.4 G/DL (ref 3.4–5)
ALP BLD-CCNC: 61 U/L (ref 40–129)
ALT SERPL-CCNC: 16 U/L (ref 10–40)
ANION GAP SERPL CALCULATED.3IONS-SCNC: 10 MMOL/L (ref 3–16)
AST SERPL-CCNC: 20 U/L (ref 15–37)
BASOPHILS ABSOLUTE: 0 K/UL (ref 0–0.2)
BASOPHILS RELATIVE PERCENT: 0.4 %
BILIRUB SERPL-MCNC: 0.5 MG/DL (ref 0–1)
BUN BLDV-MCNC: 12 MG/DL (ref 7–20)
CALCIUM SERPL-MCNC: 9.7 MG/DL (ref 8.3–10.6)
CHLORIDE BLD-SCNC: 105 MMOL/L (ref 99–110)
CO2: 28 MMOL/L (ref 21–32)
CREAT SERPL-MCNC: 0.7 MG/DL (ref 0.6–1.2)
EOSINOPHILS ABSOLUTE: 0 K/UL (ref 0–0.6)
EOSINOPHILS RELATIVE PERCENT: 0.9 %
GFR AFRICAN AMERICAN: >60
GFR NON-AFRICAN AMERICAN: >60
GLUCOSE BLD-MCNC: 104 MG/DL (ref 70–99)
HCT VFR BLD CALC: 42.4 % (ref 36–48)
HEMOGLOBIN: 13.8 G/DL (ref 12–16)
LYMPHOCYTES ABSOLUTE: 1.3 K/UL (ref 1–5.1)
LYMPHOCYTES RELATIVE PERCENT: 24.2 %
MCH RBC QN AUTO: 30.7 PG (ref 26–34)
MCHC RBC AUTO-ENTMCNC: 32.5 G/DL (ref 31–36)
MCV RBC AUTO: 94.2 FL (ref 80–100)
MONOCYTES ABSOLUTE: 0.3 K/UL (ref 0–1.3)
MONOCYTES RELATIVE PERCENT: 6.1 %
NEUTROPHILS ABSOLUTE: 3.7 K/UL (ref 1.7–7.7)
NEUTROPHILS RELATIVE PERCENT: 68.4 %
PDW BLD-RTO: 12.3 % (ref 12.4–15.4)
PLATELET # BLD: 199 K/UL (ref 135–450)
PMV BLD AUTO: 9.6 FL (ref 5–10.5)
POTASSIUM REFLEX MAGNESIUM: 5.2 MMOL/L (ref 3.5–5.1)
RBC # BLD: 4.5 M/UL (ref 4–5.2)
SODIUM BLD-SCNC: 143 MMOL/L (ref 136–145)
T4 FREE: 1.1 NG/DL (ref 0.9–1.8)
TOTAL PROTEIN: 6.7 G/DL (ref 6.4–8.2)
TSH SERPL DL<=0.05 MIU/L-ACNC: 1.64 UIU/ML (ref 0.27–4.2)
WBC # BLD: 5.3 K/UL (ref 4–11)

## 2021-11-09 PROCEDURE — 73140 X-RAY EXAM OF FINGER(S): CPT

## 2021-11-09 PROCEDURE — 4040F PNEUMOC VAC/ADMIN/RCVD: CPT | Performed by: NURSE PRACTITIONER

## 2021-11-09 PROCEDURE — 90694 VACC AIIV4 NO PRSRV 0.5ML IM: CPT | Performed by: NURSE PRACTITIONER

## 2021-11-09 PROCEDURE — G0402 INITIAL PREVENTIVE EXAM: HCPCS | Performed by: NURSE PRACTITIONER

## 2021-11-09 PROCEDURE — 90670 PCV13 VACCINE IM: CPT | Performed by: NURSE PRACTITIONER

## 2021-11-09 PROCEDURE — G0008 ADMIN INFLUENZA VIRUS VAC: HCPCS | Performed by: NURSE PRACTITIONER

## 2021-11-09 PROCEDURE — 3017F COLORECTAL CA SCREEN DOC REV: CPT | Performed by: NURSE PRACTITIONER

## 2021-11-09 PROCEDURE — G0009 ADMIN PNEUMOCOCCAL VACCINE: HCPCS | Performed by: NURSE PRACTITIONER

## 2021-11-09 PROCEDURE — 1123F ACP DISCUSS/DSCN MKR DOCD: CPT | Performed by: NURSE PRACTITIONER

## 2021-11-09 ASSESSMENT — LIFESTYLE VARIABLES
HOW OFTEN DURING THE LAST YEAR HAVE YOU FOUND THAT YOU WERE NOT ABLE TO STOP DRINKING ONCE YOU HAD STARTED: NEVER
HOW OFTEN DURING THE LAST YEAR HAVE YOU FAILED TO DO WHAT WAS NORMALLY EXPECTED FROM YOU BECAUSE OF DRINKING: 0
HOW OFTEN DURING THE LAST YEAR HAVE YOU FAILED TO DO WHAT WAS NORMALLY EXPECTED FROM YOU BECAUSE OF DRINKING: NEVER
AUDIT TOTAL SCORE: 1
HAVE YOU OR SOMEONE ELSE BEEN INJURED AS A RESULT OF YOUR DRINKING: NO
HOW OFTEN DO YOU HAVE A DRINK CONTAINING ALCOHOL: 1
AUDIT-C TOTAL SCORE: 0
HOW MANY STANDARD DRINKS CONTAINING ALCOHOL DO YOU HAVE ON A TYPICAL DAY: ONE OR TWO
HAS A RELATIVE, FRIEND, DOCTOR, OR ANOTHER HEALTH PROFESSIONAL EXPRESSED CONCERN ABOUT YOUR DRINKING OR SUGGESTED YOU CUT DOWN: 0
HOW OFTEN DURING THE LAST YEAR HAVE YOU NEEDED AN ALCOHOLIC DRINK FIRST THING IN THE MORNING TO GET YOURSELF GOING AFTER A NIGHT OF HEAVY DRINKING: NEVER
HAVE YOU OR SOMEONE ELSE BEEN INJURED AS A RESULT OF YOUR DRINKING: 0
HOW OFTEN DO YOU HAVE SIX OR MORE DRINKS ON ONE OCCASION: 0
HOW OFTEN DURING THE LAST YEAR HAVE YOU HAD A FEELING OF GUILT OR REMORSE AFTER DRINKING: 0
HOW OFTEN DURING THE LAST YEAR HAVE YOU FOUND THAT YOU WERE NOT ABLE TO STOP DRINKING ONCE YOU HAD STARTED: 0
HOW MANY STANDARD DRINKS CONTAINING ALCOHOL DO YOU HAVE ON A TYPICAL DAY: 0
AUDIT-C TOTAL SCORE: 1
AUDIT TOTAL SCORE: 0
HOW OFTEN DURING THE LAST YEAR HAVE YOU BEEN UNABLE TO REMEMBER WHAT HAPPENED THE NIGHT BEFORE BECAUSE YOU HAD BEEN DRINKING: 0
HOW OFTEN DO YOU HAVE A DRINK CONTAINING ALCOHOL: MONTHLY OR LESS
HAS A RELATIVE, FRIEND, DOCTOR, OR ANOTHER HEALTH PROFESSIONAL EXPRESSED CONCERN ABOUT YOUR DRINKING OR SUGGESTED YOU CUT DOWN: NO
HOW OFTEN DURING THE LAST YEAR HAVE YOU HAD A FEELING OF GUILT OR REMORSE AFTER DRINKING: NEVER
HOW OFTEN DURING THE LAST YEAR HAVE YOU BEEN UNABLE TO REMEMBER WHAT HAPPENED THE NIGHT BEFORE BECAUSE YOU HAD BEEN DRINKING: NEVER
HOW OFTEN DURING THE LAST YEAR HAVE YOU NEEDED AN ALCOHOLIC DRINK FIRST THING IN THE MORNING TO GET YOURSELF GOING AFTER A NIGHT OF HEAVY DRINKING: 0
HOW OFTEN DO YOU HAVE SIX OR MORE DRINKS ON ONE OCCASION: NEVER

## 2021-11-09 ASSESSMENT — PATIENT HEALTH QUESTIONNAIRE - PHQ9
1. LITTLE INTEREST OR PLEASURE IN DOING THINGS: 0
SUM OF ALL RESPONSES TO PHQ QUESTIONS 1-9: 0
2. FEELING DOWN, DEPRESSED OR HOPELESS: 0
SUM OF ALL RESPONSES TO PHQ9 QUESTIONS 1 & 2: 0
SUM OF ALL RESPONSES TO PHQ QUESTIONS 1-9: 0
SUM OF ALL RESPONSES TO PHQ QUESTIONS 1-9: 0

## 2021-11-09 NOTE — PROGRESS NOTES
Medicare Annual Wellness Visit  Name: Samantha Nelson Date: 2021   MRN: <[id number]> Sex: Female   Age: 73 y.o. Ethnicity: Non- / Non    : 1955 Race: White (non-)      Anthony De La Cruz is here for Medicare AWV, Other (left thumb bends and gets \"stuck\",  1 month), and Other (biometric screening was done 21)    Screenings for behavioral, psychosocial and functional/safety risks, and cognitive dysfunction are all negative except as indicated below. These results, as well as other patient data from the [address] form, are documented in Flowsheets linked to this Encounter.     Allergies   Allergen Reactions    Novocain [Procaine] Rash         Prior to Visit Medications    Not on File         Past Medical History:   Diagnosis Date    Anemia     Cancer ([address]     colon    History of scarlet fever        Past Surgical History:   Procedure Laterality Date     SECTION      COLONOSCOPY N/A 2021    COLONOSCOPY WITH BIOPSY performed by Aure Herring MD at [address]  2021    COLONOSCOPY SUBMUCOSAL TATTOO INJECTION performed by Aure Herring MD at [address] Right 2021    OPEN RIGHT COLECTOMY performed by Ria Srinivasan MD at [place] [address] N/A 2021    EGD BIOPSY performed by Aure Herring MD at [facility]         Family History   Problem Relation Age of Onset    High Blood Pressure Mother     Diabetes Father     Cancer Brother        CareTeam (Including outside providers/suppliers regularly involved in providing care):   Patient Care Team:  PRANAV Acuña CNP as PCP - General (Nurse Practitioner)  PRANAV Acuña CNP as PCP - [facility] Empaneled Provider    Wt Readings from Last 3 Encounters:   21 196 lb 9.6 oz (89.2 kg)   21 188 lb (85.3 kg)   21 188 lb (85.3 kg)     Vitals:    11/09/21 0803   BP: 128/62   Site: Right Upper Arm   Position: Sitting   Cuff Size: Large Adult   Pulse: 77   SpO2: 96%   Weight: 196 lb 9.6 oz (89.2 kg)     Body mass index is 33.75 kg/m². Based upon direct observation of the patient, evaluation of cognition reveals recent and remote memory intact. General Appearance: alert and oriented to person, place and time, well-developed and well-nourished, in no acute distress  Skin: warm and dry, no rash or erythema  Pulmonary/Chest: clear to auscultation bilaterally- no wheezes, rales or rhonchi, normal air movement, no respiratory distress  Cardiovascular: normal rate, normal S1 and S2, no gallops, intact distal pulses and no carotid bruits  Musculoskeletal: joint tenderness present  left thumb pain and swelling. Neurologic: gait and coordination normal and speech normal    Patient's complete Health Risk Assessment and screening values have been reviewed and are found in Flowsheets. The following problems were reviewed today and where indicated follow up appointments were made and/or referrals ordered. Positive Risk Factor Screenings with Interventions:            General Health and ACP:  General  In general, how would you say your health is?: Excellent  In the past 7 days, have you experienced any of the following? New or Increased Pain, New or Increased Fatigue, Loneliness, Social Isolation, Stress or Anger?: None of These  Do you get the social and emotional support that you need?: Yes  Do you have a Living Will?: (!) No  Advance Directives     Power of [address] [place] [address] Will ACP-Advance Directive ACP-Power of     Not on File Not on File Not on File Not on File      General Health Risk Interventions:  · No Living Will: Patient declines ACP discussion/assistance and pt states that she has all of the paperwork at home.     Health Habits/Nutrition:  Health Habits/Nutrition  Do you exercise for at least 20 minutes 2-3 times per week?: Yes  Have you lost any weight without trying in the past 3 months?: No  Do you eat only one meal per day?: No  Have you seen the dentist within the past year?: Yes     Health Habits/Nutrition Interventions:  · n/a       Personalized Preventive Plan   Current Health Maintenance Status  Immunization History   Administered Date(s) Administered    COVID-19, Pedro Arellano PF, 30mcg/0.3mL 03/31/2021, 04/21/2021    Hepatitis A Adult (Havrix, Vaqta) 04/29/2019    Influenza Virus Vaccine 10/23/2019, 09/22/2020, 10/25/2020    Influenza, Quadv, Recombinant, IM PF (Flublok 18 yrs and older) 10/23/2019, 09/22/2020    Pneumococcal Polysaccharide ([id number]) 10/28/2016    Tdap (Boostrix, Adacel) 10/23/2019    Zoster Recombinant (Shingrix) 09/22/2020, 12/03/2020        Health Maintenance   Topic Date Due    DEXA (modify frequency per FRAX score)  Never done    Flu vaccine (1) 09/01/2021    COVID-19 Vaccine (3 - Booster for Pfizer series) 10/21/2021    Pneumococcal 65+ years Vaccine (1 of 1 - PPSV23) 10/28/2021    Breast cancer screen  01/07/2023    Diabetes screen  03/25/2024    Lipid screen  09/20/2025    Cervical cancer screen  01/26/2026    DTaP/Tdap/Td vaccine (2 - Td or Tdap) 10/23/2029    Colon cancer screen colonoscopy  01/27/2031    Shingles Vaccine  Completed    Hepatitis C screen  Completed    HIV screen  Completed    Hepatitis A vaccine  Aged Out    Hepatitis B vaccine  Aged Out    Hib vaccine  Aged Out    Meningococcal (ACWY) vaccine  Aged Out    Low dose CT lung screening  Discontinued     Recommendations for Preventive Services Due: see orders and patient instructions/AVS.  . Recommended screening schedule for the next 5-10 years is provided to the patient in written form: see Patient Instructions/AVS.    Pt had her biometric screening done at  [facility] 8/2021:  TC was 180, HDL was 80, LDL was 88, triglycerides were 67. A1C done 3/25/2021 - 5.2.       Pt retired from [employer] () 9/2021. Pt c/o left thumb pain for the past 6 weeks. No known injury. States that she does a lot of crafting (makes quilts, crochets). Sammi Dyer was seen today for medicare awv, other and other. Diagnoses and all orders for this visit:    Routine general medical examination at a health care facility  Encouraged healthy diet and exercise. Recommended routine dental and vision exams. Pt had a biometric screening at [place] 8/2021. Will check other routine labs. -     CBC Auto Differential  -     Comprehensive Metabolic Panel w/ Reflex to MG  -     T4, Free  -     TSH without Reflex  -     Hemoglobin A1C    Need for influenza vaccination  -     INFLUENZA, QUADV, ADJUVANTED, 65 YRS =, IM, PF, PREFILL SYR, 0.5ML (FLUAD)    Post-menopausal  -     DEXA BONE DENSITY AXIAL SKELETON; Future    Need for vaccination with 13-polyvalent pneumococcal conjugate vaccine  -     PREVNAR 13 IM (Pneumococcal conjugate vaccine 13-valent)    Pain of left thumb  Will order xray, pt may need to f/u with ortho. -     XR FINGER LEFT (MIN 2 VIEWS);  Future  -     Travis Junior MD, Orthopedic Surgery, [facility]

## 2021-11-09 NOTE — PROGRESS NOTES
2021 - 2022 Flu Vaccine Questionnaire    VIS given -  Yes    1. Have you received any other vaccine within the last 14 days? No  2. Do you currently have an active infectious or acute respiratory illness or fever? No  3. Are you taking steroids or immune suppressive drugs? No  4. Have you ever had a reaction to a flu vaccine? No  5. Are you allergic to eggs, egg products, chicken, Thimerosal (preservative) Gentamycin, polymixin, neomycin or Latex? No  6. Have you ever had Guillian Leming Syndrome?   No

## 2021-11-09 NOTE — PATIENT INSTRUCTIONS
Personalized Preventive Plan for Madelyn Ferro - 11/9/2021  Medicare offers a range of preventive health benefits. Some of the tests and screenings are paid in full while other may be subject to a deductible, co-insurance, and/or copay. Some of these benefits include a comprehensive review of your medical history including lifestyle, illnesses that may run in your family, and various assessments and screenings as appropriate. After reviewing your medical record and screening and assessments performed today your provider may have ordered immunizations, labs, imaging, and/or referrals for you. A list of these orders (if applicable) as well as your Preventive Care list are included within your After Visit Summary for your review. Other Preventive Recommendations:    · A preventive eye exam performed by an eye specialist is recommended every 1-2 years to screen for glaucoma; cataracts, macular degeneration, and other eye disorders. · A preventive dental visit is recommended every 6 months. · Try to get at least 150 minutes of exercise per week or 10,000 steps per day on a pedometer . · Order or download the FREE \"Exercise & Physical Activity: Your Everyday Guide\" from [employer]. Call 8-945.721.3376 or search [employer] online. · You need 7669-2130 mg of calcium and 3595-5586 IU of vitamin D per day. It is possible to meet your calcium requirement with diet alone, but a vitamin D supplement is usually necessary to meet this goal.  · When exposed to the sun, use a sunscreen that protects against both UVA and UVB radiation with an SPF of 30 or greater. Reapply every 2 to 3 hours or after sweating, drying off with a towel, or swimming. · Always wear a seat belt when traveling in a car. Always wear a helmet when riding a bicycle or motorcycle. Personalized Preventive Plan for Madelyn Ferro - 11/9/2021  Medicare offers a range of preventive health benefits. Some of the tests and screenings are paid in full while other may be subject to a deductible, co-insurance, and/or copay. Some of these benefits include a comprehensive review of your medical history including lifestyle, illnesses that may run in your family, and various assessments and screenings as appropriate. After reviewing your medical record and screening and assessments performed today your provider may have ordered immunizations, labs, imaging, and/or referrals for you. A list of these orders (if applicable) as well as your Preventive Care list are included within your After Visit Summary for your review. Other Preventive Recommendations:    A preventive eye exam performed by an eye specialist is recommended every 1-2 years to screen for glaucoma; cataracts, macular degeneration, and other eye disorders. A preventive dental visit is recommended every 6 months. Try to get at least 150 minutes of exercise per week or 10,000 steps per day on a pedometer . Order or download the FREE \"Exercise & Physical Activity: Your Everyday Guide\" from [employer]. Call 5-457.741.5750 or search [employer] online. You need 4340-5777 mg of calcium and 1794-8046 IU of vitamin D per day. It is possible to meet your calcium requirement with diet alone, but a vitamin D supplement is usually necessary to meet this goal.  When exposed to the sun, use a sunscreen that protects against both UVA and UVB radiation with an SPF of 30 or greater. Reapply every 2 to 3 hours or after sweating, drying off with a towel, or swimming. Always wear a seat belt when traveling in a car. Always wear a helmet when riding a bicycle or motorcycle. Patient Education        Influenza (Flu) Vaccine (Inactivated or Recombinant): What You Need to Know  Why get vaccinated? Influenza vaccine can prevent influenza (flu).   Flu is a contagious disease that spreads around the [place] every year, usually between October and May. Anyone can get the flu, but it is more dangerous for some people. Infants and young children, people 72years of age and older, pregnant women, and people with certain health conditions or a weakened immune system are at greatest risk of flu complications. Pneumonia, bronchitis, sinus infections and ear infections are examples of flu-related complications. If you have a medical condition, such as heart disease, cancer or diabetes, flu can make it worse. Flu can cause fever and chills, sore throat, muscle aches, fatigue, cough, headache, and runny or stuffy nose. Some people may have vomiting and diarrhea, though this is more common in children than adults. Each year, thousands of people in the [facility] die from flu, and many more are hospitalized. Flu vaccine prevents millions of illnesses and flu-related visits to the doctor each year. Influenza vaccine  CDC recommends everyone 10months of age and older get vaccinated every flu season. Children 6 months through 6years of age may need 2 doses during a single flu season. Everyone else needs only 1 dose each flu season. It takes about 2 weeks for protection to develop after vaccination. There are many flu viruses, and they are always changing. Each year a new flu vaccine is made to protect against three or four viruses that are likely to cause disease in the upcoming flu season. Even when the vaccine doesn't exactly match these viruses, it may still provide some protection. Influenza vaccine does not cause flu. Influenza vaccine may be given at the same time as other vaccines. Talk with your health care provider  Tell your vaccine provider if the person getting the vaccine:  Has had an allergic reaction after a previous dose of influenza vaccine, or has any severe, life-threatening allergies. Has ever had Guillain-Barré Syndrome (also called GBS).   In some cases, your health care provider may decide to postpone influenza vaccination to a future visit. People with minor illnesses, such as a cold, may be vaccinated. People who are moderately or severely ill should usually wait until they recover before getting influenza vaccine. Your health care provider can give you more information. Risks of a vaccine reaction  Soreness, redness, and swelling where shot is given, fever, muscle aches, and headache can happen after influenza vaccine. There may be a very small increased risk of Guillain-Barré Syndrome (GBS) after inactivated influenza vaccine (the flu shot). Ivory Scripture children who get the flu shot along with pneumococcal vaccine (PCV13), and/or DTaP vaccine at the same time might be slightly more likely to have a seizure caused by fever. Tell your health care provider if a child who is getting flu vaccine has ever had a seizure. People sometimes faint after medical procedures, including vaccination. Tell your provider if you feel dizzy or have vision changes or ringing in the ears. As with any medicine, there is a very remote chance of a vaccine causing a severe allergic reaction, other serious injury, or death. What if there is a serious problem? An allergic reaction could occur after the vaccinated person leaves the clinic. If you see signs of a severe allergic reaction (hives, swelling of the face and throat, difficulty breathing, a fast heartbeat, dizziness, or weakness), call 9-1-1 and get the person to the nearest hospital.  For other signs that concern you, call your health care provider. Adverse reactions should be reported to the Vaccine Adverse Event Reporting System (VAERS). Your health care provider will usually file this report, or you can do it yourself. Visit the VAERS website at www.vaers. hhs.gov or call 7-822.810.5283. VAERS is only for reporting reactions, and VAERS staff do not give medical advice.   The [place] Vaccine Injury Compensation Program  The [place] Vaccine Injury Compensation Program (VICP) is a federal program that was created to compensate people who may have been injured by certain vaccines. Visit the VICP website at www.hrsa.gov/vaccinecompensation or call 5-921.289.5013 to learn about the program and about filing a claim. There is a time limit to file a claim for compensation. How can I learn more? Ask your healthcare provider. Call your local or state health department. Contact the Centers for Disease Control and Prevention (CDC): Call 6-322.585.4995 (1-800-CDC-INFO) or  Visit CDC's website at www.cdc.gov/flu  Vaccine Information Statement (Interim)  Inactivated Influenza Vaccine  8/15/2019  42 FERNANDO Pike [id number]  Department of Health and Human Services  Centers for Disease Control and Prevention  Many Vaccine Information Statements are available in [place] and other languages. See www.immunize.org/vis. Muchas hojas de información sobre vacunas están disponibles en español y en otros idiomas. Visite www.immunize.org/vis. Care instructions adapted under license by [facility] ([facility]). If you have questions about a medical condition or this instruction, always ask your healthcare professional. [place] [address] any warranty or liability for your use of this information. Patient Education        Pneumococcal Conjugate Vaccine (PCV13): What You Need to Know  Why get vaccinated? Pneumococcal conjugate vaccine (PCV13) can prevent pneumococcal disease. Pneumococcal disease refers to any illness caused by pneumococcal bacteria. These bacteria can cause many types of illnesses, including pneumonia, which is an infection of the lungs. Pneumococcal bacteria are one of the most common causes of pneumonia.   Besides pneumonia, pneumococcal bacteria can also cause:  Ear infections  Sinus infections  Meningitis (infection of the tissue covering the brain and spinal cord)  Bacteremia (bloodstream infection)  Anyone can get pneumococcal disease, but children under 3years of age, people with certain medical conditions, adults 72 years or older, and cigarette smokers are at the highest risk. Most pneumococcal infections are mild. However, some can result in long-term problems, such as brain damage or hearing loss. Meningitis, bacteremia, and pneumonia caused by pneumococcal disease can be fatal.  PCV13  PCV13 protects against 13 types of bacteria that cause pneumococcal disease. Infants and young children usually need 4 doses of pneumococcal conjugate vaccine, at 2, 4, 6, and 15 13months of age. In some cases, a child might need fewer than 4 doses to complete PCV13 vaccination. A dose of PCV13 vaccine is also recommended for anyone 2 years or older with certain medical conditions if they did not already receive PCV13. This vaccine may be given to adults 72 years or older based on discussions between the patient and health care provider. Talk with your health care provider  Tell your vaccine provider if the person getting the vaccine:  Has had an allergic reaction after a previous dose of PCV13, to an earlier pneumococcal conjugate vaccine known as PCV7, or to any vaccine containing diphtheria toxoid (for example, DTaP), or has any severe, life-threatening allergies. In some cases, your health care provider may decide to postpone PCV13 vaccination to a future visit. People with minor illnesses, such as a cold, may be vaccinated. People who are moderately or severely ill should usually wait until they recover before getting PCV13. Your health care provider can give you more information. Risks of a vaccine reaction  Redness, swelling, pain, or tenderness where the shot is given, and fever, loss of appetite, fussiness (irritability), feeling tired, headache, and chills can happen after PCV13. Jackie Wilfredo children may be at increased risk for seizures caused by fever after PCV13 if it is administered at the same time as inactivated influenza vaccine.  Ask your health care provider for more information. People sometimes faint after medical procedures, including vaccination. Tell your provider if you feel dizzy or have vision changes or ringing in the ears. As with any medicine, there is a very remote chance of a vaccine causing a severe allergic reaction, other serious injury, or death. What if there is a serious problem? An allergic reaction could occur after the vaccinated person leaves the clinic. If you see signs of a severe allergic reaction (hives, swelling of the face and throat, difficulty breathing, a fast heartbeat, dizziness, or weakness), call 9-1-1 and get the person to the nearest hospital.  For other signs that concern you, call your health care provider. Adverse reactions should be reported to the Vaccine Adverse Event Reporting System (VAERS). Your health care provider will usually file this report, or you can do it yourself. Visit the VAERS website at www.vaers. hhs.gov or call 1-552.323.3145. VAERS is only for reporting reactions, and VAERS staff do not give medical advice. The National Vaccine Injury Compensation Program  The National Vaccine Injury Compensation Program (VICP) is a federal program that was created to compensate people who may have been injured by certain vaccines. Visit the VICP website at www.hrsa.gov/vaccinecompensation or call 9-758.574.6335 to learn about the program and about filing a claim. There is a time limit to file a claim for compensation. How can I learn more? Ask your health care provider. Call your local or state health department. Contact the Centers for Disease Control and Prevention (CDC): Call 7-793.337.5954 (1-800-CDC-INFO) or  Visit CDC's website at www.cdc.gov/vaccines  Vaccine Information Statement (Interim)  PCV13  10/30/2019  42 FERNANDO Bruno Hamlin [id number]  Department of Health and Human Services  Centers for Disease Control and Prevention  Many Vaccine Information Statements are available in [place]ian and other languages.  See www.immunize.org/vis. Muchas hojas de información sobre vacunas están disponibles en español y en otros idiomas. Visite www.immunize.org/vis. Care instructions adapted under license by [facility] ([facility]). If you have questions about a medical condition or this instruction, always ask your healthcare professional. [address] any warranty or liability for your use of this information.

## 2021-11-10 DIAGNOSIS — E87.5 HYPERKALEMIA: Primary | ICD-10-CM

## 2021-11-10 LAB
ESTIMATED AVERAGE GLUCOSE: 128.4 MG/DL
HBA1C MFR BLD: 6.1 %

## 2021-11-23 DIAGNOSIS — E87.5 HYPERKALEMIA: ICD-10-CM

## 2021-11-23 LAB — POTASSIUM SERPL-SCNC: 4.8 MMOL/L (ref 3.5–5.1)

## 2021-12-01 ENCOUNTER — OFFICE VISIT (OUTPATIENT)
Dept: ORTHOPEDIC SURGERY | Age: 66
End: 2021-12-01
Payer: MEDICARE

## 2021-12-01 VITALS — RESPIRATION RATE: 15 BRPM | HEIGHT: 64 IN | BODY MASS INDEX: 33.46 KG/M2 | WEIGHT: 196 LBS

## 2021-12-01 DIAGNOSIS — M65.30 TRIGGER FINGER, ACQUIRED: Primary | ICD-10-CM

## 2021-12-01 PROCEDURE — G8417 CALC BMI ABV UP PARAM F/U: HCPCS | Performed by: ORTHOPAEDIC SURGERY

## 2021-12-01 PROCEDURE — G8484 FLU IMMUNIZE NO ADMIN: HCPCS | Performed by: ORTHOPAEDIC SURGERY

## 2021-12-01 PROCEDURE — 1090F PRES/ABSN URINE INCON ASSESS: CPT | Performed by: ORTHOPAEDIC SURGERY

## 2021-12-01 PROCEDURE — 20550 NJX 1 TENDON SHEATH/LIGAMENT: CPT | Performed by: ORTHOPAEDIC SURGERY

## 2021-12-01 PROCEDURE — 99204 OFFICE O/P NEW MOD 45 MIN: CPT | Performed by: ORTHOPAEDIC SURGERY

## 2021-12-01 PROCEDURE — G8427 DOCREV CUR MEDS BY ELIG CLIN: HCPCS | Performed by: ORTHOPAEDIC SURGERY

## 2021-12-01 RX ORDER — TRIAMCINOLONE ACETONIDE 40 MG/ML
20 INJECTION, SUSPENSION INTRA-ARTICULAR; INTRAMUSCULAR ONCE
Status: COMPLETED | OUTPATIENT
Start: 2021-12-01 | End: 2021-12-01

## 2021-12-01 RX ADMIN — TRIAMCINOLONE ACETONIDE 20 MG: 40 INJECTION, SUSPENSION INTRA-ARTICULAR; INTRAMUSCULAR at 16:08

## 2021-12-01 NOTE — Clinical Note
Dear  Karol Moeller, APRN - CNP,    Thank you very much for your referral or Ms. Willard Dimas to me for evaluation and treatment of her Hand & Wrist condition. I appreciate your confidence in me and thank you for allowing me the opportunity to care for your patients. If I can be of any further assistance to you on this or any other patient, please do not hesitate to contact me. Sincerely,    Mary Bernardo.  Feliberto Harris MD

## 2021-12-02 NOTE — PROGRESS NOTES
Ms. Joanne Gale is a 73 y.o. right handed woman  who is seen today in Hand Surgical Consultation at the request of PRANAV Pat CNP. She presents today regarding left symptoms which have been present for approximately 8 weeks. A history of antecedent trauma or injury is Absent. She reports symptoms to include moderate pain and stiffness with frequent popping, catching or locking of the left Thumb. Finger symptoms are Daily worse in the morning or overnight. She reports mild pain located at the base of the symptomatic finger(s). Symptoms show no change over time. Previous treatment has included conservative measures. She does not claim relation of her symptoms to her required work activities. She has undergone any form of testing. I have today reviewed with Joanne Gale the clinically relevant, past medical history, medications, allergies,  family history, social history, and Review Of Systems & I have documented any details relevant to today's presenting complaints in my history above. Ms. Dorita Mcgee self-reported past medical history, medications, allergies,  family history, social history, and Review Of Systems have been scanned into the chart under the \"Media\" tab. Physical Exam:  Ms. Dorita Mcgee most recent vitals:  Vitals  Resp: 15  Height: 5' 4\" (162.6 cm)  Weight: 196 lb (88.9 kg)    She is well nourished, oriented to person, place & time. She demonstrates appropriate mood and affect as well as normal gait and station. Skin: Normal in appearance, Normal Color and Free of Lesions Bilaterally   Digital range of motion is without significant limitation bilaterally. Locked triggering is noted upon flexion in the left Thumb. There is an associated extension contracture of the IP joint. No other digit demonstrates evidence for stenosing tenosynovitis bilaterally.   Wrist range of motion is limited by Osteoarthritis on the Left, normal on the Right.  Sensation is present in the Whole Hand bilaterally  Vascular examination reveals normal and good capillary refill bilaterally  Swelling is mild in the symptomatic digit, absent elsewhere bilaterally  There is no evidence of gross joint instability bilaterally. Muscular strength is clinically appropriate bilaterally. Examination for Stenosing Tenosynovitis demonstrates moderate tenderness, thickening & nodularity at the A-1 pulley(s) of the Left Thumb. There is a palpable Nota's Node. There is Inducible triggering on active flexion with pain. No other digits demonstrate evidence of Stenosing Tenosynovitis. Radiographic Evaluation:  Radiographs, taken From another [de-identified] Office outside of my practice were Personally Reviewed & Interpreted by myself today (3 views of the left hand). They demonstrate no evidence of acute fracture, subluxation, or dislocation. 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 Impression:  Ms. Christiano Acevedo is showing clinical evidence of Stenosing Tenosynovitis (Trigger Finger) and presents requesting further treatment. Plan:    I have had a thorough discussion with Ms. Christiano Acevedo regarding the treatment options available for her initially presenting Left Thumb stenosing tenosynovitis, which is causing her functional limitations. I have outlined for Ms. Hanny Ryan the benefits and consequences of the various treatment modalities, including a reasonable expectation for the long term success and the likelihood that further more aggressive treatment may be required for her current presenting condition.   Based upon our current discussion and a reasonable understating of the options available to her, Ms. Christiano Acevedo has selected to proceed with  an injection to the left Thumb Flexor Tendon Sheath. I have outlined for her the nature of the injection, and the pre, chas and post injection considerations and the appropriate expectations for this injection. I have clearly explained to her that the above outlined treatment plan should not be expected to 'cure' her stenosing tenosynovitis, but we are rather treating the symptoms with which she presents. She has understood that in order to achieve long lasting relief of her symptoms and to prevent future worsening or further damage, that definitive surgical treatment would be required. Ms. Megan Polanco  voiced an appropriate understanding of our discussion, the options available to her, and of the expectations of her selected  treatment. She did wish to proceed with Left Thumb Flexor Tendon Sheath injection. Procedure:  left Thumb Trigger Finger Injection  [first Injection]: After full discussion of the nature of this process and outlining a treatment plan with Ms. Megan Polanco, we discussed the complications, limitations, expectations, alternatives, and risks of injection of the flexor tendon sheath. I have explained the potential for bleeding, infection, potential side effects of the medication, and the remote possibility of damage to surrounding structures as result of the injection. She understood this information well and verbally consented to this treatment. The skin of the symptomatic digit was prepped with Isopropyl Alcohol and under aseptic conditions the flexor tendon sheath was injected with a combination of 1/2 ml of 0.25% Bupivacaine without Epinephrine and 20 mg of Triamcinolone (40 mg/ml). Good filling of the flexor sheath was noted. A dry sterile bandage was applied and the patient tolerated the injection without difficulty. I advised the patient of the expected response, possible reactions and the instructions for care of the hand. I have also discussed with Ms. Ino Ryan the other treatment

## 2021-12-02 NOTE — PATIENT INSTRUCTIONS
Information & Instructions   After Finger, Hand, Wrist, or Elbow Injection    Geovanny Sofia MD    You have received an injection of local anesthetic (Bupivicaine without Epinephrine) for comfort & a steroid (Kenalog) for its strong anti-inflammatory effects. In order to give the medication a chance to reduce your inflammation and discomfort, it is recommended that you take it easy for a day or so. You may use your hand and arm as you feel comfortable, but you should avoid highly strenuous activity and heavy use for several days. Relief from the injection will often not begin for several days, and you may not feel full relief for up to one month. It is not uncommon to experience some local discomfort or pain at or around the injection site for a few days. To relieve these symptoms you may do the following if you feel necessary:       Apply ice to the affected area 20 minutes on and 20 minutes off. Do not apply ice directly to the skin. Use a thin layer (T-shirt, pillowcase, towel, etc.) to protect the skin. - If allowed by your other medical physicians, you may take -     2 Tylenol extra strength tablets every 4-6 hours       1-2 Aleve tablets twice a day     2-3 Advil tablets two to three times a day    If you are diabetic, the steroid medication may increase your blood sugar, so you are advised to monitor your sugar more closely so you can adjust it accordingly for a few days following your injection. If you need assistance with the control of you blood sugar, please contact you primary care physician for further advice. I will request that you please call the office one month after your injection at [phone] if you have not experienced relief of your symptoms (unless I have instructed you otherwise). If your injection has given you good relief of you symptoms as expected, then you only need to call the office if your symptoms return.

## 2022-01-17 ENCOUNTER — TELEPHONE (OUTPATIENT)
Dept: WOMENS IMAGING | Age: 67
End: 2022-01-17

## 2022-01-17 ENCOUNTER — HOSPITAL ENCOUNTER (OUTPATIENT)
Dept: WOMENS IMAGING | Age: 67
Discharge: HOME | End: 2022-01-17
Payer: MEDICARE

## 2022-01-17 DIAGNOSIS — Z12.31 SCREENING MAMMOGRAM, ENCOUNTER FOR: ICD-10-CM

## 2022-01-17 PROCEDURE — 77063 BREAST TOMOSYNTHESIS BI: CPT

## 2022-02-10 ENCOUNTER — HOSPITAL ENCOUNTER (OUTPATIENT)
Dept: CT IMAGING | Age: 67
Discharge: HOME | End: 2022-02-10
Payer: MEDICARE

## 2022-02-10 DIAGNOSIS — C18.2 MALIGNANT NEOPLASM OF ASCENDING COLON (HCC): ICD-10-CM

## 2022-02-10 LAB
GFR AFRICAN AMERICAN: >60
GFR NON-AFRICAN AMERICAN: >60
PERFORMED ON: NORMAL
POC CREATININE: 0.7 MG/DL (ref 0.6–1.2)
POC SAMPLE TYPE: NORMAL

## 2022-02-10 PROCEDURE — 82565 ASSAY OF CREATININE: CPT

## 2022-02-10 PROCEDURE — 71260 CT THORAX DX C+: CPT

## 2022-02-10 PROCEDURE — 6360000004 HC RX CONTRAST MEDICATION: Performed by: NURSE PRACTITIONER

## 2022-02-10 RX ADMIN — IOPAMIDOL 75 ML: 755 INJECTION, SOLUTION INTRAVENOUS at 08:30

## 2022-02-10 RX ADMIN — IOHEXOL 50 ML: 240 INJECTION, SOLUTION INTRATHECAL; INTRAVASCULAR; INTRAVENOUS; ORAL at 08:27

## 2022-11-10 ENCOUNTER — OFFICE VISIT (OUTPATIENT)
Dept: FAMILY MEDICINE CLINIC | Age: 67
End: 2022-11-10
Payer: MEDICARE

## 2022-11-10 VITALS
DIASTOLIC BLOOD PRESSURE: 82 MMHG | SYSTOLIC BLOOD PRESSURE: 136 MMHG | TEMPERATURE: 98.3 F | WEIGHT: 211 LBS | RESPIRATION RATE: 16 BRPM | BODY MASS INDEX: 36.02 KG/M2 | HEIGHT: 64 IN

## 2022-11-10 DIAGNOSIS — Z00.00 HEALTHCARE MAINTENANCE: ICD-10-CM

## 2022-11-10 DIAGNOSIS — Z00.00 INITIAL MEDICARE ANNUAL WELLNESS VISIT: Primary | ICD-10-CM

## 2022-11-10 DIAGNOSIS — Z23 FLU VACCINE NEED: ICD-10-CM

## 2022-11-10 DIAGNOSIS — Z78.0 POST-MENOPAUSAL: ICD-10-CM

## 2022-11-10 DIAGNOSIS — R73.03 PRE-DIABETES: ICD-10-CM

## 2022-11-10 DIAGNOSIS — Z12.31 ENCOUNTER FOR SCREENING MAMMOGRAM FOR MALIGNANT NEOPLASM OF BREAST: ICD-10-CM

## 2022-11-10 DIAGNOSIS — Z85.038 HISTORY OF COLON CANCER: ICD-10-CM

## 2022-11-10 PROBLEM — D50.9 IRON DEFICIENCY ANEMIA: Status: RESOLVED | Noted: 2021-01-27 | Resolved: 2022-11-10

## 2022-11-10 PROBLEM — C18.2 MALIGNANT NEOPLASM OF ASCENDING COLON (HCC): Status: RESOLVED | Noted: 2021-01-27 | Resolved: 2022-11-10

## 2022-11-10 PROCEDURE — G0438 PPPS, INITIAL VISIT: HCPCS | Performed by: NURSE PRACTITIONER

## 2022-11-10 PROCEDURE — G0008 ADMIN INFLUENZA VIRUS VAC: HCPCS | Performed by: NURSE PRACTITIONER

## 2022-11-10 PROCEDURE — 1090F PRES/ABSN URINE INCON ASSESS: CPT | Performed by: NURSE PRACTITIONER

## 2022-11-10 PROCEDURE — G8484 FLU IMMUNIZE NO ADMIN: HCPCS | Performed by: NURSE PRACTITIONER

## 2022-11-10 PROCEDURE — 1123F ACP DISCUSS/DSCN MKR DOCD: CPT | Performed by: NURSE PRACTITIONER

## 2022-11-10 PROCEDURE — 1036F TOBACCO NON-USER: CPT | Performed by: NURSE PRACTITIONER

## 2022-11-10 PROCEDURE — G8427 DOCREV CUR MEDS BY ELIG CLIN: HCPCS | Performed by: NURSE PRACTITIONER

## 2022-11-10 PROCEDURE — G8400 PT W/DXA NO RESULTS DOC: HCPCS | Performed by: NURSE PRACTITIONER

## 2022-11-10 PROCEDURE — G8417 CALC BMI ABV UP PARAM F/U: HCPCS | Performed by: NURSE PRACTITIONER

## 2022-11-10 PROCEDURE — 3017F COLORECTAL CA SCREEN DOC REV: CPT | Performed by: NURSE PRACTITIONER

## 2022-11-10 PROCEDURE — 90694 VACC AIIV4 NO PRSRV 0.5ML IM: CPT | Performed by: NURSE PRACTITIONER

## 2022-11-10 PROCEDURE — 99213 OFFICE O/P EST LOW 20 MIN: CPT | Performed by: NURSE PRACTITIONER

## 2022-11-10 SDOH — ECONOMIC STABILITY: FOOD INSECURITY: WITHIN THE PAST 12 MONTHS, THE FOOD YOU BOUGHT JUST DIDN'T LAST AND YOU DIDN'T HAVE MONEY TO GET MORE.: NEVER TRUE

## 2022-11-10 SDOH — ECONOMIC STABILITY: FOOD INSECURITY: WITHIN THE PAST 12 MONTHS, YOU WORRIED THAT YOUR FOOD WOULD RUN OUT BEFORE YOU GOT MONEY TO BUY MORE.: NEVER TRUE

## 2022-11-10 ASSESSMENT — PATIENT HEALTH QUESTIONNAIRE - PHQ9
SUM OF ALL RESPONSES TO PHQ QUESTIONS 1-9: 0
SUM OF ALL RESPONSES TO PHQ QUESTIONS 1-9: 0
SUM OF ALL RESPONSES TO PHQ9 QUESTIONS 1 & 2: 0
7. TROUBLE CONCENTRATING ON THINGS, SUCH AS READING THE NEWSPAPER OR WATCHING TELEVISION: 0
1. LITTLE INTEREST OR PLEASURE IN DOING THINGS: 0
SUM OF ALL RESPONSES TO PHQ QUESTIONS 1-9: 0
9. THOUGHTS THAT YOU WOULD BE BETTER OFF DEAD, OR OF HURTING YOURSELF: 0
4. FEELING TIRED OR HAVING LITTLE ENERGY: 0
2. FEELING DOWN, DEPRESSED OR HOPELESS: 0
SUM OF ALL RESPONSES TO PHQ QUESTIONS 1-9: 0
3. TROUBLE FALLING OR STAYING ASLEEP: 0
8. MOVING OR SPEAKING SO SLOWLY THAT OTHER PEOPLE COULD HAVE NOTICED. OR THE OPPOSITE, BEING SO FIGETY OR RESTLESS THAT YOU HAVE BEEN MOVING AROUND A LOT MORE THAN USUAL: 0
6. FEELING BAD ABOUT YOURSELF - OR THAT YOU ARE A FAILURE OR HAVE LET YOURSELF OR YOUR FAMILY DOWN: 0
5. POOR APPETITE OR OVEREATING: 0

## 2022-11-10 ASSESSMENT — ENCOUNTER SYMPTOMS
GASTROINTESTINAL NEGATIVE: 1
EYES NEGATIVE: 1
RESPIRATORY NEGATIVE: 1

## 2022-11-10 ASSESSMENT — ANXIETY QUESTIONNAIRES
4. TROUBLE RELAXING: 0
GAD7 TOTAL SCORE: 0
7. FEELING AFRAID AS IF SOMETHING AWFUL MIGHT HAPPEN: 0
3. WORRYING TOO MUCH ABOUT DIFFERENT THINGS: 0
2. NOT BEING ABLE TO STOP OR CONTROL WORRYING: 0
5. BEING SO RESTLESS THAT IT IS HARD TO SIT STILL: 0
6. BECOMING EASILY ANNOYED OR IRRITABLE: 0
1. FEELING NERVOUS, ANXIOUS, OR ON EDGE: 0

## 2022-11-10 ASSESSMENT — LIFESTYLE VARIABLES
HOW MANY STANDARD DRINKS CONTAINING ALCOHOL DO YOU HAVE ON A TYPICAL DAY: 1 OR 2
HOW OFTEN DO YOU HAVE A DRINK CONTAINING ALCOHOL: MONTHLY OR LESS

## 2022-11-10 ASSESSMENT — SOCIAL DETERMINANTS OF HEALTH (SDOH): HOW HARD IS IT FOR YOU TO PAY FOR THE VERY BASICS LIKE FOOD, HOUSING, MEDICAL CARE, AND HEATING?: NOT HARD AT ALL

## 2022-11-10 NOTE — PROGRESS NOTES
11/10/2022    Octavio Valle (:  1955) is a 78 y.o. female, here for a preventive medicine evaluation. Pt is here for her AWV. She is not fasting for labs, but states that she can come back for fasting labs tomorrow morning. H/o predibetes. Last A1C 2021 was 6.1. She is not on medication for diabetes. H/o colon cancer. S/p partial colectomy . Following Dr. Cristiane Manning at [facility]. She is not doing chemo or radiation. Colonoscopy done 2022, due for f/u 2025. Follows Dr. Jerry Osorio with [facility]. Post menopausal.  She has never had a DEXA scan. She does not take any medications on a daily basis. Last mammogram done 2022 - normal.    She would like to get her flu vaccine and COVID-19 booster today. Patient Active Problem List   Diagnosis    Diverticulosis of colon    S/P partial colectomy    Post-menopausal    History of colon cancer    Pre-diabetes       Review of Systems   Constitutional: Negative. HENT: Negative. Eyes: Negative. Respiratory: Negative. Cardiovascular: Negative. Gastrointestinal: Negative. Genitourinary: Negative. Musculoskeletal: Negative. Skin: Negative. Neurological: Negative. Psychiatric/Behavioral: Negative.        Prior to Visit Medications    Not on File        Allergies   Allergen Reactions    Novocain [Procaine] Rash       Past Medical History:   Diagnosis Date    Anemia     Cancer ([address]     colon    History of scarlet fever        Past Surgical History:   Procedure Laterality Date     SECTION      COLONOSCOPY N/A 2021    COLONOSCOPY WITH BIOPSY performed by Wen Marvin MD at [facility]  2021    COLONOSCOPY SUBMUCOSAL TATTOO INJECTION performed by Wen Marvin MD at [address] [place] [address] Right 2021    OPEN RIGHT COLECTOMY performed by Ozzie Lennon MD at [facility] N/A 1/27/2021    EGD BIOPSY performed by Too Johnson MD at [address] History     Socioeconomic History    Marital status:      Spouse name: Not on file    Number of children: 2    Years of education: Not on file    Highest education level: Not on file   Occupational History    Not on file   Tobacco Use    Smoking status: Former     Packs/day: 2.00     Years: 30.00     Pack years: 60.00     Types: Cigarettes     Start date: 9/29/1972     Quit date: 1/1/2012     Years since quitting: 10.8    Smokeless tobacco: Never   Vaping Use    Vaping Use: Never used   Substance and Sexual Activity    Alcohol use: Yes     Comment: 4 x per year    Drug use: Never    Sexual activity: Not Currently   Other Topics Concern    Not on file   Social History Narrative    Not on file     Social Determinants of Health     Financial Resource Strain: Low Risk     Difficulty of Paying Living Expenses: Not hard at all   Food Insecurity: No Food Insecurity    Worried About Running Out of Food in the Last Year: Never true    [address] in the Last Year: Never true   Transportation Needs: Not on file   Physical Activity: Sufficiently Active    Days of Exercise per Week: 7 days    Minutes of Exercise per Session: 60 min   Stress: Not on file   Social Connections: Not on file   Intimate Partner Violence: Not on file   Housing Stability: Not on file        Family History   Problem Relation Age of Onset    High Blood Pressure Mother     Diabetes Father     Cancer Brother        ADVANCE DIRECTIVE: N, <no information>    Vitals:    11/10/22 0756   BP: 136/82   Site: Left Upper Arm   Position: Sitting   Resp: 16   Temp: 98.3 °F (36.8 °C)   Weight: 211 lb (95.7 kg)   Height: 5' 4\" (1.626 m)     Estimated body mass index is 36.22 kg/m² as calculated from the following:    Height as of this encounter: 5' 4\" (1.626 m). Weight as of this encounter: 211 lb (95.7 kg). Physical Exam  Vitals reviewed. Constitutional:       Appearance: Normal appearance. She is well-developed. She is not ill-appearing. HENT:      Head: Normocephalic. Eyes:      General: Lids are normal.         Right eye: No discharge. Left eye: No discharge. Neck:      Thyroid: No thyroid mass, thyromegaly or thyroid tenderness. Vascular: Normal carotid pulses. No carotid bruit or JVD. Cardiovascular:      Rate and Rhythm: Normal rate and regular rhythm. Pulses: Normal pulses. Heart sounds: Normal heart sounds. No murmur heard. Pulmonary:      Effort: Pulmonary effort is normal.      Breath sounds: Normal breath sounds. Abdominal:      Tenderness: There is no abdominal tenderness. Musculoskeletal:         General: Normal range of motion. Cervical back: Full passive range of motion without pain and normal range of motion. No edema or crepitus. No pain with movement. Normal range of motion. Lymphadenopathy:      Cervical: No cervical adenopathy. Right cervical: No superficial cervical adenopathy. Left cervical: No superficial cervical adenopathy. Skin:     General: Skin is warm and dry. Capillary Refill: Capillary refill takes less than 2 seconds. Neurological:      General: No focal deficit present. Mental Status: She is alert and oriented to person, place, and time. Psychiatric:         Mood and Affect: Mood normal.         Behavior: Behavior normal. Behavior is cooperative. Thought Content: Thought content normal.         Judgment: Judgment normal.       No flowsheet data found.     Lab Results   Component Value Date/Time    CHOL 199 09/20/2020 12:00 AM    TRIG 142 09/20/2020 12:00 AM    HDL 73 09/20/2020 12:00 AM    LDLCALC 97 09/20/2020 12:00 AM    GLUCOSE 104 11/09/2021 09:11 AM    LABA1C 6.1 11/09/2021 09:11 AM    LABA1C 5.2 03/25/2021 08:24 AM    LABA1C 6.8 12/17/2020 02:59 PM       The 10-year ASCVD risk score (Wanda DK, et al., 2019) is: 6.2%    Values used to calculate the score:      Age: 78 years      Sex: Female      Is Non- : No      Diabetic: No      Tobacco smoker: No      Systolic Blood Pressure: 095 mmHg      Is BP treated: No      HDL Cholesterol: 73 mg/dL      Total Cholesterol: 199 mg/dL    Immunization History   Administered Date(s) Administered    COVID-19, PFIZER PURPLE top, DILUTE for use, (age 15 y+), 30mcg/0.3mL 03/31/2021, 04/21/2021, 01/22/2022    Hepatitis A Adult (Havrix, Vaqta) 04/29/2019    Influenza Virus Vaccine 10/23/2019, 09/22/2020, 10/25/2020    Influenza, AFLURIA (age 1 yrs+), FLUZONE, (age 10 mo+), MDV, 0.5mL 10/25/2020    Influenza, FLUAD, (age 72 y+), Adjuvanted, 0.5mL 11/09/2021, 11/10/2022    Influenza, FLUBLOK, (age 25 y+), PF, 0.5mL 10/23/2019, 09/22/2020    Pneumococcal Conjugate 13-valent ([id number]) 11/09/2021    Pneumococcal Polysaccharide ([id number]) 10/28/2016    Tdap (Boostrix, Adacel) 10/23/2019    Zoster Recombinant (Shingrix) 09/22/2020, 12/03/2020       Health Maintenance   Topic Date Due    DEXA (modify frequency per FRAX score)  Never done    COVID-19 Vaccine (4 - Booster for Pfizer series) 03/19/2022    Pneumococcal 65+ years Vaccine (3) 11/09/2022    A1C test (Diabetic or Prediabetic)  11/09/2022    Depression Screen  11/09/2022    Annual Wellness Visit (AWV)  11/11/2023    Breast cancer screen  01/17/2024    Lipids  09/20/2025    DTaP/Tdap/Td vaccine (2 - Td or Tdap) 10/23/2029    Colorectal Cancer Screen  01/27/2031    Flu vaccine  Completed    Shingles vaccine  Completed    Hepatitis C screen  Completed    Hepatitis A vaccine  Aged Out    Hib vaccine  Aged Out    Meningococcal (ACWY) vaccine  Aged Out    Low dose CT lung screening  Discontinued       Assessment & Plan     Initial Medicare annual wellness visit    Healthcare maintenance  Encouraged healthy diet and exercise. Recommended routine dental and vision exams. Will come back in tomorrow morning for routine fasting labs.   Pt will stop at our pharmacy to discuss pneumonia and COVID-19 boosters. -     CBC with Auto Differential; Future  -     Comprehensive Metabolic Panel w/ Reflex to MG; Future  -     Lipid Panel; Future  -     T4, Free; Future  -     TSH; Future  -     Hemoglobin A1C; Future    Post-menopausal  Recommended that the pt call her insurance company prior to scheduling this to make sure that it is covered. -     DEXA BONE DENSITY AXIAL SKELETON; Future    Pre-diabetes  She is not on medication. Encouraged lifestyle modifications. Will recheck A1C and f/u with pt regarding results and POC.      -     Comprehensive Metabolic Panel w/ Reflex to MG; Future  -     Hemoglobin A1C; Future    History of colon cancer  S/p partial colectomy in 2021. Following Dr. Ellington Comment with [facility] and Dr. Deuce Amador with [facility]. Last colonoscopy done 2/2022, not due for repeat until 2/2025. Encounter for screening mammogram for malignant neoplasm of breast  Not due until mid January 2023.    -     ADOLFO DIGITAL SCREEN W OR WO CAD BILATERAL; Future    Flu vaccine need  -     Influenza, FLUAD, (age 72 y+), IM, Preservative Free, 0.5 mL    Return for Medicare Annual Wellness Visit in 1 year.          --Yuki Oliveira, APRN - CNP

## 2022-11-10 NOTE — PROGRESS NOTES
Medicare Annual Wellness Visit    Mortimer Bookbinder is here for No chief complaint on file. Assessment & Plan   Initial Medicare annual wellness visit  Healthcare maintenance  -     CBC with Auto Differential; Future  -     Comprehensive Metabolic Panel w/ Reflex to MG; Future  -     Lipid Panel; Future  -     T4, Free; Future  -     TSH; Future  -     Hemoglobin A1C; Future  Post-menopausal  -     DEXA BONE DENSITY AXIAL SKELETON; Future  Pre-diabetes  -     Comprehensive Metabolic Panel w/ Reflex to MG; Future  -     Hemoglobin A1C; Future  History of colon cancer  Encounter for screening mammogram for malignant neoplasm of breast  -     ADOLFO DIGITAL SCREEN W OR WO CAD BILATERAL; Future  Flu vaccine need  -     Influenza, FLUAD, (age 72 y+), IM, Preservative Free, 0.5 mL    Recommendations for Preventive Services Due: see orders and patient instructions/AVS.  Recommended screening schedule for the next 5-10 years is provided to the patient in written form: see Patient Instructions/AVS.     Return for Medicare Annual Wellness Visit in 1 year. Subjective   The following acute and/or chronic problems were also addressed today:  See separate note    Patient's complete Health Risk Assessment and screening values have been reviewed and are found in Flowsheets. The following problems were reviewed today and where indicated follow up appointments were made and/or referrals ordered.     Positive Risk Factor Screenings with Interventions:             General Health and ACP:  General  In general, how would you say your health is?: Excellent  In the past 7 days, have you experienced any of the following: New or Increased Pain, New or Increased Fatigue, Loneliness, Social Isolation, Stress or Anger?: No  Do you get the social and emotional support that you need?: Yes  Do you have a Living Will?: Yes    Advance Directives       Power of  Living Will ACP-Advance Directive ACP-Power of     Not on File Not on File Not on File Not on File          General Health Risk Interventions:  N/A    Health Habits/Nutrition:  Physical Activity: Sufficiently Active    Days of Exercise per Week: 7 days    Minutes of Exercise per Session: 60 min     Have you lost any weight without trying in the past 3 months?: No  Body mass index: (!) 36.21  Have you seen the dentist within the past year?: (!) No  Health Habits/Nutrition Interventions:  Inadequate physical activity:  educational materials provided to promote increased physical activity  Nutritional issues:  educational materials for healthy, well-balanced diet provided  Dental exam overdue:  patient encouraged to make appointment with his/her dentist    Hearing/Vision:  Do you or your family notice any trouble with your hearing that hasn't been managed with hearing aids?: No  Do you have difficulty driving, watching TV, or doing any of your daily activities because of your eyesight?: No  Have you had an eye exam within the past year?: (!) No  No results found. Hearing/Vision Interventions:  Vision concerns:  patient encouraged to make appointment with his/her eye specialist     Safety Interventions:  Home safety tips provided     ADL Interventions:  N/A          Objective   Vitals:    11/10/22 0756   BP: 136/82   Site: Left Upper Arm   Position: Sitting   Resp: 16   Temp: 98.3 °F (36.8 °C)   Weight: 211 lb (95.7 kg)   Height: 5' 4\" (1.626 m)      Body mass index is 36.22 kg/m².       See separate note for PE       Allergies   Allergen Reactions    Novocain [Procaine] Rash     Prior to Visit Medications    Not on File       CareTeam (Including outside providers/suppliers regularly involved in providing care):   Patient Care Team:  PRANAV Olmos CNP as PCP - General (Nurse Practitioner)  PRANAV Olmos CNP as PCP - [facility] Empaneled Provider     Reviewed and updated this visit:  Allergies  Meds  Problems

## 2022-11-14 DIAGNOSIS — R73.03 PRE-DIABETES: ICD-10-CM

## 2022-11-14 DIAGNOSIS — Z00.00 HEALTHCARE MAINTENANCE: ICD-10-CM

## 2022-11-14 LAB
A/G RATIO: 1.9 (ref 1.1–2.2)
ALBUMIN SERPL-MCNC: 4.1 G/DL (ref 3.4–5)
ALP BLD-CCNC: 76 U/L (ref 40–129)
ALT SERPL-CCNC: 13 U/L (ref 10–40)
ANION GAP SERPL CALCULATED.3IONS-SCNC: 12 MMOL/L (ref 3–16)
AST SERPL-CCNC: 15 U/L (ref 15–37)
BASOPHILS ABSOLUTE: 0 K/UL (ref 0–0.2)
BASOPHILS RELATIVE PERCENT: 0.4 %
BILIRUB SERPL-MCNC: <0.2 MG/DL (ref 0–1)
BUN BLDV-MCNC: 14 MG/DL (ref 7–20)
CALCIUM SERPL-MCNC: 9.5 MG/DL (ref 8.3–10.6)
CHLORIDE BLD-SCNC: 101 MMOL/L (ref 99–110)
CHOLESTEROL, TOTAL: 181 MG/DL (ref 0–199)
CO2: 27 MMOL/L (ref 21–32)
CREAT SERPL-MCNC: 0.8 MG/DL (ref 0.6–1.2)
EOSINOPHILS ABSOLUTE: 0.1 K/UL (ref 0–0.6)
EOSINOPHILS RELATIVE PERCENT: 1 %
GFR SERPL CREATININE-BSD FRML MDRD: >60 ML/MIN/{1.73_M2}
GLUCOSE BLD-MCNC: 102 MG/DL (ref 70–99)
HCT VFR BLD CALC: 39.7 % (ref 36–48)
HDLC SERPL-MCNC: 46 MG/DL (ref 40–60)
HEMOGLOBIN: 13.2 G/DL (ref 12–16)
LDL CHOLESTEROL CALCULATED: 117 MG/DL
LYMPHOCYTES ABSOLUTE: 1.6 K/UL (ref 1–5.1)
LYMPHOCYTES RELATIVE PERCENT: 21.9 %
MCH RBC QN AUTO: 31.4 PG (ref 26–34)
MCHC RBC AUTO-ENTMCNC: 33.3 G/DL (ref 31–36)
MCV RBC AUTO: 94.2 FL (ref 80–100)
MONOCYTES ABSOLUTE: 0.4 K/UL (ref 0–1.3)
MONOCYTES RELATIVE PERCENT: 5.6 %
NEUTROPHILS ABSOLUTE: 5.3 K/UL (ref 1.7–7.7)
NEUTROPHILS RELATIVE PERCENT: 71.1 %
PDW BLD-RTO: 12.2 % (ref 12.4–15.4)
PLATELET # BLD: 262 K/UL (ref 135–450)
PMV BLD AUTO: 8.7 FL (ref 5–10.5)
POTASSIUM REFLEX MAGNESIUM: 4.7 MMOL/L (ref 3.5–5.1)
RBC # BLD: 4.22 M/UL (ref 4–5.2)
SODIUM BLD-SCNC: 140 MMOL/L (ref 136–145)
T4 FREE: 1.2 NG/DL (ref 0.9–1.8)
TOTAL PROTEIN: 6.3 G/DL (ref 6.4–8.2)
TRIGL SERPL-MCNC: 90 MG/DL (ref 0–150)
TSH SERPL DL<=0.05 MIU/L-ACNC: 1.45 UIU/ML (ref 0.27–4.2)
VLDLC SERPL CALC-MCNC: 18 MG/DL
WBC # BLD: 7.4 K/UL (ref 4–11)

## 2022-11-15 LAB
ESTIMATED AVERAGE GLUCOSE: 131.2 MG/DL
HBA1C MFR BLD: 6.2 %

## 2022-11-16 PROBLEM — E78.00 ELEVATED LDL CHOLESTEROL LEVEL: Status: ACTIVE | Noted: 2022-11-16

## 2022-11-23 ENCOUNTER — HOSPITAL ENCOUNTER (OUTPATIENT)
Dept: WOMENS IMAGING | Age: 67
Discharge: HOME | End: 2022-11-23
Payer: MEDICARE

## 2022-11-23 DIAGNOSIS — Z78.0 POST-MENOPAUSAL: ICD-10-CM

## 2022-11-23 PROCEDURE — 77080 DXA BONE DENSITY AXIAL: CPT

## 2022-11-29 DIAGNOSIS — M81.0 AGE-RELATED OSTEOPOROSIS WITHOUT CURRENT PATHOLOGICAL FRACTURE: Primary | ICD-10-CM

## 2022-12-01 DIAGNOSIS — E55.9 VITAMIN D DEFICIENCY: Primary | ICD-10-CM

## 2022-12-01 RX ORDER — ERGOCALCIFEROL 1.25 MG/1
50000 CAPSULE ORAL WEEKLY
Qty: 12 CAPSULE | Refills: 1 | Status: SHIPPED | OUTPATIENT
Start: 2022-12-01

## 2023-02-01 ENCOUNTER — HOSPITAL ENCOUNTER (OUTPATIENT)
Dept: WOMENS IMAGING | Age: 68
Discharge: HOME | End: 2023-02-01
Payer: MEDICARE

## 2023-02-01 DIAGNOSIS — Z12.31 ENCOUNTER FOR SCREENING MAMMOGRAM FOR MALIGNANT NEOPLASM OF BREAST: ICD-10-CM

## 2023-02-01 PROCEDURE — 77067 SCR MAMMO BI INCL CAD: CPT

## 2023-03-15 ENCOUNTER — HOSPITAL ENCOUNTER (OUTPATIENT)
Dept: CT IMAGING | Age: 68
Discharge: HOME | End: 2023-03-15
Payer: MEDICARE

## 2023-03-15 DIAGNOSIS — C18.2 MALIGNANT NEOPLASM OF ASCENDING COLON (HCC): ICD-10-CM

## 2023-03-15 LAB
PERFORMED ON: NORMAL
POC CREATININE: 0.8 MG/DL (ref 0.6–1.2)
POC SAMPLE TYPE: NORMAL

## 2023-03-15 PROCEDURE — 6360000004 HC RX CONTRAST MEDICATION: Performed by: NURSE PRACTITIONER

## 2023-03-15 PROCEDURE — 71260 CT THORAX DX C+: CPT

## 2023-03-15 PROCEDURE — 82565 ASSAY OF CREATININE: CPT

## 2023-03-15 RX ADMIN — DIATRIZOATE MEGLUMINE AND DIATRIZOATE SODIUM 12 ML: 660; 100 LIQUID ORAL; RECTAL at 08:53

## 2023-03-15 RX ADMIN — IOPAMIDOL 75 ML: 755 INJECTION, SOLUTION INTRAVENOUS at 08:54

## 2023-03-23 ENCOUNTER — HOSPITAL ENCOUNTER (OUTPATIENT)
Dept: ULTRASOUND IMAGING | Age: 68
Discharge: HOME | End: 2023-03-23
Payer: MEDICARE

## 2023-03-23 DIAGNOSIS — C18.2 MALIGNANT NEOPLASM OF ASCENDING COLON (HCC): ICD-10-CM

## 2023-03-23 PROCEDURE — 76705 ECHO EXAM OF ABDOMEN: CPT

## 2023-11-16 ENCOUNTER — OFFICE VISIT (OUTPATIENT)
Dept: FAMILY MEDICINE CLINIC | Age: 68
End: 2023-11-16

## 2023-11-16 VITALS
BODY MASS INDEX: 35.41 KG/M2 | WEIGHT: 207.4 LBS | SYSTOLIC BLOOD PRESSURE: 138 MMHG | HEIGHT: 64 IN | TEMPERATURE: 97.2 F | HEART RATE: 77 BPM | DIASTOLIC BLOOD PRESSURE: 72 MMHG | OXYGEN SATURATION: 99 %

## 2023-11-16 DIAGNOSIS — Z83.49 FAMILY HISTORY OF THYROID DISORDER: ICD-10-CM

## 2023-11-16 DIAGNOSIS — E55.9 VITAMIN D DEFICIENCY: ICD-10-CM

## 2023-11-16 DIAGNOSIS — Z23 NEED FOR INFLUENZA VACCINATION: ICD-10-CM

## 2023-11-16 DIAGNOSIS — R73.03 PRE-DIABETES: ICD-10-CM

## 2023-11-16 DIAGNOSIS — E78.00 PURE HYPERCHOLESTEROLEMIA: Primary | ICD-10-CM

## 2023-11-16 DIAGNOSIS — E66.01 SEVERE OBESITY (BMI 35.0-39.9) WITH COMORBIDITY (HCC): ICD-10-CM

## 2023-11-16 DIAGNOSIS — M81.0 AGE-RELATED OSTEOPOROSIS WITHOUT CURRENT PATHOLOGICAL FRACTURE: ICD-10-CM

## 2023-11-16 SDOH — ECONOMIC STABILITY: FOOD INSECURITY: WITHIN THE PAST 12 MONTHS, YOU WORRIED THAT YOUR FOOD WOULD RUN OUT BEFORE YOU GOT MONEY TO BUY MORE.: NEVER TRUE

## 2023-11-16 SDOH — ECONOMIC STABILITY: INCOME INSECURITY: HOW HARD IS IT FOR YOU TO PAY FOR THE VERY BASICS LIKE FOOD, HOUSING, MEDICAL CARE, AND HEATING?: NOT HARD AT ALL

## 2023-11-16 SDOH — ECONOMIC STABILITY: HOUSING INSECURITY
IN THE LAST 12 MONTHS, WAS THERE A TIME WHEN YOU DID NOT HAVE A STEADY PLACE TO SLEEP OR SLEPT IN A SHELTER (INCLUDING NOW)?: NO

## 2023-11-16 SDOH — ECONOMIC STABILITY: FOOD INSECURITY: WITHIN THE PAST 12 MONTHS, THE FOOD YOU BOUGHT JUST DIDN'T LAST AND YOU DIDN'T HAVE MONEY TO GET MORE.: NEVER TRUE

## 2023-11-16 ASSESSMENT — PATIENT HEALTH QUESTIONNAIRE - PHQ9
7. TROUBLE CONCENTRATING ON THINGS, SUCH AS READING THE NEWSPAPER OR WATCHING TELEVISION: 0
SUM OF ALL RESPONSES TO PHQ QUESTIONS 1-9: 0
6. FEELING BAD ABOUT YOURSELF - OR THAT YOU ARE A FAILURE OR HAVE LET YOURSELF OR YOUR FAMILY DOWN: 0
10. IF YOU CHECKED OFF ANY PROBLEMS, HOW DIFFICULT HAVE THESE PROBLEMS MADE IT FOR YOU TO DO YOUR WORK, TAKE CARE OF THINGS AT HOME, OR GET ALONG WITH OTHER PEOPLE: 0
SUM OF ALL RESPONSES TO PHQ QUESTIONS 1-9: 0
3. TROUBLE FALLING OR STAYING ASLEEP: 0
9. THOUGHTS THAT YOU WOULD BE BETTER OFF DEAD, OR OF HURTING YOURSELF: 0
5. POOR APPETITE OR OVEREATING: 0
8. MOVING OR SPEAKING SO SLOWLY THAT OTHER PEOPLE COULD HAVE NOTICED. OR THE OPPOSITE, BEING SO FIGETY OR RESTLESS THAT YOU HAVE BEEN MOVING AROUND A LOT MORE THAN USUAL: 0
4. FEELING TIRED OR HAVING LITTLE ENERGY: 0
1. LITTLE INTEREST OR PLEASURE IN DOING THINGS: 0
SUM OF ALL RESPONSES TO PHQ QUESTIONS 1-9: 0
SUM OF ALL RESPONSES TO PHQ9 QUESTIONS 1 & 2: 0
SUM OF ALL RESPONSES TO PHQ QUESTIONS 1-9: 0
2. FEELING DOWN, DEPRESSED OR HOPELESS: 0

## 2023-11-16 ASSESSMENT — ANXIETY QUESTIONNAIRES
4. TROUBLE RELAXING: 0
2. NOT BEING ABLE TO STOP OR CONTROL WORRYING: 0
5. BEING SO RESTLESS THAT IT IS HARD TO SIT STILL: 0
GAD7 TOTAL SCORE: 0
1. FEELING NERVOUS, ANXIOUS, OR ON EDGE: 0
3. WORRYING TOO MUCH ABOUT DIFFERENT THINGS: 0
IF YOU CHECKED OFF ANY PROBLEMS ON THIS QUESTIONNAIRE, HOW DIFFICULT HAVE THESE PROBLEMS MADE IT FOR YOU TO DO YOUR WORK, TAKE CARE OF THINGS AT HOME, OR GET ALONG WITH OTHER PEOPLE: NOT DIFFICULT AT ALL
7. FEELING AFRAID AS IF SOMETHING AWFUL MIGHT HAPPEN: 0
6. BECOMING EASILY ANNOYED OR IRRITABLE: 0

## 2023-11-16 ASSESSMENT — ENCOUNTER SYMPTOMS
ABDOMINAL PAIN: 0
CONSTIPATION: 0
VOMITING: 0
DIARRHEA: 0
RHINORRHEA: 0
COUGH: 0
SORE THROAT: 0
SHORTNESS OF BREATH: 0
NAUSEA: 0

## 2023-11-20 DIAGNOSIS — E66.01 SEVERE OBESITY (BMI 35.0-39.9) WITH COMORBIDITY (HCC): ICD-10-CM

## 2023-11-20 DIAGNOSIS — R73.03 PRE-DIABETES: ICD-10-CM

## 2023-11-20 DIAGNOSIS — E55.9 VITAMIN D DEFICIENCY: ICD-10-CM

## 2023-11-20 DIAGNOSIS — Z83.49 FAMILY HISTORY OF THYROID DISORDER: ICD-10-CM

## 2023-11-20 DIAGNOSIS — E78.00 PURE HYPERCHOLESTEROLEMIA: ICD-10-CM

## 2023-11-21 LAB
25(OH)D3 SERPL-MCNC: 40.1 NG/ML
ALBUMIN SERPL-MCNC: 4.1 G/DL (ref 3.4–5)
ALBUMIN/GLOB SERPL: 2 {RATIO} (ref 1.1–2.2)
ALP SERPL-CCNC: 76 U/L (ref 40–129)
ALT SERPL-CCNC: 14 U/L (ref 10–40)
ANION GAP SERPL CALCULATED.3IONS-SCNC: 11 MMOL/L (ref 3–16)
AST SERPL-CCNC: 20 U/L (ref 15–37)
BILIRUB SERPL-MCNC: 0.3 MG/DL (ref 0–1)
BUN SERPL-MCNC: 11 MG/DL (ref 7–20)
CALCIUM SERPL-MCNC: 9.3 MG/DL (ref 8.3–10.6)
CHLORIDE SERPL-SCNC: 103 MMOL/L (ref 99–110)
CHOLEST SERPL-MCNC: 164 MG/DL (ref 0–199)
CO2 SERPL-SCNC: 27 MMOL/L (ref 21–32)
CREAT SERPL-MCNC: 0.8 MG/DL (ref 0.6–1.2)
DEPRECATED RDW RBC AUTO: 12.6 % (ref 12.4–15.4)
EST. AVERAGE GLUCOSE BLD GHB EST-MCNC: 128.4 MG/DL
GFR SERPLBLD CREATININE-BSD FMLA CKD-EPI: >60 ML/MIN/{1.73_M2}
GLUCOSE SERPL-MCNC: 108 MG/DL (ref 70–99)
HBA1C MFR BLD: 6.1 %
HCT VFR BLD AUTO: 39.7 % (ref 36–48)
HDLC SERPL-MCNC: 57 MG/DL (ref 40–60)
HGB BLD-MCNC: 13.2 G/DL (ref 12–16)
LDLC SERPL CALC-MCNC: 93 MG/DL
MCH RBC QN AUTO: 30.7 PG (ref 26–34)
MCHC RBC AUTO-ENTMCNC: 33.3 G/DL (ref 31–36)
MCV RBC AUTO: 92.2 FL (ref 80–100)
PLATELET # BLD AUTO: 250 K/UL (ref 135–450)
PMV BLD AUTO: 9.7 FL (ref 5–10.5)
POTASSIUM SERPL-SCNC: 4.8 MMOL/L (ref 3.5–5.1)
PROT SERPL-MCNC: 6.2 G/DL (ref 6.4–8.2)
RBC # BLD AUTO: 4.3 M/UL (ref 4–5.2)
SODIUM SERPL-SCNC: 141 MMOL/L (ref 136–145)
TRIGL SERPL-MCNC: 68 MG/DL (ref 0–150)
TSH SERPL DL<=0.005 MIU/L-ACNC: 1.72 UIU/ML (ref 0.27–4.2)
VLDLC SERPL CALC-MCNC: 14 MG/DL
WBC # BLD AUTO: 4.7 K/UL (ref 4–11)

## 2023-11-22 ENCOUNTER — TELEMEDICINE (OUTPATIENT)
Dept: FAMILY MEDICINE CLINIC | Age: 68
End: 2023-11-22

## 2023-11-22 DIAGNOSIS — Z00.00 MEDICARE ANNUAL WELLNESS VISIT, SUBSEQUENT: Primary | ICD-10-CM

## 2023-11-22 ASSESSMENT — LIFESTYLE VARIABLES
HOW OFTEN DO YOU HAVE A DRINK CONTAINING ALCOHOL: MONTHLY OR LESS
HOW MANY STANDARD DRINKS CONTAINING ALCOHOL DO YOU HAVE ON A TYPICAL DAY: 1 OR 2

## 2023-11-22 ASSESSMENT — PATIENT HEALTH QUESTIONNAIRE - PHQ9
5. POOR APPETITE OR OVEREATING: 0
1. LITTLE INTEREST OR PLEASURE IN DOING THINGS: 0
10. IF YOU CHECKED OFF ANY PROBLEMS, HOW DIFFICULT HAVE THESE PROBLEMS MADE IT FOR YOU TO DO YOUR WORK, TAKE CARE OF THINGS AT HOME, OR GET ALONG WITH OTHER PEOPLE: 0
SUM OF ALL RESPONSES TO PHQ QUESTIONS 1-9: 0
7. TROUBLE CONCENTRATING ON THINGS, SUCH AS READING THE NEWSPAPER OR WATCHING TELEVISION: 0
SUM OF ALL RESPONSES TO PHQ QUESTIONS 1-9: 0
6. FEELING BAD ABOUT YOURSELF - OR THAT YOU ARE A FAILURE OR HAVE LET YOURSELF OR YOUR FAMILY DOWN: 0
SUM OF ALL RESPONSES TO PHQ9 QUESTIONS 1 & 2: 0
3. TROUBLE FALLING OR STAYING ASLEEP: 0
2. FEELING DOWN, DEPRESSED OR HOPELESS: 0
9. THOUGHTS THAT YOU WOULD BE BETTER OFF DEAD, OR OF HURTING YOURSELF: 0
SUM OF ALL RESPONSES TO PHQ QUESTIONS 1-9: 0
4. FEELING TIRED OR HAVING LITTLE ENERGY: 0
SUM OF ALL RESPONSES TO PHQ QUESTIONS 1-9: 0
8. MOVING OR SPEAKING SO SLOWLY THAT OTHER PEOPLE COULD HAVE NOTICED. OR THE OPPOSITE, BEING SO FIGETY OR RESTLESS THAT YOU HAVE BEEN MOVING AROUND A LOT MORE THAN USUAL: 0

## 2023-11-22 NOTE — PROGRESS NOTES
Medicare Annual Wellness Visit    Yordan Garsia is here for Medicare AWV    Assessment & Plan   Medicare annual wellness visit, subsequent    Recommendations for Preventive Services Due: see orders and patient instructions/AVS.  Recommended screening schedule for the next 5-10 years is provided to the patient in written form: see Patient Instructions/AVS.     Return in about 1 year (around 11/22/2024) for AWV. Subjective   The following acute and/or chronic problems were also addressed today:  Reviewed blood work done earlier. All normal aside from low protein and stable a1c of 6.1  Discussed adding protein to each meal and adding protein shakes as meal supplements. I recommend reducing carbohydrate intake (sweets, sodas, pastas, potatoes, chips, crackers). Patient's complete Health Risk Assessment and screening values have been reviewed and are found in Flowsheets. The following problems were reviewed today and where indicated follow up appointments were made and/or referrals ordered. Positive Risk Factor Screenings with Interventions:                 Weight and Activity:  Physical Activity: Sufficiently Active (11/22/2023)    Exercise Vital Sign     Days of Exercise per Week: 5 days     Minutes of Exercise per Session: 150+ min     On average, how many days per week do you engage in moderate to strenuous exercise (like a brisk walk)?: 5 days  Have you lost any weight without trying in the past 3 months?: No  There is no height or weight on file to calculate BMI.  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

## 2024-01-01 ENCOUNTER — PREP FOR PROCEDURE (OUTPATIENT)
Dept: SURGERY | Age: 69
End: 2024-01-01

## 2024-01-01 DIAGNOSIS — C23 GALLBLADDER CARCINOMA (HCC): ICD-10-CM

## 2024-01-01 DIAGNOSIS — C18.2 COLON CANCER, ASCENDING (HCC): ICD-10-CM

## 2024-02-12 NOTE — PROGRESS NOTES
New Patient [address] [place] [address] Drive Surgery Asha Neal [place] [place], [address] [facility], [address] [facility], [facility]  Phone: [phone]  Fax: [address]   YOB: 1955    Date of Visit:  2021    PRANAV Block CNP    HPI:   Colon Cancer: Patient is 60y.o. year old female seen at request of PRANAV Krishna CNP. Patient presents for evaluation of colon cancer involving the ascending colon. The tumor was identified colonoscopy. Evaluation was prompted by screening for colon cancer. The tumor was biopsied and histology showed a poorly differentiated tumor. Currently the patient has occasional mild abdominal pain, located in the RLQ without radiation. Patient complains of  . Patient denies bloody stools, loss of appetite and weight loss. There is not a family history of colon cancer.     Allergies   Allergen Reactions    Novocain [Procaine]      Outpatient Medications Marked as Taking for the 21 encounter (Initial consult) with Elissa Kimble MD   Medication Sig Dispense Refill    ferrous sulfate (IRON 325) 325 (65 Fe) MG tablet Take 1 tablet by mouth daily (with breakfast) 90 tablet 1       Past Medical History:   Diagnosis Date    Anemia     History of scarlet fever      Past Surgical History:   Procedure Laterality Date     SECTION      COLONOSCOPY N/A 2021    COLONOSCOPY WITH BIOPSY performed by Vicki Almaguer MD at [facility]  2021    COLONOSCOPY SUBMUCOSAL TATTOO INJECTION performed by Vicki Almaguer MD at [facility] UPPER GASTROINTESTINAL ENDOSCOPY N/A 2021    EGD BIOPSY performed by Vicki Almaguer MD at [address]     Family History   Problem Relation Age of Onset    High Blood Pressure Mother     Diabetes Father     Cancer Brother      Social History     Socioeconomic History  Marital status:      Spouse name: Not on file    Number of children: 2    Years of education: Not on file    Highest education level: Not on file   Occupational History    Not on file   Social Needs    Financial resource strain: Not very hard    Food insecurity     Worry: Never true     Inability: Never true    Transportation needs     Medical: No     Non-medical: No   Tobacco Use    Smoking status: Former Smoker     Packs/day: 2.00     Years: 30.00     Pack years: 60.00     Start date: 1972     Quit date: 2012     Years since quittin.0    Smokeless tobacco: Never Used   Substance and Sexual Activity    Alcohol use: Yes     Alcohol/week: 1.0 standard drinks     Types: 1 Cans of beer per week     Frequency: Monthly or less    Drug use: Never    Sexual activity: Not Currently   Lifestyle    Physical activity     Days per week: 5 days     Minutes per session: 30 min    Stress: To some extent   Relationships    Social connections     Talks on phone: Not on file     Gets together: Not on file     Attends Holiness service: Not on file     Active member of club or organization: Not on file     Attends meetings of clubs or organizations: Not on file     Relationship status: Not on file    Intimate partner violence     Fear of current or ex partner: Not on file     Emotionally abused: Not on file     Physically abused: Not on file     Forced sexual activity: Not on file   Other Topics Concern    Not on file   Social History Narrative    Not on file          Vitals:    21 1506   BP: 137/70   Site: Right Wrist   Position: Sitting   Cuff Size: Medium Adult   Pulse: 111   Temp: 96.9 °F (36.1 °C)   Weight: 176 lb 9.6 oz (80.1 kg)   Height: 5' 4.02\" (1.626 m)     Body mass index is 30.3 kg/m².      Wt Readings from Last 3 Encounters:   21 176 lb 9.6 oz (80.1 kg)   21 171 lb (77.6 kg)   21 177 lb 2 oz (80.3 kg)     BP Readings from Last 3 Encounters: 02/02/21 137/70   01/27/21 (!) 149/56   01/26/21 (!) 160/80          REVIEW OF SYSTEMS:   · All other systems reviewed; please refer to HPI with pertinent positives, all other ROS are negative    PHYSICAL EXAM:    CONSTITUTIONAL:  awake, alert, no apparent distress and normal weight  ENT:  normocepalic, without obvious abnormality  NECK:  supple, symmetrical, trachea midline   LUNGS:  Resp easy and unlabored  CARDIOVASCULAR:  regular rate and rhythm  ABDOMEN:  no scars, soft, non-distended, non-tender, involuntary guarding absent, and fullness in RLQ  MUSCULOSKELETAL: No edema  NEUROLOGIC:  Mental Status Exam:  Level of Alertness:   awake  Orientation:   person, place, time        DATA:  Radiology Review:    CT OF THE CHEST, ABDOMEN, AND PELVIS WITH CONTRAST 2/2/2021 7:37 am       TECHNIQUE:   CT of the chest, abdomen and pelvis was performed with the administration of   intravenous contrast. Multiplanar reformatted images are provided for review. Dose modulation, iterative reconstruction, and/or weight based adjustment of   the mA/kV was utilized to reduce the radiation dose to as low as reasonably   achievable.       COMPARISON:   None       HISTORY:   ORDERING SYSTEM PROVIDED HISTORY: Colonic mass   TECHNOLOGIST PROVIDED HISTORY:   Additional Contrast?->Oral   Reason for exam:->large right colon mass on colonoscopy, please assess for   metastases with CT chest, abdomen and pelvis   Reason for Exam: large rt colon mass on colonoscopy-colonoscopy performed on   1/27/2021 , anemia; pt states she wasn't having any issues besides   anemia-went in for physical and was told it was time for a colonoscopy   Acuity: Acute   Type of Exam: Initial   Relevant Medical/Surgical History: see epic       FINDINGS:       Chest:       Mediastinum: The central airways are patent.  There is no hilar or   mediastinal adenopathy.       Lungs/pleura: There is no lobar consolidation or suspicious parenchymal mass. The pleural spaces are clear.       Soft Tissues/Bones: There is no acute fracture or aggressive osseous lesion.           Abdomen/Pelvis:       Organs: The liver, pancreas, spleen, kidneys and adrenals are unremarkable   aside from a benign 5.4 cm simple left renal cyst.       GI/Bowel: There is a large annular constricting mass involving the cecum and   ascending colon.  This spans a length of approximately 11 cm with greatest   thickness of 2.4 cm.  There is no bowel dilatation or obstruction.       Pelvis: The pelvic organs and urinary bladder are unremarkable.       Peritoneum/Retroperitoneum: There are multiple mildly enlarged lymph nodes   within the right lower quadrant mesentery ranging in size up to 1.3 cm in   diameter.       Bones/Soft Tissues: There is no acute fracture or aggressive osseous lesion.           Impression   Large right-sided colonic carcinoma with associated right lower quadrant   mesenteric lymph node involvement.  Negative for distant metastatic disease.                   ASSESSMENT:     Diagnosis Orders   1. Malignant neoplasm of ascending colon (HCC)           PLAN:    We will schedule the pt for a open right colectomy. The technical aspects, risks, benefits and complications of the procedure were discussed with the patient. The pt appears to understand, asks appropriate questions, and agrees to proceed with the procedure.        [facility] Atypical chest pain with exertional dyspnea  - ECG non ischemic, tachycardia with PVCs, TTE from 2022 normal LV function  - Trops 16 &19  - Cardiology plansnoted- repeat Echo ordered Atypical chest pain with exertional dyspnea  - ECG non ischemic, tachycardia with PVCs, TTE from 2022 normal LV function. Low suspicion for ACS  - Trops 16 &19  - Cardiology plans noted- repeat Echo ordered

## 2024-02-20 ENCOUNTER — HOSPITAL ENCOUNTER (OUTPATIENT)
Dept: WOMENS IMAGING | Age: 69
Discharge: HOME | End: 2024-02-20
Payer: MEDICARE

## 2024-02-20 DIAGNOSIS — Z12.31 SCREENING MAMMOGRAM, ENCOUNTER FOR: ICD-10-CM

## 2024-02-20 PROCEDURE — 77063 BREAST TOMOSYNTHESIS BI: CPT

## 2024-03-11 ENCOUNTER — HOSPITAL ENCOUNTER (OUTPATIENT)
Dept: CT IMAGING | Age: 69
Discharge: HOME | End: 2024-03-11
Payer: MEDICARE

## 2024-03-11 DIAGNOSIS — C18.2 MALIGNANT NEOPLASM OF ASCENDING COLON (HCC): ICD-10-CM

## 2024-03-11 LAB
PERFORMED ON: NORMAL
POC CREATININE: 1 MG/DL (ref 0.6–1.2)
POC SAMPLE TYPE: NORMAL

## 2024-03-11 PROCEDURE — 74177 CT ABD & PELVIS W/CONTRAST: CPT

## 2024-03-11 PROCEDURE — 6360000004 HC RX CONTRAST MEDICATION: Performed by: NURSE PRACTITIONER

## 2024-03-11 PROCEDURE — 82565 ASSAY OF CREATININE: CPT

## 2024-03-11 RX ADMIN — IOPAMIDOL 75 ML: 755 INJECTION, SOLUTION INTRAVENOUS at 08:29

## 2024-03-11 RX ADMIN — DIATRIZOATE MEGLUMINE AND DIATRIZOATE SODIUM 12 ML: 660; 100 LIQUID ORAL; RECTAL at 08:29

## 2024-08-01 ENCOUNTER — TELEPHONE (OUTPATIENT)
Dept: FAMILY MEDICINE CLINIC | Age: 69
End: 2024-08-01

## 2024-08-01 NOTE — TELEPHONE ENCOUNTER
ENTRY FOR [facility] MAMMOGRAM RAFFLE    Completion of mammogram before Oct 31st equals 1 entry. Completion same day as order/visit with EFM will equal 2 entries.    Mammogram Completion date: 2/20/24    Mammogram Order date:     Ordering provider: judy TAYLOR TIX #: [id number]      [facility] Raffle will be held on Friday Nov 1st.  4 winners will be selected. (One from each office POD)  If chosen office staff will contact patient to coordinate raffle basket collection.

## 2024-10-14 ENCOUNTER — HOSPITAL ENCOUNTER (EMERGENCY)
Age: 69
Discharge: HOME | End: 2024-10-14
Payer: MEDICARE

## 2024-10-14 ENCOUNTER — APPOINTMENT (OUTPATIENT)
Dept: CT IMAGING | Age: 69
End: 2024-10-14
Payer: MEDICARE

## 2024-10-14 VITALS
OXYGEN SATURATION: 99 % | HEIGHT: 64 IN | WEIGHT: 194 LBS | DIASTOLIC BLOOD PRESSURE: 90 MMHG | TEMPERATURE: 98.3 F | RESPIRATION RATE: 18 BRPM | BODY MASS INDEX: 33.12 KG/M2 | SYSTOLIC BLOOD PRESSURE: 173 MMHG | HEART RATE: 100 BPM

## 2024-10-14 DIAGNOSIS — K81.9 CHOLECYSTITIS: ICD-10-CM

## 2024-10-14 DIAGNOSIS — N39.0 URINARY TRACT INFECTION WITHOUT HEMATURIA, SITE UNSPECIFIED: ICD-10-CM

## 2024-10-14 DIAGNOSIS — C79.9 METASTATIC MALIGNANT NEOPLASM, UNSPECIFIED SITE (HCC): Primary | ICD-10-CM

## 2024-10-14 LAB
ALBUMIN SERPL-MCNC: 3.6 G/DL (ref 3.4–5)
ALBUMIN/GLOB SERPL: 1 {RATIO} (ref 1.1–2.2)
ALP SERPL-CCNC: 421 U/L (ref 40–129)
ALT SERPL-CCNC: 234 U/L (ref 10–40)
ANION GAP SERPL CALCULATED.3IONS-SCNC: 11 MMOL/L (ref 3–16)
AST SERPL-CCNC: 124 U/L (ref 15–37)
BACTERIA URNS QL MICRO: ABNORMAL /HPF
BASOPHILS # BLD: 0 K/UL (ref 0–0.2)
BASOPHILS NFR BLD: 0.2 %
BILIRUB SERPL-MCNC: 3.3 MG/DL (ref 0–1)
BILIRUB UR QL STRIP.AUTO: ABNORMAL
BUN SERPL-MCNC: 10 MG/DL (ref 7–20)
CALCIUM SERPL-MCNC: 9.3 MG/DL (ref 8.3–10.6)
CHLORIDE SERPL-SCNC: 102 MMOL/L (ref 99–110)
CLARITY UR: ABNORMAL
CO2 SERPL-SCNC: 27 MMOL/L (ref 21–32)
COLOR UR: ABNORMAL
CREAT SERPL-MCNC: 0.9 MG/DL (ref 0.6–1.2)
CRYSTALS URNS MICRO: ABNORMAL /HPF
DEPRECATED RDW RBC AUTO: 13.1 % (ref 12.4–15.4)
EOSINOPHIL # BLD: 0.2 K/UL (ref 0–0.6)
EOSINOPHIL NFR BLD: 1.7 %
EPI CELLS #/AREA URNS HPF: ABNORMAL /HPF (ref 0–5)
GFR SERPLBLD CREATININE-BSD FMLA CKD-EPI: 70 ML/MIN/{1.73_M2}
GLUCOSE SERPL-MCNC: 143 MG/DL (ref 70–99)
GLUCOSE UR STRIP.AUTO-MCNC: NEGATIVE MG/DL
HCT VFR BLD AUTO: 30.8 % (ref 36–48)
HGB BLD-MCNC: 10 G/DL (ref 12–16)
HGB UR QL STRIP.AUTO: NEGATIVE
KETONES UR STRIP.AUTO-MCNC: 15 MG/DL
LEUKOCYTE ESTERASE UR QL STRIP.AUTO: ABNORMAL
LIPASE SERPL-CCNC: 19 U/L (ref 13–60)
LYMPHOCYTES # BLD: 1.2 K/UL (ref 1–5.1)
LYMPHOCYTES NFR BLD: 7.9 %
MCH RBC QN AUTO: 29.4 PG (ref 26–34)
MCHC RBC AUTO-ENTMCNC: 32.5 G/DL (ref 31–36)
MCV RBC AUTO: 90.4 FL (ref 80–100)
MONOCYTES # BLD: 1.4 K/UL (ref 0–1.3)
MONOCYTES NFR BLD: 9.6 %
NEUTROPHILS # BLD: 11.8 K/UL (ref 1.7–7.7)
NEUTROPHILS NFR BLD: 80.6 %
NITRITE UR QL STRIP.AUTO: NEGATIVE
PH UR STRIP.AUTO: 6 [PH] (ref 5–8)
PLATELET # BLD AUTO: 374 K/UL (ref 135–450)
PMV BLD AUTO: 7.9 FL (ref 5–10.5)
POTASSIUM SERPL-SCNC: 3.6 MMOL/L (ref 3.5–5.1)
PROT SERPL-MCNC: 7.1 G/DL (ref 6.4–8.2)
PROT UR STRIP.AUTO-MCNC: ABNORMAL MG/DL
RBC # BLD AUTO: 3.4 M/UL (ref 4–5.2)
RBC #/AREA URNS HPF: ABNORMAL /HPF (ref 0–4)
RENAL EPI CELLS #/AREA UR COMP ASSIST: ABNORMAL /HPF (ref 0–1)
SODIUM SERPL-SCNC: 140 MMOL/L (ref 136–145)
SP GR UR STRIP.AUTO: 1.02 (ref 1–1.03)
UA COMPLETE W REFLEX CULTURE PNL UR: YES
UA DIPSTICK W REFLEX MICRO PNL UR: YES
URN SPEC COLLECT METH UR: ABNORMAL
UROBILINOGEN UR STRIP-ACNC: 4 E.U./DL
WBC # BLD AUTO: 14.7 K/UL (ref 4–11)
WBC #/AREA URNS HPF: >100 /HPF (ref 0–5)

## 2024-10-14 PROCEDURE — 80053 COMPREHEN METABOLIC PANEL: CPT

## 2024-10-14 PROCEDURE — 81001 URINALYSIS AUTO W/SCOPE: CPT

## 2024-10-14 PROCEDURE — 6360000004 HC RX CONTRAST MEDICATION: Performed by: NURSE PRACTITIONER

## 2024-10-14 PROCEDURE — 6370000000 HC RX 637 (ALT 250 FOR IP): Performed by: NURSE PRACTITIONER

## 2024-10-14 PROCEDURE — 99285 EMERGENCY DEPT VISIT HI MDM: CPT

## 2024-10-14 PROCEDURE — 85025 COMPLETE CBC W/AUTO DIFF WBC: CPT

## 2024-10-14 PROCEDURE — 87086 URINE CULTURE/COLONY COUNT: CPT

## 2024-10-14 PROCEDURE — 83690 ASSAY OF LIPASE: CPT

## 2024-10-14 PROCEDURE — 71260 CT THORAX DX C+: CPT

## 2024-10-14 RX ORDER — METRONIDAZOLE 500 MG/1
500 TABLET ORAL 3 TIMES DAILY
Qty: 21 TABLET | Refills: 0 | Status: SHIPPED | OUTPATIENT
Start: 2024-10-14 | End: 2024-10-21

## 2024-10-14 RX ORDER — HYDROCODONE BITARTRATE AND ACETAMINOPHEN 5; 325 MG/1; MG/1
1 TABLET ORAL ONCE
Status: COMPLETED | OUTPATIENT
Start: 2024-10-14 | End: 2024-10-14

## 2024-10-14 RX ORDER — CIPROFLOXACIN 500 MG/1
500 TABLET, FILM COATED ORAL 2 TIMES DAILY
Qty: 14 TABLET | Refills: 0 | Status: SHIPPED | OUTPATIENT
Start: 2024-10-14 | End: 2024-10-21

## 2024-10-14 RX ORDER — HYDROCODONE BITARTRATE AND ACETAMINOPHEN 5; 325 MG/1; MG/1
1 TABLET ORAL EVERY 6 HOURS PRN
Qty: 12 TABLET | Refills: 0 | Status: SHIPPED | OUTPATIENT
Start: 2024-10-14 | End: 2024-10-17

## 2024-10-14 RX ORDER — IOPAMIDOL 755 MG/ML
75 INJECTION, SOLUTION INTRAVASCULAR
Status: COMPLETED | OUTPATIENT
Start: 2024-10-14 | End: 2024-10-14

## 2024-10-14 RX ORDER — METRONIDAZOLE 250 MG/1
500 TABLET ORAL ONCE
Status: COMPLETED | OUTPATIENT
Start: 2024-10-14 | End: 2024-10-14

## 2024-10-14 RX ORDER — CIPROFLOXACIN 500 MG/1
500 TABLET, FILM COATED ORAL ONCE
Status: COMPLETED | OUTPATIENT
Start: 2024-10-14 | End: 2024-10-14

## 2024-10-14 RX ADMIN — CIPROFLOXACIN 500 MG: 500 TABLET, FILM COATED ORAL at 22:36

## 2024-10-14 RX ADMIN — METRONIDAZOLE 500 MG: 250 TABLET ORAL at 22:36

## 2024-10-14 RX ADMIN — IOPAMIDOL 75 ML: 755 INJECTION, SOLUTION INTRAVENOUS at 20:24

## 2024-10-14 RX ADMIN — HYDROCODONE BITARTRATE AND ACETAMINOPHEN 1 TABLET: 5; 325 TABLET ORAL at 22:36

## 2024-10-14 ASSESSMENT — PAIN - FUNCTIONAL ASSESSMENT: PAIN_FUNCTIONAL_ASSESSMENT: 0-10

## 2024-10-14 ASSESSMENT — PAIN SCALES - GENERAL: PAINLEVEL_OUTOF10: 4

## 2024-10-15 ENCOUNTER — TELEPHONE (OUTPATIENT)
Dept: FAMILY MEDICINE CLINIC | Age: 69
End: 2024-10-15

## 2024-10-15 LAB — BACTERIA UR CULT: NORMAL

## 2024-10-15 NOTE — TELEPHONE ENCOUNTER
ED Follow Up Call/ Schedule appt   ED: [facility]  Reason: metastatic malignant neoplasm  Date:10/14/2024    Appt scheduled:       Comments:   Spoke with patient she talked to Dr. Goncalves this morning ordering a biopsy of liver.   She stated that she feels sick to her stomach.   She stated that this morning she actually got sick to her stomach and had emesis this morning.     She stated that she will follow up with Dr. Goncalves for now and will call if anything changes.     Future Appointments   Date Time Provider Department Center   11/27/2024  8:00 AM Luisana Gunter PA [facility] DEP

## 2024-10-16 NOTE — ED PROVIDER NOTES
admission to the hospital with the patient given her UTI as well as possible early cholecystitis, patient did want a be discharged home so I did go ahead and start her on some oral antibiotics, I did recommend she call her oncologist tomorrow for follow-up, I also referred her to see general surgery, she was given strict return precautions should symptoms worsen or persist.  Patient agreeable with this plan.  She was discharged in good condition.          I am the Primary Clinician of Record.  FINAL IMPRESSION      1. Metastatic malignant neoplasm, unspecified site (HCC)    2. Urinary tract infection without hematuria, site unspecified    3. Cholecystitis          DISPOSITION/PLAN     DISPOSITION Decision to Discharge    PATIENT REFERRED TO:  Aron Goncalves MD  [address]  [address]  [facility] [id number] [phone]    Call   For follow up    Cooney, Yovani Alexandre MD  [address]  [address]  [facility] [address]  [phone]    Call   For follow up      DISCHARGE MEDICATIONS:  Discharge Medication List as of 10/14/2024 10:59 PM        START taking these medications    Details   ciprofloxacin (CIPRO) 500 MG tablet Take 1 tablet by mouth 2 times daily for 7 days, Disp-14 tablet, R-0Normal      metroNIDAZOLE (FLAGYL) 500 MG tablet Take 1 tablet by mouth 3 times daily for 7 days, Disp-21 tablet, R-0Normal      HYDROcodone-acetaminophen (NORCO) 5-325 MG per tablet Take 1 tablet by mouth every 6 hours as needed for Pain for up to 3 days. Intended supply: 3 days. Take lowest dose possible to manage pain Max Daily Amount: 4 tablets, Disp-12 tablet, R-0Normal             DISCONTINUED MEDICATIONS:  Discharge Medication List as of 10/14/2024 10:59 PM                 (Please note that portions of this note were completed with a voice recognition program.  Efforts were made to edit the dictations but occasionally words are mis-transcribed.)    PRANAV Salamanca - CNP (electronically signed)       Suhas Llamas APRN -

## 2024-10-18 ENCOUNTER — HOSPITAL ENCOUNTER (OUTPATIENT)
Dept: CT IMAGING | Age: 69
Discharge: HOME | End: 2024-10-18
Payer: MEDICARE

## 2024-10-18 VITALS
RESPIRATION RATE: 16 BRPM | SYSTOLIC BLOOD PRESSURE: 176 MMHG | DIASTOLIC BLOOD PRESSURE: 88 MMHG | HEIGHT: 64 IN | OXYGEN SATURATION: 98 % | TEMPERATURE: 97.6 F | HEART RATE: 77 BPM | WEIGHT: 189 LBS | BODY MASS INDEX: 32.27 KG/M2

## 2024-10-18 DIAGNOSIS — C18.2 MALIGNANT NEOPLASM OF ASCENDING COLON (HCC): ICD-10-CM

## 2024-10-18 LAB
DEPRECATED RDW RBC AUTO: 13.4 % (ref 12.4–15.4)
HCT VFR BLD AUTO: 33.4 % (ref 36–48)
HGB BLD-MCNC: 11.3 G/DL (ref 12–16)
INR PPP: 1.06 (ref 0.85–1.15)
MCH RBC QN AUTO: 30.3 PG (ref 26–34)
MCHC RBC AUTO-ENTMCNC: 33.7 G/DL (ref 31–36)
MCV RBC AUTO: 89.8 FL (ref 80–100)
PLATELET # BLD AUTO: 405 K/UL (ref 135–450)
PMV BLD AUTO: 7.9 FL (ref 5–10.5)
PROTHROMBIN TIME: 14 SEC (ref 11.9–14.9)
RBC # BLD AUTO: 3.72 M/UL (ref 4–5.2)
WBC # BLD AUTO: 12.8 K/UL (ref 4–11)

## 2024-10-18 PROCEDURE — 36415 COLL VENOUS BLD VENIPUNCTURE: CPT

## 2024-10-18 PROCEDURE — 2709999900 CT NEEDLE BIOPSY LIVER PERCUTANEOUS

## 2024-10-18 PROCEDURE — 6360000002 HC RX W HCPCS: Performed by: RADIOLOGY

## 2024-10-18 PROCEDURE — 85027 COMPLETE CBC AUTOMATED: CPT

## 2024-10-18 PROCEDURE — 85610 PROTHROMBIN TIME: CPT

## 2024-10-18 RX ORDER — HYDROCODONE BITARTRATE AND ACETAMINOPHEN 5; 325 MG/1; MG/1
1 TABLET ORAL EVERY 6 HOURS PRN
Status: DISCONTINUED | OUTPATIENT
Start: 2024-10-18 | End: 2024-10-19 | Stop reason: HOSPADM

## 2024-10-18 RX ORDER — SODIUM CHLORIDE 9 MG/ML
INJECTION, SOLUTION INTRAVENOUS PRN
Status: DISCONTINUED | OUTPATIENT
Start: 2024-10-18 | End: 2024-10-19 | Stop reason: HOSPADM

## 2024-10-18 RX ORDER — SODIUM CHLORIDE 0.9 % (FLUSH) 0.9 %
5-40 SYRINGE (ML) INJECTION EVERY 12 HOURS SCHEDULED
Status: DISCONTINUED | OUTPATIENT
Start: 2024-10-18 | End: 2024-10-19 | Stop reason: HOSPADM

## 2024-10-18 RX ORDER — FENTANYL CITRATE 50 UG/ML
INJECTION, SOLUTION INTRAMUSCULAR; INTRAVENOUS PRN
Status: COMPLETED | OUTPATIENT
Start: 2024-10-18 | End: 2024-10-18

## 2024-10-18 RX ORDER — SODIUM CHLORIDE 0.9 % (FLUSH) 0.9 %
5-40 SYRINGE (ML) INJECTION PRN
Status: DISCONTINUED | OUTPATIENT
Start: 2024-10-18 | End: 2024-10-19 | Stop reason: HOSPADM

## 2024-10-18 RX ORDER — MIDAZOLAM HYDROCHLORIDE 5 MG/ML
INJECTION INTRAMUSCULAR; INTRAVENOUS PRN
Status: COMPLETED | OUTPATIENT
Start: 2024-10-18 | End: 2024-10-18

## 2024-10-18 RX ADMIN — MIDAZOLAM HYDROCHLORIDE 1 MG: 5 INJECTION, SOLUTION INTRAMUSCULAR; INTRAVENOUS at 09:03

## 2024-10-18 RX ADMIN — FENTANYL CITRATE 50 MCG: 50 INJECTION INTRAMUSCULAR; INTRAVENOUS at 09:03

## 2024-10-18 ASSESSMENT — PAIN SCALES - GENERAL
PAINLEVEL_OUTOF10: 0
PAINLEVEL_OUTOF10: 0

## 2024-10-18 ASSESSMENT — PAIN - FUNCTIONAL ASSESSMENT
PAIN_FUNCTIONAL_ASSESSMENT: ACTIVITIES ARE NOT PREVENTED
PAIN_FUNCTIONAL_ASSESSMENT: 0-10
PAIN_FUNCTIONAL_ASSESSMENT: 0-10

## 2024-10-18 NOTE — OR NURSING
First biopsy obtained at this time.    
Image guided liver biopsy biopsy completed by Dr. Mccullough. Pt tolerated procedure without any signs or symptoms of distress. Vital signs stable. Report given  to BRENT RN. Pt transported back to [facility] in stable condition via stretcher.     Total Biopsy: 2  Received: Versed: 1 mg       Fentanyl: 50 mcg  Bandage to right anterior ABD that is clean dry and intact.   Patient to lay on right side for 1 hour.     Vital Signs  Vitals:    10/18/24 0912   BP: (!) 155/63   Pulse: 93   Resp: 22   Temp:    SpO2: 100%    (vital signs in table format)    Post Katia  2 - Able to move 4 extremities voluntarily on command  2 - BP+/- 20mmHg of normal  2 - Fully awake  2 - Able to maintain oxygen saturation >92% on room air  2 - Able to breathe deeply and cough freely       This RN wasted the following with ASchamer.  1 mg Versed  50 mcg Fentanyl   
Pt arrived for image guided liver biopsy right. Procedure explained including the risk and benefits of the procedure. All questions answered. Pt verbalizes understanding of the of procedure and states no more questions. Consent signed. Vital signs stable, labs, allergies, medications, and code status reviewed. No contraindications noted.     Vitals:    10/18/24 0726   BP: (!) 172/72   Pulse: 94   Resp: 16   Temp: 96.9 °F (36.1 °C)   SpO2: 98%    (vital signs in table format)    Katia Score  2 - Able to move 4 extremities voluntarily on command  2 - BP+/- 20mmHg of normal  2 - Fully awake  2 - Able to maintain oxygen saturation >92% on room air  2 - Able to breathe deeply and cough freely    Allergies  Novocain [procaine]    Labs  Lab Results   Component Value Date    INR 1.06 10/18/2024    PROTIME 14.0 10/18/2024       Lab Results   Component Value Date    CREATININE 0.9 10/14/2024    BUN 10 10/14/2024     10/14/2024    K 3.6 10/14/2024     10/14/2024    CO2 27 10/14/2024       Lab Results   Component Value Date    WBC 12.8 (H) 10/18/2024    HGB 11.3 (L) 10/18/2024    HCT 33.4 (L) 10/18/2024    MCV 89.8 10/18/2024     10/18/2024          
Private car

## 2024-10-18 NOTE — PROGRESS NOTES
Pre-Operative:  1.  Patient/Caregiver identifies - states name and date of birth.  2.  The patient is free from signs and symptoms of injury.  3.  The patient receives appropriate medication(s), safely administered during the Perioperative period.  4.  The patient is free from signs and symptoms of infection.  5.  The patient has wound / tissue perfusion.  6.  The patients's fluid, electrolyte, and acid-base balances are established preoperatively.  7.  The patient's pulmonary function is established preoperatively.  8.  The patient's cardiovascular status is established preoperatively.  9.  The patient / caregiver demonstrates knowledge of nutritional management related to the operative or other invasive procedure.  10.  The patient/caregiver demonstrates knowledge of medication management.  11.  The patient/caregiver demonstrates knowledge of pain management.  12.  The patient participates in the rehabilitation process as applicable.  13.  The patient/caregiver participates in decisions affection his or her Perioperative plan of care.  14.  The patient's care is consistent with the individualized Perioperative plan of care.  15.  The patient's right to privacy is maintained.  16.  The patient is the recipient of competent and ethical care within legal standards of practice.  17.  The patient's value system, lifestyle, ethnicity, and culture are considered, respected, and incorporated in the Perioperative plan of care and understands special services available.  18.  The patient demonstrates and/or reports adequate pain control throughout the the Perioperative period.  19.  The patient's neurological status is established preoperatively.  20.  The patient/caregiver demonstrates knowledge of the expected responses to the operative or invasive procedure.  21.  Patient/Caregiver has reduced anxiety.  Interventions- Familiarize with environment and equipment.  22. Patient/Caregiver verbalizes understanding of Phase I 
Pt IV has been removed, discharge instructions have been read to pt and their responsible person, all questions have been answered. Pt belongings have been returned to them and pt is dressed. Pt is a/o in stable condition ready for discharge.     
Pt has been laying on her right side for 1 hour. Pt is now able to roll. Biopsy site has old drainage that is unchanged from first assessment. Pt is a/o in stable condition with no complaints.   
baseline levels established preoperatively.  19.  The patient/caregiver demonstrates knowledge of the expected responses to the operative or invasive procedure.  20.  Patient/Caregiver has reduced anxiety.  Interventions- Familiarize with environment and equipment.  21. Other:  22. Other:

## 2024-10-31 ENCOUNTER — APPOINTMENT (OUTPATIENT)
Dept: GENERAL RADIOLOGY | Age: 69
End: 2024-10-31
Payer: MEDICARE

## 2024-10-31 ENCOUNTER — HOSPITAL ENCOUNTER (OUTPATIENT)
Age: 69
Setting detail: OBSERVATION
Discharge: HOME | End: 2024-11-02
Attending: STUDENT IN AN ORGANIZED HEALTH CARE EDUCATION/TRAINING PROGRAM | Admitting: STUDENT IN AN ORGANIZED HEALTH CARE EDUCATION/TRAINING PROGRAM
Payer: MEDICARE

## 2024-10-31 DIAGNOSIS — C22.9 HEPATIC ADENOCARCINOMA (HCC): ICD-10-CM

## 2024-10-31 DIAGNOSIS — C22.9 MALIGNANT NEOPLASM OF LIVER, UNSPECIFIED LIVER MALIGNANCY TYPE (HCC): Primary | ICD-10-CM

## 2024-10-31 DIAGNOSIS — K83.8 INTRAHEPATIC BILE DUCT DILATION: ICD-10-CM

## 2024-10-31 DIAGNOSIS — R89.9 ABNORMAL LABORATORY TEST: ICD-10-CM

## 2024-10-31 DIAGNOSIS — K83.1 OBSTRUCTIVE JAUNDICE: ICD-10-CM

## 2024-10-31 PROBLEM — K76.9 LIVER LESION: Status: ACTIVE | Noted: 2024-10-31

## 2024-10-31 LAB
ALBUMIN SERPL-MCNC: 2.8 G/DL (ref 3.4–5)
ALBUMIN/GLOB SERPL: 0.9 {RATIO} (ref 1.1–2.2)
ALP SERPL-CCNC: 505 U/L (ref 40–129)
ALT SERPL-CCNC: 129 U/L (ref 10–40)
ANION GAP SERPL CALCULATED.3IONS-SCNC: 12 MMOL/L (ref 3–16)
ANISOCYTOSIS BLD QL SMEAR: ABNORMAL
AST SERPL-CCNC: 111 U/L (ref 15–37)
BACTERIA URNS QL MICRO: ABNORMAL /HPF
BASOPHILS # BLD: 0.2 K/UL (ref 0–0.2)
BASOPHILS NFR BLD: 1 %
BILIRUB SERPL-MCNC: 15.7 MG/DL (ref 0–1)
BILIRUB UR QL STRIP.AUTO: ABNORMAL
BUN SERPL-MCNC: 13 MG/DL (ref 7–20)
CALCIUM SERPL-MCNC: 9.6 MG/DL (ref 8.3–10.6)
CHLORIDE SERPL-SCNC: 100 MMOL/L (ref 99–110)
CLARITY UR: ABNORMAL
CO2 SERPL-SCNC: 25 MMOL/L (ref 21–32)
COLOR UR: ABNORMAL
CREAT SERPL-MCNC: 0.7 MG/DL (ref 0.6–1.2)
DACRYOCYTES BLD QL SMEAR: ABNORMAL
DEPRECATED RDW RBC AUTO: 17.4 % (ref 12.4–15.4)
EOSINOPHIL # BLD: 0.2 K/UL (ref 0–0.6)
EOSINOPHIL NFR BLD: 1 %
EPI CELLS #/AREA URNS HPF: ABNORMAL /HPF (ref 0–5)
GFR SERPLBLD CREATININE-BSD FMLA CKD-EPI: >90 ML/MIN/{1.73_M2}
GLUCOSE SERPL-MCNC: 141 MG/DL (ref 70–99)
GLUCOSE UR STRIP.AUTO-MCNC: 100 MG/DL
HCT VFR BLD AUTO: 25.2 % (ref 36–48)
HGB BLD-MCNC: 8.2 G/DL (ref 12–16)
HGB UR QL STRIP.AUTO: NEGATIVE
HYPOCHROMIA BLD QL SMEAR: ABNORMAL
KETONES UR STRIP.AUTO-MCNC: NEGATIVE MG/DL
LEUKOCYTE ESTERASE UR QL STRIP.AUTO: ABNORMAL
LYMPHOCYTES # BLD: 0 K/UL (ref 1–5.1)
LYMPHOCYTES NFR BLD: 0 %
MACROCYTES BLD QL SMEAR: ABNORMAL
MCH RBC QN AUTO: 29.4 PG (ref 26–34)
MCHC RBC AUTO-ENTMCNC: 32.7 G/DL (ref 31–36)
MCV RBC AUTO: 89.9 FL (ref 80–100)
MICROCYTES BLD QL SMEAR: ABNORMAL
MONOCYTES # BLD: 1.3 K/UL (ref 0–1.3)
MONOCYTES NFR BLD: 7 %
NEUTROPHILS # BLD: 16.7 K/UL (ref 1.7–7.7)
NEUTROPHILS NFR BLD: 77 %
NEUTS BAND NFR BLD MANUAL: 14 % (ref 0–7)
NITRITE UR QL STRIP.AUTO: NEGATIVE
OVALOCYTES BLD QL SMEAR: ABNORMAL
PH UR STRIP.AUTO: 6.5 [PH] (ref 5–8)
PLATELET # BLD AUTO: 460 K/UL (ref 135–450)
PMV BLD AUTO: 8.1 FL (ref 5–10.5)
POIKILOCYTOSIS BLD QL SMEAR: ABNORMAL
POLYCHROMASIA BLD QL SMEAR: ABNORMAL
POTASSIUM SERPL-SCNC: 3.5 MMOL/L (ref 3.5–5.1)
PROT SERPL-MCNC: 5.9 G/DL (ref 6.4–8.2)
PROT UR STRIP.AUTO-MCNC: ABNORMAL MG/DL
RBC # BLD AUTO: 2.81 M/UL (ref 4–5.2)
RBC #/AREA URNS HPF: ABNORMAL /HPF (ref 0–4)
SODIUM SERPL-SCNC: 137 MMOL/L (ref 136–145)
SP GR UR STRIP.AUTO: 1.01 (ref 1–1.03)
STOMATOCYTES BLD QL SMEAR: ABNORMAL
TARGETS BLD QL SMEAR: ABNORMAL
UA COMPLETE W REFLEX CULTURE PNL UR: ABNORMAL
UA DIPSTICK W REFLEX MICRO PNL UR: YES
URN SPEC COLLECT METH UR: ABNORMAL
UROBILINOGEN UR STRIP-ACNC: 0.2 E.U./DL
WBC # BLD AUTO: 18.4 K/UL (ref 4–11)
WBC #/AREA URNS HPF: ABNORMAL /HPF (ref 0–5)

## 2024-10-31 PROCEDURE — 96366 THER/PROPH/DIAG IV INF ADDON: CPT

## 2024-10-31 PROCEDURE — 93005 ELECTROCARDIOGRAM TRACING: CPT | Performed by: NURSE PRACTITIONER

## 2024-10-31 PROCEDURE — 80053 COMPREHEN METABOLIC PANEL: CPT

## 2024-10-31 PROCEDURE — 36415 COLL VENOUS BLD VENIPUNCTURE: CPT

## 2024-10-31 PROCEDURE — 81001 URINALYSIS AUTO W/SCOPE: CPT

## 2024-10-31 PROCEDURE — 87040 BLOOD CULTURE FOR BACTERIA: CPT

## 2024-10-31 PROCEDURE — 85025 COMPLETE CBC W/AUTO DIFF WBC: CPT

## 2024-10-31 PROCEDURE — G0378 HOSPITAL OBSERVATION PER HR: HCPCS

## 2024-10-31 PROCEDURE — 99285 EMERGENCY DEPT VISIT HI MDM: CPT

## 2024-10-31 PROCEDURE — 96365 THER/PROPH/DIAG IV INF INIT: CPT

## 2024-10-31 PROCEDURE — 2580000003 HC RX 258: Performed by: NURSE PRACTITIONER

## 2024-10-31 PROCEDURE — 2580000003 HC RX 258: Performed by: INTERNAL MEDICINE

## 2024-10-31 PROCEDURE — 71045 X-RAY EXAM CHEST 1 VIEW: CPT

## 2024-10-31 PROCEDURE — 6360000002 HC RX W HCPCS: Performed by: INTERNAL MEDICINE

## 2024-10-31 RX ORDER — ONDANSETRON 4 MG/1
4 TABLET, ORALLY DISINTEGRATING ORAL EVERY 8 HOURS PRN
Status: DISCONTINUED | OUTPATIENT
Start: 2024-10-31 | End: 2024-11-02 | Stop reason: HOSPADM

## 2024-10-31 RX ORDER — SODIUM CHLORIDE 0.9 % (FLUSH) 0.9 %
5-40 SYRINGE (ML) INJECTION PRN
Status: DISCONTINUED | OUTPATIENT
Start: 2024-10-31 | End: 2024-11-02 | Stop reason: HOSPADM

## 2024-10-31 RX ORDER — SODIUM CHLORIDE 9 MG/ML
INJECTION, SOLUTION INTRAVENOUS PRN
Status: DISCONTINUED | OUTPATIENT
Start: 2024-10-31 | End: 2024-11-02 | Stop reason: HOSPADM

## 2024-10-31 RX ORDER — POTASSIUM CHLORIDE 1500 MG/1
40 TABLET, EXTENDED RELEASE ORAL PRN
Status: DISCONTINUED | OUTPATIENT
Start: 2024-10-31 | End: 2024-11-02 | Stop reason: HOSPADM

## 2024-10-31 RX ORDER — POTASSIUM CHLORIDE 7.45 MG/ML
10 INJECTION INTRAVENOUS PRN
Status: DISCONTINUED | OUTPATIENT
Start: 2024-10-31 | End: 2024-11-02 | Stop reason: HOSPADM

## 2024-10-31 RX ORDER — ACETAMINOPHEN 325 MG/1
650 TABLET ORAL EVERY 6 HOURS PRN
Status: DISCONTINUED | OUTPATIENT
Start: 2024-10-31 | End: 2024-11-02 | Stop reason: HOSPADM

## 2024-10-31 RX ORDER — ONDANSETRON 2 MG/ML
4 INJECTION INTRAMUSCULAR; INTRAVENOUS EVERY 6 HOURS PRN
Status: DISCONTINUED | OUTPATIENT
Start: 2024-10-31 | End: 2024-11-02 | Stop reason: HOSPADM

## 2024-10-31 RX ORDER — SODIUM CHLORIDE 0.9 % (FLUSH) 0.9 %
5-40 SYRINGE (ML) INJECTION EVERY 12 HOURS SCHEDULED
Status: DISCONTINUED | OUTPATIENT
Start: 2024-10-31 | End: 2024-11-02 | Stop reason: HOSPADM

## 2024-10-31 RX ORDER — SODIUM CHLORIDE 9 MG/ML
INJECTION, SOLUTION INTRAVENOUS CONTINUOUS
Status: DISCONTINUED | OUTPATIENT
Start: 2024-10-31 | End: 2024-10-31

## 2024-10-31 RX ORDER — ACETAMINOPHEN 650 MG/1
650 SUPPOSITORY RECTAL EVERY 6 HOURS PRN
Status: DISCONTINUED | OUTPATIENT
Start: 2024-10-31 | End: 2024-11-02 | Stop reason: HOSPADM

## 2024-10-31 RX ORDER — MAGNESIUM SULFATE IN WATER 40 MG/ML
2000 INJECTION, SOLUTION INTRAVENOUS PRN
Status: DISCONTINUED | OUTPATIENT
Start: 2024-10-31 | End: 2024-11-02 | Stop reason: HOSPADM

## 2024-10-31 RX ADMIN — SODIUM CHLORIDE: 9 INJECTION, SOLUTION INTRAVENOUS at 17:25

## 2024-10-31 RX ADMIN — PIPERACILLIN AND TAZOBACTAM 3375 MG: 3; .375 INJECTION, POWDER, LYOPHILIZED, FOR SOLUTION INTRAVENOUS at 17:26

## 2024-10-31 ASSESSMENT — PAIN SCALES - GENERAL: PAINLEVEL_OUTOF10: 0

## 2024-10-31 ASSESSMENT — PAIN - FUNCTIONAL ASSESSMENT
PAIN_FUNCTIONAL_ASSESSMENT: 0-10
PAIN_FUNCTIONAL_ASSESSMENT: NONE - DENIES PAIN

## 2024-10-31 NOTE — ED PROVIDER NOTES
I independently examined and evaluated Georgi Ryan.  I personally saw the patient and performed a substantive portion of the visit including all aspects of the medical decision making.    In brief, this 69-year-old female is presenting due to jaundice and worsening LFTs on outpatient labs.  Has known metastatic cancer, following with oncology and GI.  Denies any significant pain, nausea, fevers.    Focused exam revealed   General: Alert, no acute distress, patient resting comfortably   Skin: warm, intact, jaundiced  Head: Normocephalic, atraumatic   Eye: Scleral icterus noted  Cardiac: Normal peripheral perfusion  Respiratory: No acute distress   Musculoskeletal: No deformity, full ROM.   Neurological: alert and oriented, normal sensory and motor observed.   Psychiatric: Cooperative    ED course: Labwork obtained and confirms mild transaminitis and hyperbilirubinemia.  Discussed with oncology and gastroenterology who request admission for MRCP and further treatment.  Patient discussed with hospice by the JASON and admitted.    All diagnostic, treatment, and disposition decisions were made by myself in conjunction with the advanced practice provider/resident.    I personally saw the patient and made/approved the management plan and take responsibility for the patient management.     For all further details of the patient's emergency department visit, please see the advanced practice provider's/resident's documentation.      Arnold Dominguez,   10/31/24 1554

## 2024-10-31 NOTE — ED NOTES
Paronychia of the Finger or Toe  Paronychia is an infection near a fingernail or toenail. It usually occurs when an opening in the cuticle or an ingrown toenail lets bacteria under the skin.  The infection will need to be drained if pus is present. If the infection has been caught early, you may need only antibiotic treatment. Healing will take about 1 to 2 weeks.  Home care  Follow these guidelines when caring for yourself at home:    Clean and soak the toe or finger. Do this 2 times a day for the first 3 days. To do so:    Soak your foot or hand in a tub of warm water for 5 minutes. Or hold your toe or finger under a faucet of warm running water for 5 minutes.    Clean any crust away with soap and water using a cotton swab.    Put antibiotic ointment on the infected area.    Change the dressing daily or any time it gets dirty.    If you were given antibiotics, take them as directed until they are all gone.    If your infection is on a toe, wear comfortable shoes with a lot of toe room. You can also wear open-toed sandals while your toe heals.    You may use over-the-counter medicine (acetaminophen or ibuprofen to help with pain, unless another medicine was prescribed. If you have chronic liver or kidney disease, talk with your healthcare provider before using these medicines. Also talk with your provider if you've had a stomach ulcer or GI (gastrointestinal) bleeding.  Prevention  The following can prevent paronychia:    Avoid cutting or playing with your cuticles at home.    Don't bite your nails.    Don't suck on your thumbs or fingers.  Follow-up care  Follow up with your healthcare provider, or as advised.  When to seek medical advice  Call your healthcare provider right away if any of these occur:    Redness, pain, or swelling of the finger or toe gets worse    Red streaks in the skin leading away from the wound    Pus or fluid draining from the nail area    Fever of 100.4 F (38 C) or higher, or as directed  Georgi Ryan is a 69 y.o. female admitted for  Principal Problem:    Liver lesion  Resolved Problems:    * No resolved hospital problems. *  .   Patient Home via self with   Chief Complaint   Patient presents with    Abnormal Lab    Jaundice     Patient had a recent dx of cancer. States she's had several labs drawn and reports she's been jaundice for the past week. [facility] called her today and told her to go to the ED for admission due to abnormal labs    .  Patient is alert and Person, Place, Time, and Situation  Patient's baseline mobility: Baseline Mobility: Independent   Code Status: Prior   Cardiac Rhythm:       Is patient on baseline Oxygen: no how many Liters:   Abnormal Assessment Findings:     Isolation: None      NIH Score:    C-SSRS: Risk of Suicide: No Risk  Bedside swallow:        Active LDA's:    Patient admitted with a roth:  If the roth is chronic was it exchanged:  Reason for roth:   Patient admitted with Central Line:  . PICC line placement confirmed: YES OR NO:[id number]}   Reason for Central line:   Was central line Inserted from an outside facility:        Family/Caregiver Present yes Any Concerns: no   Restraints no  Sitter no         Vitals: MEWS Score: 2    Vitals:    10/31/24 1307 10/31/24 1352 10/31/24 1408 10/31/24 1509   BP: (!) 151/64 (!) 153/62 (!) 165/73 (!) 150/69   Pulse: 92 96 84 97   Resp: 22 20 20 23   Temp:       TempSrc:       SpO2: 99% 98% 100% 98%   Weight:           Last documented pain score (0-10 scale) Pain Level: 0  Pain medication administered No.    Pertinent or High Risk Medications/Drips: No.    Pending Blood Product Administration: no    Abnormal labs:   Abnormal Labs Reviewed   CBC WITH AUTO DIFFERENTIAL - Abnormal; Notable for the following components:       Result Value    WBC 18.4 (*)     RBC 2.81 (*)     Hemoglobin 8.2 (*)     Hematocrit 25.2 (*)     RDW 17.4 (*)     Platelets 460 (*)     Neutrophils Absolute 16.7 (*)     Lymphocytes Absolute 0.0 (*)     Bands  by your provider  Date Last Reviewed: 8/1/2016 2000-2017 [email or web address] [address] [place] [address], [place] [address]. All rights reserved. This information is not intended as a substitute for professional medical care. Always follow your healthcare professional's instructions.

## 2024-10-31 NOTE — PROGRESS NOTES
4 Eyes Skin Assessment and Patient belongings     The patient is being assess for  Admission    I agree that 2 Nurses have performed a thorough Head to Toe Skin Assessment on the patient. ALL assessment sites listed below have been assessed.       Areas assessed by both nurses: Zoya Jiang RN  [x]   Head, Face, and Ears   [x]   Shoulders, Back, and Chest  [x]   Arms, Elbows, and Hands   [x]   Coccyx, Sacrum, and IschIum  [x]   Legs, Feet, and Heels        Does the Patient have Skin Breakdown?  No         Angelo Prevention initiated:  No   Wound Care Orders initiated:  No      [facility] nurse consulted for Pressure Injury (Stage 3,4, Unstageable, DTI, NWPT, and Complex wounds), New and Established Ostomies:  No      I agree that 2 Nurses have reviewed patient belongings with the patient/family and documented in the flowsheet upon admission or transfer to the unit.     Belongings  Dental Appliances: Partials, Lowers  Vision - Corrective Lenses: Eyeglasses  Hearing Aid: None  Clothing: Footwear, Pants, Shirt, Socks  Jewelry: None  Body Piercings Removed: No  Electronic Devices: Computer, Cell Phone  Weapons (Notify Protective Services/Security): None  Other Valuables: At home  Home Medications: None  Valuables Given To: Patient  Provide Name(s) of Who Valuable(s) Were Given To: Georgi Tiwari 1 eSignature: Electronically signed by Deidre Agarwal RN on 10/31/24 at 5:40 PM EDT    **SHARE this note so that the co-signing nurse is able to place an eSignature**    Nurse 2 eSignature: Electronically signed by Marcial Brown RN on 10/31/24 at 6:04 PM EDT

## 2024-10-31 NOTE — ED PROVIDER NOTES
[facility]  ED  EMERGENCY DEPARTMENT ENCOUNTER        Pt Name: Georgi Ryan  MRN: [id number]  Birthdate 1955  Date of evaluation: 10/31/2024  Provider: PRANAV Salamanca - CNP  PCP: Luisana Gunter PA  Note Started: 3:42 PM EDT 10/31/24       I have seen and evaluated this patient with my supervising physician Dr. Dominguez      CHIEF COMPLAINT       Chief Complaint   Patient presents with    Abnormal Lab    Jaundice     Patient had a recent dx of cancer. States she's had several labs drawn and reports she's been jaundice for the past week. [facility] called her today and told her to go to the ED for admission due to abnormal labs        HISTORY OF PRESENT ILLNESS: 1 or more Elements     History From: the patient  Limitations to history : None    Georgi Ryan is a 69 y.o. female who presents to the ER today with complaints of jaundice/abnormal labs.  Patient was sent from GI for admission, they had labs drawn yesterday and patient is to get an MRCP as an inpatient.    Nursing Notes were all reviewed and agreed with or any disagreements were addressed in the HPI.    REVIEW OF SYSTEMS :      Review of Systems    Positives and Pertinent negatives as per HPI.     SURGICAL HISTORY     Past Surgical History:   Procedure Laterality Date     SECTION      COLONOSCOPY N/A 2021    COLONOSCOPY WITH BIOPSY performed by Vance Green MD at [facility] ENDOSCOPY    COLONOSCOPY  2021    COLONOSCOPY SUBMUCOSAL TATTOO INJECTION performed by Vance Green MD at [facility] ENDOSCOPY    CT NEEDLE BIOPSY LIVER PERCUTANEOUS  10/18/2024    CT NEEDLE BIOPSY LIVER PERCUTANEOUS 10/18/2024 [facility] CT SCAN    KNEE SURGERY      OTHER SURGICAL HISTORY Right     liver biopsy    SMALL INTESTINE SURGERY Right 2021    OPEN RIGHT COLECTOMY performed by Yovani Cooney MD at [facility] OR    UPPER GASTROINTESTINAL ENDOSCOPY N/A 2021    EGD BIOPSY performed by Vance Green  fever, and Liver lesion (10/31/2024).    CONSULTS: (Who and What was discussed)  IP CONSULT TO ONCOLOGY  IP CONSULT TO GI      Social Determinants Significantly Affecting Health : None    Records Reviewed (External and Source)     CC/HPI Summary, DDx, ED Course, and Reassessment: Patient presented to the ER today with complaints of needing to be admitted to the hospital, patient was referred by GI for simple admission to get an MRCP.  Patient already had labs done yesterday, I did speak with oncology they are aware of the patient but noted that GI simply needed to do an MRCP and they were not sure why the patient was referred to the ED.  I did speak with GI who confirmed that they just want the patient admitted, there is no emergent intervention necessary I did do laboratory studies which again are similar to what she had done yesterday, her vital signs are stable she had no acute emergent complaints, she was admitted to the hospital for further evaluation and treatment.  Patient was evaluated by the attending who agrees with the plan.          I am the Primary Clinician of Record.  FINAL IMPRESSION      1. Malignant neoplasm of liver, unspecified liver malignancy type (HCC)    2. Abnormal laboratory test          DISPOSITION/PLAN     DISPOSITION Admitted    PATIENT REFERRED TO:  No follow-up provider specified.    DISCHARGE MEDICATIONS:  New Prescriptions    No medications on file       DISCONTINUED MEDICATIONS:  Discontinued Medications    VITAMIN D (ERGOCALCIFEROL) 1.25 MG (24288 UT) CAPS CAPSULE    Take 1 capsule by mouth once a week              (Please note that portions of this note were completed with a voice recognition program.  Efforts were made to edit the dictations but occasionally words are mis-transcribed.)    PRANAV Salamanca CNP (electronically signed)      Suhas Llamas APRN - CNP  10/31/24 5750

## 2024-10-31 NOTE — CONSULTS
Gastroenterology H&P/Consult Note    Patient: Georgi Ryan CSN: [id number]     YOB: 1955  Age: 69 y.o.  Sex: female    Unit: [facility] EMERGENCY DEPT Room/Bed: 10/10 Location: [facility]     Admitting Physician: JOANN DELGADO    Date of  Admission: 10/31/2024   Admission type: Emergency  Primary Care Physician: Luisana Gunter PA         Chief Complaint: Jaundice  Consult Date: 10/31/2024     Subjective:     History of Present Illness: Georgi Ryan is a 69 y.o. female who is seen at the request of JOANN DELGADO for Jaundice.    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  Patient was seen in the GI clinic on 10/30/2024 for jaundice and obstructing liver masses on CT. Reports worsening jaundice and dark urine. Denies abdominal pain, nausea, vomiting, fever, chills, unintentional weight loss, constipation, diarrhea, hematochezia or melenic stools. Patient follows with oncology, Dr. Goncalves at [facility].     Labs on presentation 10/31/2024 reviewed with abnormal liver test elevated , , , total bilirubin 15.7, low albumin 2.8.  Unremarkable BMP except glucose 141.  CBC noted leukocytosis with WBC 18.4 with bandemia.  Acute on chronic normocytic anemia with hemoglobin 8.2, hematocrit 25.2, MCV 89.9, platelets 460.  UA noted trace leukocyte esterase and large bilirubin.    CT abdomen, pelvis with contrast on 10/14/2024 noted numerous hypodense mass lesions throughout both lobes of liver, largest in the right lobe measuring 7 x 5 cm.  Mild prominence of central biliary duct.  Normal common bile duct.  Questionable fullness along the periportal region of the liver measuring 2.5 cm.  Multiple abdominal wall hernias.    CT-guided liver lesion biopsy 10/18/2024 noted poorly differentiated carcinoma.    Pertinent PMH, FH, SH is  along the fundus of the gallbladder which could represent early cholecystitis.  Recommend ultrasound correlation and surgical follow-up. 5 cm left renal cyst and small hypodensities in both kidneys which may also represent cysts but are too small to characterize and are unchanged with no hydronephrosis or renal stones. ' Atrophic uterus with no pelvic mass or active inflammation and postop changes along the cecum which is unchanged. 13 mm hypodensity left lobe of the thyroid gland.  Recommend ultrasound follow-up.       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  Patient was seen in the GI clinic on 10/30/2024 for jaundice and obstructing liver masses.    Labs on presentation 10/31/2024 reviewed with abnormal liver test elevated , , , total bilirubin 15.7, low albumin 2.8.  Unremarkable BMP except glucose 141.  CBC noted leukocytosis with WBC 18.4 with bandemia.  Acute on chronic normocytic anemia with hemoglobin 8.2, hematocrit 25.2, MCV 89.9, platelets 460.  UA noted trace leukocyte esterase and large bilirubin. UA on 10/14/24 positive with moderate leuks, 4+ bacteria and oxalate crystals    CT abdomen, pelvis with contrast on 10/14/2024 noted numerous hypodense mass lesions throughout both lobes of liver, largest in the right lobe measuring 7 x 5 cm.  Mild prominence of central biliary duct.  Normal common bile duct.  Questionable fullness along the periportal region of the liver measuring 2.5 cm.  Multiple abdominal wall hernias.    Impression:   Obstructive  jaundice due to numerous hepatic adenocarcinoma with intrahepatic biliary ductal dilations.  Leukocytosis in setting recent UTI     Plan:   Obtain MRI with MRCP to further evaluate bile duct and liver lesions.  NPO after midnight, plan for ERCP tomorrow if evidence of central biliary ductal obstruction on MRI  Obtain blood cultures.   Start IV Zosyn  Monitor CBC, LFTs, INR daily     Signed By: Santi Betancourt MD     October 31, 2024      [facility]  [phone]. Also available via [employer]    Please note that some or all of this record was generated using voice recognition software. If there are any questions about the content of this document, please contact the author as some errors in translation may have occurred.

## 2024-10-31 NOTE — PROGRESS NOTES
Pt A&O x4, assessment complete and charted. Pt RA, ambulates indep, skin juandice, Last BM 10/31 AM, BS in all 4 quadrants, NPO @ midnight for MRI in morning. Pt denies any pain, Pt sitting in chair. Bed at lowest position. Call light and bedside table within reach.

## 2024-10-31 NOTE — H&P
Hospital Medicine History & Physical      Date of Admission: 10/31/2024    Date of Service:  Pt seen/examined on 10/31/24     []Admitted to Inpatient with expected LOS greater than two midnights due to medical therapy.  [x]Placed in Observation status.    Chief Admission Complaint:        Presenting Admission History:      This is a 69 female with PMH of colon ca is S/p colectomy and follows with ON[facility] Dr Goncalves, about 1 month ago pt noted she was having RLU tenderness and thought her GB may have and issue. She was in process of follow up with DR Goncalves who had ordered labs and CT of abd, prior to getting the CT she presented to [facility] for hematuria and possible concern for UTI. She ultimate did not have a UTI and a CT was obtained on that ED visit, results were called to Dr Goncalves, PT also underwent a PET scan. SHe noted she was turning yellow and her stool and urine abn color. She also had a liver Bx. She returns today having been recently seen by Hem/ONC and GI who ask her to be admitted for ERCP     Of note she is feeling well, able to eat and toelrate PO reports appetite is not great and she can eat as much as she did. Cont with some RLU intermittent discomfort. No fever chill, no cough, no  symptoms, No N/V/D.        Assessment/Plan:      Current Principal Problem:  Liver lesion      Liver lesion, jaundice in the setting of know hx of adenocarcinoma suspect obstructive jaundice in the the setting of Biliary Carcinoma met.   - recent Liver Bx- unable to see path  - Recent PET scan   - following with GI and Hem/Onc- consults placed  - Plan NPO, IVF support, Likely needs ERCP       Transaminitis : in the setting of above  - trend labs     Anemia: multifactorial. Stable for now, trend     Leukocytosis: no fever or symptom complaints  - recent Bx  - POA with WBC of 18.4   - check cxr and ua                Physical Exam Performed:      BP (!) 165/77   Pulse 99   Temp 98 °F (36.7 °C) (Oral)   Resp 21   Wt 87.3 kg

## 2024-10-31 NOTE — PLAN OF CARE
Note--     Received perfect serve message for possible admission .   Admitting orders placed after discussing with the ER physician and as per data provided by ER physician,  patient will be seen and full H&P to follow, per protocol.

## 2024-11-01 ENCOUNTER — APPOINTMENT (OUTPATIENT)
Dept: MRI IMAGING | Age: 69
End: 2024-11-01
Payer: MEDICARE

## 2024-11-01 ENCOUNTER — APPOINTMENT (OUTPATIENT)
Dept: GENERAL RADIOLOGY | Age: 69
End: 2024-11-01
Payer: MEDICARE

## 2024-11-01 ENCOUNTER — ANESTHESIA (OUTPATIENT)
Dept: ENDOSCOPY | Age: 69
End: 2024-11-01
Payer: MEDICARE

## 2024-11-01 ENCOUNTER — ANESTHESIA EVENT (OUTPATIENT)
Dept: ENDOSCOPY | Age: 69
End: 2024-11-01
Payer: MEDICARE

## 2024-11-01 ENCOUNTER — TELEPHONE (OUTPATIENT)
Dept: FAMILY MEDICINE CLINIC | Age: 69
End: 2024-11-01

## 2024-11-01 DIAGNOSIS — C22.9 MALIGNANT NEOPLASM OF LIVER, UNSPECIFIED LIVER MALIGNANCY TYPE (HCC): Primary | ICD-10-CM

## 2024-11-01 LAB
ALBUMIN SERPL-MCNC: 2.5 G/DL (ref 3.4–5)
ALBUMIN/GLOB SERPL: 0.9 {RATIO} (ref 1.1–2.2)
ALP SERPL-CCNC: 458 U/L (ref 40–129)
ALT SERPL-CCNC: 114 U/L (ref 10–40)
ANION GAP SERPL CALCULATED.3IONS-SCNC: 7 MMOL/L (ref 3–16)
ANISOCYTOSIS BLD QL SMEAR: ABNORMAL
AST SERPL-CCNC: 116 U/L (ref 15–37)
BACTERIA BLD CULT ORG #2: NORMAL
BACTERIA BLD CULT: NORMAL
BASOPHILS # BLD: 0 K/UL (ref 0–0.2)
BASOPHILS NFR BLD: 0 %
BILIRUB DIRECT SERPL-MCNC: 10.8 MG/DL (ref 0–0.3)
BILIRUB INDIRECT SERPL-MCNC: 3.7 MG/DL (ref 0–1)
BILIRUB SERPL-MCNC: 14.5 MG/DL (ref 0–1)
BUN SERPL-MCNC: 9 MG/DL (ref 7–20)
CALCIUM SERPL-MCNC: 9.5 MG/DL (ref 8.3–10.6)
CHLORIDE SERPL-SCNC: 104 MMOL/L (ref 99–110)
CO2 SERPL-SCNC: 24 MMOL/L (ref 21–32)
CREAT SERPL-MCNC: 0.7 MG/DL (ref 0.6–1.2)
DEPRECATED RDW RBC AUTO: 17.9 % (ref 12.4–15.4)
EKG ATRIAL RATE: 97 BPM
EKG DIAGNOSIS: NORMAL
EKG P AXIS: 49 DEGREES
EKG P-R INTERVAL: 130 MS
EKG Q-T INTERVAL: 330 MS
EKG QRS DURATION: 68 MS
EKG QTC CALCULATION (BAZETT): 419 MS
EKG R AXIS: 27 DEGREES
EKG T AXIS: -10 DEGREES
EKG VENTRICULAR RATE: 97 BPM
EOSINOPHIL # BLD: 0.2 K/UL (ref 0–0.6)
EOSINOPHIL NFR BLD: 1 %
GFR SERPLBLD CREATININE-BSD FMLA CKD-EPI: >90 ML/MIN/{1.73_M2}
GLUCOSE SERPL-MCNC: 103 MG/DL (ref 70–99)
HCT VFR BLD AUTO: 23.4 % (ref 36–48)
HGB BLD-MCNC: 7.7 G/DL (ref 12–16)
HYPOCHROMIA BLD QL SMEAR: ABNORMAL
INR PPP: 1.15 (ref 0.85–1.15)
LYMPHOCYTES # BLD: 1.1 K/UL (ref 1–5.1)
LYMPHOCYTES NFR BLD: 7 %
MACROCYTES BLD QL SMEAR: ABNORMAL
MCH RBC QN AUTO: 29.7 PG (ref 26–34)
MCHC RBC AUTO-ENTMCNC: 33.1 G/DL (ref 31–36)
MCV RBC AUTO: 89.7 FL (ref 80–100)
MICROCYTES BLD QL SMEAR: ABNORMAL
MONOCYTES # BLD: 0.5 K/UL (ref 0–1.3)
MONOCYTES NFR BLD: 3 %
NEUTROPHILS # BLD: 14.2 K/UL (ref 1.7–7.7)
NEUTROPHILS NFR BLD: 80 %
NEUTS BAND NFR BLD MANUAL: 9 % (ref 0–7)
PLATELET # BLD AUTO: 415 K/UL (ref 135–450)
PLATELET BLD QL SMEAR: ADEQUATE
PMV BLD AUTO: 8.7 FL (ref 5–10.5)
POIKILOCYTOSIS BLD QL SMEAR: ABNORMAL
POLYCHROMASIA BLD QL SMEAR: ABNORMAL
POTASSIUM SERPL-SCNC: 3.7 MMOL/L (ref 3.5–5.1)
PROT SERPL-MCNC: 5.4 G/DL (ref 6.4–8.2)
PROTHROMBIN TIME: 14.9 SEC (ref 11.9–14.9)
RBC # BLD AUTO: 2.61 M/UL (ref 4–5.2)
SLIDE REVIEW: ABNORMAL
SODIUM SERPL-SCNC: 135 MMOL/L (ref 136–145)
WBC # BLD AUTO: 15.9 K/UL (ref 4–11)

## 2024-11-01 PROCEDURE — 6360000002 HC RX W HCPCS: Performed by: INTERNAL MEDICINE

## 2024-11-01 PROCEDURE — C2625 STENT, NON-COR, TEM W/DEL SY: HCPCS | Performed by: INTERNAL MEDICINE

## 2024-11-01 PROCEDURE — 6360000002 HC RX W HCPCS

## 2024-11-01 PROCEDURE — 74330 X-RAY BILE/PANC ENDOSCOPY: CPT

## 2024-11-01 PROCEDURE — 82105 ALPHA-FETOPROTEIN SERUM: CPT

## 2024-11-01 PROCEDURE — 3609018800 HC ERCP DX COLLECTION SPECIMEN BRUSHING/WASHING: Performed by: INTERNAL MEDICINE

## 2024-11-01 PROCEDURE — G0378 HOSPITAL OBSERVATION PER HR: HCPCS

## 2024-11-01 PROCEDURE — 85025 COMPLETE CBC W/AUTO DIFF WBC: CPT

## 2024-11-01 PROCEDURE — 36415 COLL VENOUS BLD VENIPUNCTURE: CPT

## 2024-11-01 PROCEDURE — 74183 MRI ABD W/O CNTR FLWD CNTR: CPT

## 2024-11-01 PROCEDURE — 7100000001 HC PACU RECOVERY - ADDTL 15 MIN: Performed by: INTERNAL MEDICINE

## 2024-11-01 PROCEDURE — 88112 CYTOPATH CELL ENHANCE TECH: CPT

## 2024-11-01 PROCEDURE — 2580000003 HC RX 258

## 2024-11-01 PROCEDURE — 3609012400 HC EGD TRANSORAL BIOPSY SINGLE/MULTIPLE: Performed by: INTERNAL MEDICINE

## 2024-11-01 PROCEDURE — 93010 ELECTROCARDIOGRAM REPORT: CPT | Performed by: INTERNAL MEDICINE

## 2024-11-01 PROCEDURE — 3700000000 HC ANESTHESIA ATTENDED CARE: Performed by: INTERNAL MEDICINE

## 2024-11-01 PROCEDURE — 3609014900 HC ERCP W/SPHINCTEROTOMY &/OR PAPILLOTOMY: Performed by: INTERNAL MEDICINE

## 2024-11-01 PROCEDURE — 2580000003 HC RX 258: Performed by: INTERNAL MEDICINE

## 2024-11-01 PROCEDURE — 2580000003 HC RX 258: Performed by: NURSE PRACTITIONER

## 2024-11-01 PROCEDURE — 85610 PROTHROMBIN TIME: CPT

## 2024-11-01 PROCEDURE — 3609015100 HC ERCP STENT PLACEMENT BILIARY/PANCREATIC DUCT: Performed by: INTERNAL MEDICINE

## 2024-11-01 PROCEDURE — 80053 COMPREHEN METABOLIC PANEL: CPT

## 2024-11-01 PROCEDURE — 2500000003 HC RX 250 WO HCPCS

## 2024-11-01 PROCEDURE — A9581 GADOXETATE DISODIUM INJ: HCPCS | Performed by: INTERNAL MEDICINE

## 2024-11-01 PROCEDURE — 2709999900 HC NON-CHARGEABLE SUPPLY: Performed by: INTERNAL MEDICINE

## 2024-11-01 PROCEDURE — 6360000004 HC RX CONTRAST MEDICATION: Performed by: INTERNAL MEDICINE

## 2024-11-01 PROCEDURE — 88305 TISSUE EXAM BY PATHOLOGIST: CPT

## 2024-11-01 PROCEDURE — 6360000002 HC RX W HCPCS: Performed by: NURSE PRACTITIONER

## 2024-11-01 PROCEDURE — 7100000000 HC PACU RECOVERY - FIRST 15 MIN: Performed by: INTERNAL MEDICINE

## 2024-11-01 PROCEDURE — 3700000001 HC ADD 15 MINUTES (ANESTHESIA): Performed by: INTERNAL MEDICINE

## 2024-11-01 PROCEDURE — 2720000010 HC SURG SUPPLY STERILE: Performed by: INTERNAL MEDICINE

## 2024-11-01 DEVICE — BILIARY STENT WITH NAVIFLEXTM RX DELIVERY SYSTEM
Type: IMPLANTABLE DEVICE | Site: BILE DUCT | Status: FUNCTIONAL
Brand: ADVANIX™ BILIARY

## 2024-11-01 RX ORDER — PROPOFOL 10 MG/ML
INJECTION, EMULSION INTRAVENOUS
Status: DISCONTINUED | OUTPATIENT
Start: 2024-11-01 | End: 2024-11-01 | Stop reason: SDUPTHER

## 2024-11-01 RX ORDER — DEXAMETHASONE SODIUM PHOSPHATE 4 MG/ML
INJECTION, SOLUTION INTRA-ARTICULAR; INTRALESIONAL; INTRAMUSCULAR; INTRAVENOUS; SOFT TISSUE
Status: DISCONTINUED | OUTPATIENT
Start: 2024-11-01 | End: 2024-11-01 | Stop reason: SDUPTHER

## 2024-11-01 RX ORDER — ONDANSETRON 2 MG/ML
INJECTION INTRAMUSCULAR; INTRAVENOUS
Status: DISCONTINUED | OUTPATIENT
Start: 2024-11-01 | End: 2024-11-01 | Stop reason: SDUPTHER

## 2024-11-01 RX ORDER — SODIUM CHLORIDE 0.9 % (FLUSH) 0.9 %
5-40 SYRINGE (ML) INJECTION PRN
Status: DISCONTINUED | OUTPATIENT
Start: 2024-11-01 | End: 2024-11-01

## 2024-11-01 RX ORDER — DIPHENHYDRAMINE HYDROCHLORIDE 50 MG/ML
12.5 INJECTION INTRAMUSCULAR; INTRAVENOUS
Status: DISCONTINUED | OUTPATIENT
Start: 2024-11-01 | End: 2024-11-01

## 2024-11-01 RX ORDER — SODIUM CHLORIDE 9 MG/ML
INJECTION, SOLUTION INTRAVENOUS
Status: DISCONTINUED | OUTPATIENT
Start: 2024-11-01 | End: 2024-11-01 | Stop reason: SDUPTHER

## 2024-11-01 RX ORDER — OXYCODONE HYDROCHLORIDE 5 MG/1
5 TABLET ORAL
Status: DISCONTINUED | OUTPATIENT
Start: 2024-11-01 | End: 2024-11-01

## 2024-11-01 RX ORDER — ROCURONIUM BROMIDE 10 MG/ML
INJECTION, SOLUTION INTRAVENOUS
Status: DISCONTINUED | OUTPATIENT
Start: 2024-11-01 | End: 2024-11-01 | Stop reason: SDUPTHER

## 2024-11-01 RX ORDER — ONDANSETRON 2 MG/ML
4 INJECTION INTRAMUSCULAR; INTRAVENOUS
Status: DISCONTINUED | OUTPATIENT
Start: 2024-11-01 | End: 2024-11-01

## 2024-11-01 RX ORDER — MEPERIDINE HYDROCHLORIDE 50 MG/ML
12.5 INJECTION INTRAMUSCULAR; INTRAVENOUS; SUBCUTANEOUS EVERY 5 MIN PRN
Status: DISCONTINUED | OUTPATIENT
Start: 2024-11-01 | End: 2024-11-01

## 2024-11-01 RX ORDER — LORAZEPAM 2 MG/ML
0.5 INJECTION INTRAMUSCULAR
Status: DISCONTINUED | OUTPATIENT
Start: 2024-11-01 | End: 2024-11-01

## 2024-11-01 RX ORDER — LABETALOL HYDROCHLORIDE 5 MG/ML
10 INJECTION, SOLUTION INTRAVENOUS
Status: DISCONTINUED | OUTPATIENT
Start: 2024-11-01 | End: 2024-11-01

## 2024-11-01 RX ORDER — PROCHLORPERAZINE EDISYLATE 5 MG/ML
5 INJECTION INTRAMUSCULAR; INTRAVENOUS
Status: DISCONTINUED | OUTPATIENT
Start: 2024-11-01 | End: 2024-11-01

## 2024-11-01 RX ORDER — SODIUM CHLORIDE 9 MG/ML
INJECTION, SOLUTION INTRAVENOUS PRN
Status: DISCONTINUED | OUTPATIENT
Start: 2024-11-01 | End: 2024-11-01

## 2024-11-01 RX ORDER — NALOXONE HYDROCHLORIDE 0.4 MG/ML
INJECTION, SOLUTION INTRAMUSCULAR; INTRAVENOUS; SUBCUTANEOUS PRN
Status: DISCONTINUED | OUTPATIENT
Start: 2024-11-01 | End: 2024-11-02 | Stop reason: HOSPADM

## 2024-11-01 RX ORDER — SUCCINYLCHOLINE CHLORIDE 20 MG/ML
INJECTION INTRAMUSCULAR; INTRAVENOUS
Status: DISCONTINUED | OUTPATIENT
Start: 2024-11-01 | End: 2024-11-01 | Stop reason: SDUPTHER

## 2024-11-01 RX ORDER — LIDOCAINE HYDROCHLORIDE 20 MG/ML
INJECTION, SOLUTION EPIDURAL; INFILTRATION; INTRACAUDAL; PERINEURAL
Status: DISCONTINUED | OUTPATIENT
Start: 2024-11-01 | End: 2024-11-01 | Stop reason: SDUPTHER

## 2024-11-01 RX ORDER — SODIUM CHLORIDE 0.9 % (FLUSH) 0.9 %
5-40 SYRINGE (ML) INJECTION EVERY 12 HOURS SCHEDULED
Status: DISCONTINUED | OUTPATIENT
Start: 2024-11-01 | End: 2024-11-01

## 2024-11-01 RX ADMIN — PROPOFOL 20 MG: 10 INJECTION, EMULSION INTRAVENOUS at 18:47

## 2024-11-01 RX ADMIN — PIPERACILLIN AND TAZOBACTAM 3375 MG: 3; .375 INJECTION, POWDER, LYOPHILIZED, FOR SOLUTION INTRAVENOUS at 12:00

## 2024-11-01 RX ADMIN — SUGAMMADEX 200 MG: 100 INJECTION, SOLUTION INTRAVENOUS at 19:21

## 2024-11-01 RX ADMIN — ONDANSETRON 4 MG: 2 INJECTION INTRAMUSCULAR; INTRAVENOUS at 18:33

## 2024-11-01 RX ADMIN — SODIUM CHLORIDE: 9 INJECTION, SOLUTION INTRAVENOUS at 18:24

## 2024-11-01 RX ADMIN — LIDOCAINE HYDROCHLORIDE 100 MG: 20 INJECTION, SOLUTION EPIDURAL; INFILTRATION; INTRACAUDAL at 18:28

## 2024-11-01 RX ADMIN — GADOXETATE DISODIUM 8 ML: 181.43 INJECTION, SOLUTION INTRAVENOUS at 10:50

## 2024-11-01 RX ADMIN — PIPERACILLIN AND TAZOBACTAM 3375 MG: 3; .375 INJECTION, POWDER, LYOPHILIZED, FOR SOLUTION INTRAVENOUS at 20:26

## 2024-11-01 RX ADMIN — DEXAMETHASONE SODIUM PHOSPHATE 4 MG: 4 INJECTION, SOLUTION INTRAMUSCULAR; INTRAVENOUS at 18:33

## 2024-11-01 RX ADMIN — ROCURONIUM BROMIDE 5 MG: 50 INJECTION, SOLUTION INTRAVENOUS at 18:28

## 2024-11-01 RX ADMIN — PROPOFOL 110 MG: 10 INJECTION, EMULSION INTRAVENOUS at 18:28

## 2024-11-01 RX ADMIN — ONDANSETRON 4 MG: 2 INJECTION INTRAMUSCULAR; INTRAVENOUS at 20:03

## 2024-11-01 RX ADMIN — SODIUM CHLORIDE, PRESERVATIVE FREE 10 ML: 5 INJECTION INTRAVENOUS at 12:00

## 2024-11-01 RX ADMIN — SUCCINYLCHOLINE CHLORIDE 100 MG: 20 INJECTION, SOLUTION INTRAMUSCULAR; INTRAVENOUS at 18:28

## 2024-11-01 RX ADMIN — ROCURONIUM BROMIDE 25 MG: 50 INJECTION, SOLUTION INTRAVENOUS at 18:34

## 2024-11-01 ASSESSMENT — PAIN SCALES - GENERAL: PAINLEVEL_OUTOF10: 0

## 2024-11-01 ASSESSMENT — ENCOUNTER SYMPTOMS: SHORTNESS OF BREATH: 0

## 2024-11-01 ASSESSMENT — LIFESTYLE VARIABLES: SMOKING_STATUS: 0

## 2024-11-01 NOTE — TELEPHONE ENCOUNTER
Patient's daughter, Colleen, called the office asking if a handicap placard can be placed for her mother as she just found out that she has liver cancer and jaundice with suspect of obstructive jaundice.     Colleen is not on the HIPAA form, daughter Taylor is on the HIPAA form. Colleen would like to pick the script up if possible. No patient information given, explained that Taylor might have to pick it up if given.     Caren branch, thanks.

## 2024-11-01 NOTE — PLAN OF CARE
Problem: Safety - Adult  Goal: Free from fall injury  11/1/2024 1052 by Jenny Kincaid, RN  Outcome: Progressing  Flowsheets (Taken 11/1/2024 1052)  Free From Fall Injury: Instruct family/caregiver on patient safety     Problem: Discharge Planning  Goal: Discharge to home or other facility with appropriate resources  11/1/2024 1052 by Jenny Kincaid, RN  Outcome: Progressing  Flowsheets (Taken 11/1/2024 1052)  Discharge to home or other facility with appropriate resources:   Arrange for needed discharge resources and transportation as appropriate   Identify barriers to discharge with patient and caregiver  10/31/2024 2247 by Osbaldo Serrano RN  Outcome: Progressing     Problem: Nutrition Deficit:  Goal: Optimize nutritional status  Outcome: Progressing  Flowsheets (Taken 11/1/2024 1052)  Nutrient intake appropriate for improving, restoring, or maintaining nutritional needs:   Monitor oral intake, labs, and treatment plans   Assess nutritional status and recommend course of action

## 2024-11-01 NOTE — PROGRESS NOTES
Patient arrived to PACU bay 9, phase one initiated. Placed on bedside monitor, VSS. Report obtained from OR RN and anesthesia. Patient on room air. See flowsheets for assessment. Warm blankets applied. Side rails in place, will monitor patient closely.

## 2024-11-01 NOTE — ACP (ADVANCE CARE PLANNING)
Advance Care Planning   Advance Care Planning Clinical Specialist  Conversation Note      Date of ACP Conversation: 11/1/2024    Conversation Conducted with:   Patient with Decision Making Capacity   Healthcare Decision Maker: Named in Advance Directive or Healthcare Power of  as below    ACP Clinical Specialist: Sylvia Girard RN      *When Decision Maker makes decisions on behalf of the incapacitated patient: Decision Maker is asked to consider and make decisions based on patient values, known preferences, or best interests.       Current Designated Health Care Decision Maker:   Primary Decision Maker: Taylor Pierre - Child - [phone]    Secondary Decision Maker (Active): Colleen Corona - Child - [phone]  (as entered in ACP Activity Healthcare Decision Maker field.  Validate  this information as still accurate & up-to-date; edit Healthcare Decision Maker field as needed.)       For below questions, when conducting conversation with Health Care Decision Maker, substitute \"she\" and \"her\" for \"you\" and \"your\".      Hospitalization  If your health were to worsen and it became clear that your chance of recovery was unlikely, what would your preferences be regarding hospitalization?:    Choice:  [x]  The patient would want hospitalization  []  The patient would prefer comfort-focused treatment without hospitalization.    Ventilation  If you were in your present state of health and suddenly became very ill and were unable to breathe on your own, what would your preference be about the use of a ventilator (breathing machine) if it were available to you?      If patient would desire the use of a ventilator (breathing machine), answer \"yes\", if not \"no\":yes    If your health were to worsen and it became clear that your chance of recovery was unlikely, would that change your answer? Yes    Resuscitation  CPR works best to restart the heart when there is a sudden event, like a heart attack, in someone who is  otherwise healthy. Unfortunately, CPR does not typically restart the heart for people who have serious health conditions or who are very sick.    In the event your heart stopped, would you want attempts to restart your heart (answer \"yes\") or would you prefer a natural death (answer \"no\")? yes    If your health were to worsen and it became clear that your chance of recovery was unlikely, would that change your answer? Yes    [x] Yes  [] No   Educated Patient / Decision Maker regarding differences between Advance Directives and portable DNR orders.    Length of ACP Conversation in minutes:  20 minutes    Conversation Outcomes:  [x] ACP discussion completed  [] Existing advance directive reviewed with patient; no changes to patient's previously recorded wishes   [] New Advance Directive completed   [] Portable Do Not Rescitate prepared for Provider review and signature  [] POLST/POST/MOLST/MOST prepared for Provider review and signature      Follow-up plan:    [] Schedule follow-up conversation to continue planning  [] Referred individual to Provider for additional questions/concerns   [] Advised patient/agent/surrogate to review completed ACP document and update if needed with changes in condition, patient preferences or care setting     [] This note routed to one or more involved healthcare providers      Sylvia Girard, RN  ACP Clinical Specialist

## 2024-11-01 NOTE — PROGRESS NOTES
Comprehensive Nutrition Assessment    Type and Reason for Visit:  Initial, Positive nutrition screen (weight loss and decreased appetite)    Nutrition Recommendations/Plan:   Advance diet to Regular as able to per MD.  Send Ensure Plus HP BID as diet allows - pt prefers chocolate.   Encourage PO intakes.   Monitor labs, diet advancement, supplement intake, bowel habits.      Malnutrition Assessment:  Malnutrition Status:  Mild malnutrition (11/01/24 1302)    Context:  Chronic Illness     Findings of the 6 clinical characteristics of malnutrition:  Energy Intake:  75% or less estimated energy requirements for 1 month or longer  Weight Loss:  Mild weight loss (-5% x4 months)     Body Fat Loss:  Unable to assess     Muscle Mass Loss:  Unable to assess    Fluid Accumulation:  No fluid accumulation     Strength:  Not Performed    Nutrition Assessment:    Patient seen for positive nutritiion screen. Pt admitted for abnormal labs, jaundice. Currently NPO for MRI. 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 Patient nutritionally at risk AEB poor appetite. Patient off floor at x2 attempts, spoke with pts dght in room. She reports patient has unitentionally lost weight since July. States her UBW is 200lbs. Reports pt experiencing early satiety the past few weeks. Also reports pts taste is off. States pt has been trying to eat small frequent meals every 2hrs to help with ongoing nausea. Recently bought Boost shakes. Agreeable to send Ensure shakes while admitted. Will continue to monitor.    Nutrition Related Findings:    Na 135, glucose 103 - 141, albumin 2.5 Wound Type: None       Current Nutrition Intake & Therapies:    Average Meal Intake: NPO  Average Supplements Intake: NPO  Diet NPO  ADULT ORAL NUTRITION SUPPLEMENT; Lunch, Dinner; Standard High Calorie/High Protein Oral Supplement    Anthropometric Measures:  Height: 162.6 cm (5'

## 2024-11-01 NOTE — CONSULTS
[facility]    Consult Note      Requesting Physician:  Suhas Llamas     CHIEF COMPLAINT:  continuity       HISTORY OF PRESENT ILLNESS:    Georgi Ryan is a 69 year old female with PMHX of colon cancer s/p colectomy, as well as cancer of unknown primary followed by [facility] who presented to [facility] ED on 10/31/2024 as instructed by GI due to elevated bilirubin and elevated LFTs. Plan is for MRCP inpatient and likely ERCP pending results.     Hem/onc consulted for continuity. Patient reports feeling well. Denies abdominal pain.     Ms. Ryan  is a 69 y.o. female we are seeing in consultation for     ICD-10-CM    1. Malignant neoplasm of liver, unspecified liver malignancy type (HCC)  C22.9       2. Abnormal laboratory test  R89.9              Past Medical History:  Past Medical History:   Diagnosis Date    Anemia     Cancer (HCC)     colon    History of scarlet fever     Liver lesion 10/31/2024       Past Surgical History:  Past Surgical History:   Procedure Laterality Date     SECTION      COLONOSCOPY N/A 2021    COLONOSCOPY WITH BIOPSY performed by Vance Green MD at [facility] ENDOSCOPY    COLONOSCOPY  2021    COLONOSCOPY SUBMUCOSAL TATTOO INJECTION performed by Vance Green MD at [facility] ENDOSCOPY    CT NEEDLE BIOPSY LIVER PERCUTANEOUS  10/18/2024    CT NEEDLE BIOPSY LIVER PERCUTANEOUS 10/18/2024 [facility] CT SCAN    KNEE SURGERY      OTHER SURGICAL HISTORY Right     liver biopsy    SMALL INTESTINE SURGERY Right 2021    OPEN RIGHT COLECTOMY performed by Yovani Cooney MD at [facility] OR    UPPER GASTROINTESTINAL ENDOSCOPY N/A 2021    EGD BIOPSY performed by Vance Green MD at [facility] ENDOSCOPY       Current Medications:  Current Facility-Administered Medications   Medication Dose Route Frequency Provider Last Rate Last Admin     sodium chloride flush 0.9 % injection 5-40 mL  5-40 mL IntraVENous 2 times per day Bethel, Faina M, APRN - CNP        sodium chloride flush 0.9 % injection 5-40 mL  5-40 mL IntraVENous PRN Bethel, Faina M, APRN - CNP        0.9 % sodium chloride infusion   IntraVENous PRN Bethel, Faina M, APRN - CNP        potassium chloride (KLOR-CON M) extended release tablet 40 mEq  40 mEq Oral PRN August, Faina M, APRN - CNP        Or    potassium bicarb-citric acid (EFFER-K) effervescent tablet 40 mEq  40 mEq Oral PRN August, Faina M, APRN - CNP        Or    potassium chloride 10 mEq/100 mL IVPB (Peripheral Line)  10 mEq IntraVENous PRN August, Faina M, APRN - CNP        magnesium sulfate 2000 mg in 50 mL IVPB premix  2,000 mg IntraVENous PRN Bethel, Faina M, APRN - CNP        ondansetron (ZOFRAN-ODT) disintegrating tablet 4 mg  4 mg Oral Q8H PRN Bethel, Faina M, APRN - CNP        Or    ondansetron (ZOFRAN) injection 4 mg  4 mg IntraVENous Q6H PRN Bethel, Faina M, APRN - CNP        acetaminophen (TYLENOL) tablet 650 mg  650 mg Oral Q6H PRN Bethel, Faina M, APRN - CNP        Or    acetaminophen (TYLENOL) suppository 650 mg  650 mg Rectal Q6H PRN Bethel, Faina M, APRN - CNP        piperacillin-tazobactam (ZOSYN) 3,375 mg in sodium chloride 0.9 % 50 mL IVPB (mini-bag)  3,375 mg IntraVENous 3 times per day Santi Betancourt MD   Stopped at 10/31/24 2126       Allergies:  Allergies   Allergen Reactions    Novocain [Procaine] Rash       Social History:  Social History     Socioeconomic History    Marital status:      Spouse name: Not on file    Number of children: 2    Years of education: Not on file    Highest education level: Not on file   Occupational History    Not on file   Tobacco Use    Smoking status: Former     Current packs/day: 0.00     Average packs/day: 2.0 packs/day for 39.3 years (78.5 ttl pk-yrs)     Types: Cigarettes     Start date: 9/29/1972     Quit date: 1/1/2012     Years since  esophagus at time of ERCP. Discussed with GI for ability to add EGD. See below     Obstructive Jaundice  - labs 10/29/2024 showed bilirubin 16.7 mg/dL with alk phos 605, ,   - bilirubin 14.5 this AM, alk phos 458, ,   - MRI abdomen 11/01 showed innumerable liver metastasis seen which compress the bile ducts and cause resultant biliary ductal dilatation.   - GI recommendations pending. If ERCP not possible would consider IR consult for percutaneous biliary drain placement if feasible?  - MD to follow up in AM    75 minutes spent reviewing outside records, labs, imaging; ordering further lab workup; and discussion with patient, family, GI.     Thank you for asking me to see the patient.       KAREN MYRICK  Please Contact Through Perfect Serve

## 2024-11-01 NOTE — PLAN OF CARE
Problem: Safety - Adult  Goal: Free from fall injury  Outcome: Progressing  Goal: Able to ambulate with minimal assistance  Description: Able to ambulate with minimal assistance  Outcome: Progressing  Goal: Able to ambulate independently  Description: Able to ambulate independently  Outcome: Progressing     Problem: Discharge Planning  Goal: Discharge to home or other facility with appropriate resources  Outcome: Progressing  Goal: Absence of bilirubin toxicity signs and symptoms  Description: Absence of bilirubin toxicity signs and symptoms  Outcome: Progressing

## 2024-11-01 NOTE — CARE COORDINATION
Case Management Assessment  Initial Evaluation    Date/Time of Evaluation: 11/1/2024 1:24 PM  Assessment Completed by: Sylvia Girard RN    If patient is discharged prior to next notation, then this note serves as note for discharge by case management.    Patient Name: Georgi Ryan                   YOB: 1955  Diagnosis: Abnormal laboratory test [R89.9]  Liver lesion [K76.9]  Malignant neoplasm of liver, unspecified liver malignancy type (HCC) [C22.9]                   Date / Time: 10/31/2024  8:49 AM    Patient Admission Status: Observation   Readmission Risk (Low < 19, Mod (19-27), High > 27): No data recorded  Current PCP: Luisana Gunter PA  PCP verified by CM? Yes    Chart Reviewed: Yes      History Provided by: Patient  Patient Orientation: Alert and Oriented    Patient Cognition: Alert    Hospitalization in the last 30 days (Readmission):  Yes    If yes, Readmission Assessment in CM Navigator will be completed.    Advance Directives:      Code Status: Full Code   Patient's Primary Decision Maker is: Named in Scanned ACP Document (family to bring AD's in for scanning)    Primary Decision Maker: Taylor Pierre - Child - [phone]    Secondary Decision Maker (Active): Colleen Corona - Child - [phone]    Discharge Planning:    Patient lives with: Alone Type of Home: House  Primary Care Giver: Self  Patient Support Systems include: Children   Current Financial resources: Medicare  Current community resources: None  Current services prior to admission: None            Current DME:              Type of Home Care services:  None    ADLS  Prior functional level: Independent in ADLs/IADLs  Current functional level: Independent in ADLs/IADLs    PT AM-PAC:   /24  OT AM-PAC:   /24    Family can provide assistance at DC: Yes  Would you like Case Management to discuss the discharge plan with any other family members/significant others, and if so, who? Yes (daughters)  Plans to Return to Present

## 2024-11-01 NOTE — PROGRESS NOTES
[facility] Medicine Progress Note  V 10.25      Date of Admission: 10/31/2024    Hospital Day: 2      Chief Admission Complaint:    Sent in from home     Subjective:  No acute events since admission, she is in bed awake in NAD, no new complaints, NPO currently for procedure     Presenting Admission History:     This is a 69 female with PMH of colon ca is S/p colectomy and follows with [facility] Dr Goncalves, about 1 month ago pt noted she was having RLU tenderness and thought her GB may have and issue. She was in process of follow up with DR Goncalves who had ordered labs and CT of abd, prior to getting the CT she presented to [facility] for hematuria and possible concern for UTI. She ultimate did not have a UTI and a CT was obtained on that ED visit, results were called to Dr Goncalves, PT also underwent a PET scan. SHe noted she was turning yellow and her stool and urine abn color. She also had a liver Bx. She returns today having been recently seen by Hem/ONC and GI who ask her to be admitted for ERCP      Of note she is feeling well, able to eat and toelrate PO reports appetite is not great and she can eat as much as she did. Cont with some RLU intermittent discomfort. No fever chill, no cough, no  symptoms, No N/V/D.     Assessment/Plan:       Current Principal Problem:  Liver lesion        Liver lesion, jaundice in the setting of know hx of adenocarcinoma suspect obstructive jaundice in the the setting of Biliary Carcinoma met.   - recent Liver Bx- adenocarcinoma   - Recent PET scan   - following with GI and Hem/Onc- consults placed  - Plan NPO, IVF support,  - GI consult note reviewed, plan for MRI/MRCP with ERCP possible    - MRI abdomen 11/01 showed innumerable liver metastasis seen which compress the bile ducts and cause resultant biliary ductal dilatation.      Transaminitis : in the setting of above  - trend labs      Anemia: multifactorial.   - appears to be down trending, monitor closely Tx < 7.0 or symptomatic

## 2024-11-01 NOTE — PROGRESS NOTES
Pt assessment completed and charted. VSS. Pt a/o. Pt denies any pain at this time. Pt remained NPO today for procedures. Pt tolerating IV abx.  Bed in lowest position and wheels locked. Call light within reach. Bedside table within reach. Non-skid socks in place. Pt denies any other needs at this time.  Pt calls out appropriately.

## 2024-11-01 NOTE — ANESTHESIA PRE PROCEDURE
piperacillin-tazobactam (ZOSYN) 3,375 mg in sodium chloride 0.9 % 50 mL IVPB (mini-bag)  3,375 mg IntraVENous 3 times per day Santi Betancourt MD   Stopped at 24 1600       Allergies:    Allergies   Allergen Reactions    Novocain [Procaine] Rash       Problem List:    Patient Active Problem List   Diagnosis Code    Diverticulosis of colon K57.30    S/P partial colectomy Z90.49    Post-menopausal Z78.0    History of colon cancer Z85.038    Pre-diabetes R73.03    Elevated LDL cholesterol level E78.00    Age-related osteoporosis without current pathological fracture M81.0    Vitamin D deficiency E55.9    Liver lesion K76.9       Past Medical History:        Diagnosis Date    Anemia     Cancer (HCC)     colon    History of scarlet fever     Liver lesion 10/31/2024       Past Surgical History:        Procedure Laterality Date     SECTION      COLONOSCOPY N/A 2021    COLONOSCOPY WITH BIOPSY performed by Vance Green MD at [facility] ENDOSCOPY    COLONOSCOPY  2021    COLONOSCOPY SUBMUCOSAL TATTOO INJECTION performed by Vance Green MD at [facility] ENDOSCOPY    CT NEEDLE BIOPSY LIVER PERCUTANEOUS  10/18/2024    CT NEEDLE BIOPSY LIVER PERCUTANEOUS 10/18/2024 [facility] CT SCAN    KNEE SURGERY      OTHER SURGICAL HISTORY Right     liver biopsy    SMALL INTESTINE SURGERY Right 2021    OPEN RIGHT COLECTOMY performed by Yovani Cooney MD at [facility] OR    UPPER GASTROINTESTINAL ENDOSCOPY N/A 2021    EGD BIOPSY performed by Vance Green MD at [facility] ENDOSCOPY       Social History:    Social History     Tobacco Use    Smoking status: Former     Current packs/day: 0.00     Average packs/day: 2.0 packs/day for 39.3 years (78.5 ttl pk-yrs)     Types: Cigarettes     Start date: 1972     Quit date: 2012     Years since quittin.8    Smokeless tobacco: Never   Substance Use Topics    Alcohol use: Yes     Comment: 4 x per year

## 2024-11-02 VITALS
HEART RATE: 97 BPM | BODY MASS INDEX: 32.46 KG/M2 | TEMPERATURE: 97.4 F | OXYGEN SATURATION: 97 % | RESPIRATION RATE: 16 BRPM | DIASTOLIC BLOOD PRESSURE: 81 MMHG | SYSTOLIC BLOOD PRESSURE: 148 MMHG | HEIGHT: 64 IN | WEIGHT: 190.1 LBS

## 2024-11-02 LAB
ALBUMIN SERPL-MCNC: 2.7 G/DL (ref 3.4–5)
ALP SERPL-CCNC: 481 U/L (ref 40–129)
ALT SERPL-CCNC: 119 U/L (ref 10–40)
ANION GAP SERPL CALCULATED.3IONS-SCNC: 12 MMOL/L (ref 3–16)
ANISOCYTOSIS BLD QL SMEAR: ABNORMAL
AST SERPL-CCNC: 114 U/L (ref 15–37)
BASOPHILS # BLD: 0.4 K/UL (ref 0–0.2)
BASOPHILS NFR BLD: 2 %
BILIRUB DIRECT SERPL-MCNC: 12.6 MG/DL (ref 0–0.3)
BILIRUB INDIRECT SERPL-MCNC: 3.3 MG/DL (ref 0–1)
BILIRUB SERPL-MCNC: 15.9 MG/DL (ref 0–1)
BUN SERPL-MCNC: 12 MG/DL (ref 7–20)
CALCIUM SERPL-MCNC: 10 MG/DL (ref 8.3–10.6)
CHLORIDE SERPL-SCNC: 100 MMOL/L (ref 99–110)
CO2 SERPL-SCNC: 25 MMOL/L (ref 21–32)
CREAT SERPL-MCNC: 0.8 MG/DL (ref 0.6–1.2)
DEPRECATED RDW RBC AUTO: 18.7 % (ref 12.4–15.4)
EOSINOPHIL # BLD: 0 K/UL (ref 0–0.6)
EOSINOPHIL NFR BLD: 0 %
GFR SERPLBLD CREATININE-BSD FMLA CKD-EPI: 80 ML/MIN/{1.73_M2}
GLUCOSE SERPL-MCNC: 186 MG/DL (ref 70–99)
HCT VFR BLD AUTO: 25.1 % (ref 36–48)
HGB BLD-MCNC: 8.1 G/DL (ref 12–16)
HYPOCHROMIA BLD QL SMEAR: ABNORMAL
LYMPHOCYTES # BLD: 0.9 K/UL (ref 1–5.1)
LYMPHOCYTES NFR BLD: 4 %
MACROCYTES BLD QL SMEAR: ABNORMAL
MCH RBC QN AUTO: 29.4 PG (ref 26–34)
MCHC RBC AUTO-ENTMCNC: 32.3 G/DL (ref 31–36)
MCV RBC AUTO: 90.8 FL (ref 80–100)
MICROCYTES BLD QL SMEAR: ABNORMAL
MONOCYTES # BLD: 0.4 K/UL (ref 0–1.3)
MONOCYTES NFR BLD: 2 %
NEUTROPHILS # BLD: 20.1 K/UL (ref 1.7–7.7)
NEUTROPHILS NFR BLD: 87 %
NEUTS BAND NFR BLD MANUAL: 5 % (ref 0–7)
OVALOCYTES BLD QL SMEAR: ABNORMAL
PLATELET # BLD AUTO: 447 K/UL (ref 135–450)
PLATELET BLD QL SMEAR: ABNORMAL
PMV BLD AUTO: 8.3 FL (ref 5–10.5)
POLYCHROMASIA BLD QL SMEAR: ABNORMAL
POTASSIUM SERPL-SCNC: 3.6 MMOL/L (ref 3.5–5.1)
PROT SERPL-MCNC: 5.8 G/DL (ref 6.4–8.2)
RBC # BLD AUTO: 2.77 M/UL (ref 4–5.2)
SCHISTOCYTES BLD QL SMEAR: ABNORMAL
SLIDE REVIEW: ABNORMAL
SODIUM SERPL-SCNC: 137 MMOL/L (ref 136–145)
SPHEROCYTES BLD QL SMEAR: ABNORMAL
TARGETS BLD QL SMEAR: ABNORMAL
WBC # BLD AUTO: 21.8 K/UL (ref 4–11)

## 2024-11-02 PROCEDURE — 6360000002 HC RX W HCPCS: Performed by: INTERNAL MEDICINE

## 2024-11-02 PROCEDURE — 96366 THER/PROPH/DIAG IV INF ADDON: CPT

## 2024-11-02 PROCEDURE — 80076 HEPATIC FUNCTION PANEL: CPT

## 2024-11-02 PROCEDURE — 85025 COMPLETE CBC W/AUTO DIFF WBC: CPT

## 2024-11-02 PROCEDURE — 2580000003 HC RX 258: Performed by: INTERNAL MEDICINE

## 2024-11-02 PROCEDURE — 36415 COLL VENOUS BLD VENIPUNCTURE: CPT

## 2024-11-02 PROCEDURE — 80048 BASIC METABOLIC PNL TOTAL CA: CPT

## 2024-11-02 PROCEDURE — G0378 HOSPITAL OBSERVATION PER HR: HCPCS

## 2024-11-02 RX ORDER — ONDANSETRON 4 MG/1
4 TABLET, ORALLY DISINTEGRATING ORAL EVERY 8 HOURS PRN
Qty: 30 TABLET | Refills: 0 | Status: SHIPPED | OUTPATIENT
Start: 2024-11-02

## 2024-11-02 RX ORDER — CIPROFLOXACIN 500 MG/1
500 TABLET, FILM COATED ORAL 2 TIMES DAILY
Qty: 14 TABLET | Refills: 0 | Status: SHIPPED | OUTPATIENT
Start: 2024-11-02 | End: 2024-11-09

## 2024-11-02 RX ORDER — METRONIDAZOLE 500 MG/1
500 TABLET ORAL 2 TIMES DAILY
Qty: 14 TABLET | Refills: 0 | Status: SHIPPED | OUTPATIENT
Start: 2024-11-02 | End: 2024-11-09

## 2024-11-02 RX ADMIN — PIPERACILLIN AND TAZOBACTAM 3375 MG: 3; .375 INJECTION, POWDER, LYOPHILIZED, FOR SOLUTION INTRAVENOUS at 04:07

## 2024-11-02 NOTE — PLAN OF CARE
Problem: Safety - Adult  Goal: Free from fall injury  11/2/2024 0014 by Jenny Ivey RN  Outcome: Progressing  11/1/2024 1052 by Jenny Kincaid RN  Outcome: Progressing  Flowsheets (Taken 11/1/2024 1052)  Free From Fall Injury: Instruct family/caregiver on patient safety  Goal: Able to ambulate with minimal assistance  Description: Able to ambulate with minimal assistance  Outcome: Progressing  Goal: Able to ambulate independently  Description: Able to ambulate independently  Outcome: Progressing     Problem: Discharge Planning  Goal: Discharge to home or other facility with appropriate resources  11/2/2024 0014 by Jenny Ivey RN  Outcome: Progressing  11/1/2024 1052 by Jenny Kincaid RN  Outcome: Progressing  Flowsheets (Taken 11/1/2024 1052)  Discharge to home or other facility with appropriate resources:   Arrange for needed discharge resources and transportation as appropriate   Identify barriers to discharge with patient and caregiver  Goal: Absence of bilirubin toxicity signs and symptoms  Description: Absence of bilirubin toxicity signs and symptoms  Outcome: Progressing  Goal: Daily care needs are met  Description: Daily care needs are met  Outcome: Progressing     Problem: Nutrition Deficit:  Goal: Optimize nutritional status  11/2/2024 0014 by Jenny Ivey RN  Outcome: Progressing  11/1/2024 1052 by Jenny Kincaid RN  Outcome: Progressing  Flowsheets (Taken 11/1/2024 1052)  Nutrient intake appropriate for improving, restoring, or maintaining nutritional needs:   Monitor oral intake, labs, and treatment plans   Assess nutritional status and recommend course of action

## 2024-11-02 NOTE — PLAN OF CARE
Problem: Safety - Adult  Goal: Free from fall injury  11/2/2024 1226 by Katerin Colunga RN  Outcome: Adequate for Discharge  11/2/2024 0014 by Jenny Ivey RN  Outcome: Progressing  Goal: Able to ambulate with minimal assistance  Description: Able to ambulate with minimal assistance  11/2/2024 1226 by Katerin Colunga RN  Outcome: Adequate for Discharge  11/2/2024 0014 by Jenny Ivey RN  Outcome: Progressing  Goal: Able to ambulate independently  Description: Able to ambulate independently  11/2/2024 1226 by Katerin Colunga RN  Outcome: Adequate for Discharge  11/2/2024 0014 by Jenny Ivey RN  Outcome: Progressing     Problem: Discharge Planning  Goal: Discharge to home or other facility with appropriate resources  11/2/2024 1226 by Katerin Colunga RN  Outcome: Adequate for Discharge  Flowsheets (Taken 11/2/2024 0815)  Discharge to home or other facility with appropriate resources:   Identify barriers to discharge with patient and caregiver   Arrange for needed discharge resources and transportation as appropriate   Identify discharge learning needs (meds, wound care, etc)   Arrange for interpreters to assist at discharge as needed   Refer to discharge planning if patient needs post-hospital services based on physician order or complex needs related to functional status, cognitive ability or social support system  11/2/2024 0014 by Jenny Ivey RN  Outcome: Progressing  Goal: Absence of bilirubin toxicity signs and symptoms  Description: Absence of bilirubin toxicity signs and symptoms  11/2/2024 1226 by Katerin Colunga RN  Outcome: Adequate for Discharge  11/2/2024 0014 by Jenny Ivey RN  Outcome: Progressing  Goal: Daily care needs are met  Description: Daily care needs are met  11/2/2024 1226 by Katerin Colunga RN  Outcome: Adequate for Discharge  11/2/2024 0014 by Jenny Ivey RN  Outcome: Progressing     Problem: Nutrition Deficit:  Goal: Optimize nutritional  status  11/2/2024 1226 by Katerin Colunga, RN  Outcome: Adequate for Discharge  11/2/2024 0014 by Jenny Ivey RN  Outcome: Progressing  Goal: Able to eat  Description: Able to eat  Outcome: Adequate for Discharge  Goal: Able to drink  Description: Able to drink  Outcome: Adequate for Discharge

## 2024-11-02 NOTE — DISCHARGE SUMMARY
Hospital Medicine Discharge Summary    Patient: Georgi Ryan   : 1955     Admit Date: 10/31/2024   Discharge Date:     Disposition:  [x]Home   []HHC  []SNF  []Acute Rehab  []LTAC  []Hospice  Code status:  [x]Full  []DNR/CCA  []Limited (DNR/CCA with Do Not Intubate)  []DNRCC  Condition at Discharge: Stable  Primary Care Provider: Luisana Gunter PA    Admitting Provider: Joce Rogers DO  Discharge Provider: Faina Koch, APRN - CNP     Discharge Diagnoses:      Active Hospital Problems    Diagnosis     Liver lesion [K76.9]      Chief Admission Complaint:    Sent in from home      Subjective:  No acute events since admission,   Presenting Admission History:     This is a 69 female with PMH of colon ca is S/p colectomy and follows with [facility] Dr Goncalves, about 1 month ago pt noted she was having RLU tenderness and thought her GB may have and issue. She was in process of follow up with DR Goncalves who had ordered labs and CT of abd, prior to getting the CT she presented to [facility] for hematuria and possible concern for UTI. She ultimate did not have a UTI and a CT was obtained on that ED visit, results were called to Dr Goncalves, PT also underwent a PET scan. SHe noted she was turning yellow and her stool and urine abn color. She also had a liver Bx. She returns today having been recently seen by Hem/ONC and GI who ask her to be admitted for ERCP      Of note she is feeling well, able to eat and toelrate PO reports appetite is not great and she can eat as much as she did. Cont with some RLU intermittent discomfort. No fever chill, no cough, no  symptoms, No N/V/D.     Assessment/Plan:       Current Principal Problem:  Liver lesion        Liver lesion, jaundice in the setting of know hx of adenocarcinoma suspect obstructive jaundice in the the setting of Biliary Carcinoma met.   - recent Liver Bx- adenocarcinoma   - Recent PET scan   - following with GI and Hem/Onc- consults placed  - Plan NPO, IVF support,  -  seen Bones/Soft Tissues:   There are moderate degenerative changes throughout the spine with no aggressive osseous lesion     Mildly dilated atherosclerotic thoracic aorta with no aneurysm or dissection and moderate coronary artery calcifications with no mediastinal mass or adenopathy Mild subsegmental bibasilar discoid atelectasis vs early infiltrates or scarring along the left lung base.  There is a 4-5 mm nodule left lower lobe which could represent a single metastasis but is indeterminate.  Recommend follow-up. Mild hepatomegaly with probable metastatic disease throughout the liver which is more prominent.  Suggest PET scan follow-up. Mild dilatation of the central biliary ducts which is more apparent.  There is questionable area of soft tissue fullness along the hilar region of the liver which could represent adenopathy or mass.  Suggest PET scan correlation. Moderate hydrops of the gallbladder with questionable gallstones or sludge near the fundus.  There is diffuse mild wall thickening with questionable minimal pericholecystic fluid and stranding along the fundus of the gallbladder which could represent early cholecystitis.  Recommend ultrasound correlation and surgical follow-up. 5 cm left renal cyst and small hypodensities in both kidneys which may also represent cysts but are too small to characterize and are unchanged with no hydronephrosis or renal stones. ' Atrophic uterus with no pelvic mass or active inflammation and postop changes along the cecum which is unchanged. 13 mm hypodensity left lobe of the thyroid gland.  Recommend ultrasound follow-up.       Consults:     IP CONSULT TO ONCOLOGY  IP CONSULT TO GI    Labs:     Recent Labs     10/31/24  0952 11/01/24  0530 11/02/24  0852   WBC 18.4* 15.9* 21.8*   HGB 8.2* 7.7* 8.1*   HCT 25.2* 23.4* 25.1*   * 415 447     Recent Labs     10/31/24  0952 11/01/24  0530 11/02/24  0852    135* 137   K 3.5 3.7 3.6    104 100   CO2 25 24 25   BUN

## 2024-11-02 NOTE — PROGRESS NOTES
Assessment completed and documented. VSS. A/ox4. Denies pain. Skin Jaundice. Pt up to chair for breakfast eating 100% without difficulty. Pt Denies any N/V. Bed locked and in lowest position. Bedside table and call light within reach. Denies further needs at this time.

## 2024-11-02 NOTE — FLOWSHEET NOTE
11/01/24 2130   Assessment   Charting Type Shift assessment   Psychosocial   Psychosocial (WDL) WDL   Neurological   Neuro (WDL) WDL   Steve Coma Scale   Eye Opening 4   Best Verbal Response 5   Best Motor Response 6   [place] Coma Scale Score 15   HEENT (Head, Ears, Eyes, Nose, & Throat)   HEENT (WDL) X   Right Eye Glasses;Impaired vision   Left Eye Glasses;Impaired vision   Teeth Dentures lower;Partial plate lower   Respiratory   Respiratory (WDL) WDL   Respiratory Interventions H.O.B. elevated   Respiratory Pattern Regular   Respiratory Depth Normal   Respiratory Quality/Effort Unlabored   Chest Assessment Chest expansion symmetrical;Trachea midline   L Breath Sounds Clear   R Breath Sounds Clear   Cardiac   Cardiac (WDL) WDL   Cardiac Regularity Regular   Heart Sounds S1, S2   Gastrointestinal   Abdominal (WDL) X   Abdomen Inspection Soft   RUQ Bowel Sounds Active   LUQ Bowel Sounds Active   RLQ Bowel Sounds Active   LLQ Bowel Sounds Active   GI Symptoms Nausea;Vomiting   Genitourinary   Genitourinary (WDL) WDL   Suprapubic Tenderness No   Dysuria (Pain/Burning w/Urination) No   Difficulty Urinating/Starting Stream No   Peripheral Vascular   Peripheral Vascular (WDL) WDL   Edema None   Skin Integumentary    Skin Integumentary (WDL) X   Skin Color Jaundice   Skin Condition/Temp Warm;Dry   Musculoskeletal   Musculoskeletal (WDL) WDL

## 2024-11-02 NOTE — PROGRESS NOTES
Discharge education provided both verbally and written, patient verbalized understanding, denies questions. Tele monitor removed, [facility] notified. PIV removed without complications. . Patient left with all belongings.

## 2024-11-02 NOTE — PROGRESS NOTES
[facility] Hospital Medicine Progress Note  V 10.25      Date of Admission: 10/31/2024    Hospital Day: 3      Chief Admission Complaint:    Sent in from home     Subjective:  No acute events since admission,   Presenting Admission History:     This is a 69 female with PMH of colon ca is S/p colectomy and follows with [facility] Dr Goncalves, about 1 month ago pt noted she was having RLU tenderness and thought her GB may have and issue. She was in process of follow up with DR Goncalves who had ordered labs and CT of abd, prior to getting the CT she presented to [facility] for hematuria and possible concern for UTI. She ultimate did not have a UTI and a CT was obtained on that ED visit, results were called to Dr Goncalves, PT also underwent a PET scan. SHe noted she was turning yellow and her stool and urine abn color. She also had a liver Bx. She returns today having been recently seen by Hem/ONC and GI who ask her to be admitted for ERCP      Of note she is feeling well, able to eat and toelrate PO reports appetite is not great and she can eat as much as she did. Cont with some RLU intermittent discomfort. No fever chill, no cough, no  symptoms, No N/V/D.     Assessment/Plan:       Current Principal Problem:  Liver lesion        Liver lesion, jaundice in the setting of know hx of adenocarcinoma suspect obstructive jaundice in the the setting of Biliary Carcinoma met.   - recent Liver Bx- adenocarcinoma   - Recent PET scan   - following with GI and Hem/Onc- consults placed  - Plan NPO, IVF support,  - GI consult note reviewed, plan for MRI/MRCP with ERCP possible    - MRI abdomen 11/01 showed innumerable liver metastasis seen which compress the bile ducts and cause resultant biliary ductal dilatation.   - 11/2 S/P ERCP trending labs awaiting path      Transaminitis : in the setting of above  - trend labs      Anemia: multifactorial.   - appears to be down trending, monitor closely Tx < 7.0 or symptomatic      Leukocytosis: no fever or symptom

## 2024-11-02 NOTE — PROGRESS NOTES
ONCOLOGY HEMATOLOGY CARE PROGRESS NOTE      SUBJECTIVE:    Remains admitted. Afebrile. Had MRCP and ERCP with stent yesterday. Feels good and asking to go home.    ROS:     Constitutional:  No weight loss, No fever, No chills, No night sweats.  Energy level good.  Eyes:  No impairment or change in vision  ENT / Mouth:  No pain, abnormal ulceration, bleeding, nasal drip or change in voice or hearing  Cardiovascular:  No chest pain, palpitations, new edema, or calf discomfort  Respiratory:  No pain, hemoptysis, change to breathing  Breast:  No pain, discharge, change in appearance or texture  Gastrointestinal:  No pain, cramping, jaundice, change to eating and bowel habits  Urinary:  No pain, bleeding or change in continence  Genitalia: No pain, bleeding or discharge  Musculoskeletal:  No redness, pain, edema or weakness  Skin:  No pruritus, rash, change to nodules or lesions  Neurologic:  No discomfort, change in mental status, speech, sensory or motor activity  Psychiatric:  No change in concentration or change to affect or mood  Endocrine:  No hot flashes, increased thirst, or change to urine production  Hematologic: No petechiae, ecchymosis or bleeding  Lymphatic:  No lymphadenopathy or lymphedema  Allergy / Immunologic:  No eczema, hives, frequent or recurrent infections    OBJECTIVE        Physical    VITALS:  Patient Vitals for the past 24 hrs:   BP Temp Temp src Pulse Resp SpO2 Height   11/02/24 0800 (!) 148/81 97.4 °F (36.3 °C) Oral 97 16 97 % --   11/02/24 0419 132/74 98.2 °F (36.8 °C) Oral 92 16 95 % --   11/01/24 2355 135/72 98.3 °F (36.8 °C) Oral 94 16 95 % --   11/01/24 2008 (!) 153/80 98 °F (36.7 °C) Oral (!) 103 18 94 % --   11/01/24 1945 (!) 155/59 97.8 °F (36.6 °C) -- 92 25 95 % --   11/01/24 1935 (!) 161/63 -- -- 100 11 -- --   11/01/24 1930 (!) 148/72 97.7 °F (36.5 °C) Temporal (!) 104 16 97 % --   11/01/24 1730 (!) 177/71 97.7 °F (36.5 °C) Temporal 100 16 97 %  --   11/01/24 1440 (!) 158/63 98.3 °F (36.8 °C) Oral 94 17 96 % --   11/01/24 1052 -- -- -- -- -- -- 1.626 m (5' 4\")   11/01/24 0921 (!) 164/82 98.6 °F (37 °C) Oral (!) 108 17 96 % --       24HR INTAKE/OUTPUT:    Intake/Output Summary (Last 24 hours) at 11/2/2024 0826  Last data filed at 11/2/2024 0817  Gross per 24 hour   Intake 457.08 ml   Output 5 ml   Net 452.08 ml       CONSTITUTIONAL: awake, alert, cooperative, no apparent distress   EYES: pupils equal, round and reactive to light, sclera clear and conjunctiva normal  ENT: Normocephalic, without obvious abnormality, atraumatic  NECK: supple, symmetrical, no jugular venous distension and no carotid bruits   HEMATOLOGIC/LYMPHATIC: no cervical, supraclavicular or axillary lymphadenopathy   LUNGS: no increased work of breathing and clear to auscultation   CARDIOVASCULAR: regular rate and rhythm, normal S1 and S2, no murmur noted  ABDOMEN: normal bowel sounds x 4, soft, non-distended, non-tender, no masses palpated, no hepatosplenomgaly   MUSCULOSKELETAL: full range of motion noted, tone is normal  NEUROLOGIC: awake, alert, oriented to name, place and time. Motor skills grossly intact.   SKIN: Normal skin color, texture, turgor and no jaundice. appears intact   EXTREMITIES: no LE edema     DATA:  CBC:    Recent Labs     11/01/24  0530 10/31/24  0952 10/18/24  0730 10/14/24  1936   WBC 15.9* 18.4* 12.8* 14.7*   NEUTROABS 14.2* 16.7*  --  11.8*   LYMPHOPCT 7.0 0.0  --  7.9   RBC 2.61* 2.81* 3.72* 3.40*   HGB 7.7* 8.2* 11.3* 10.0*   HCT 23.4* 25.2* 33.4* 30.8*   MCV 89.7 89.9 89.8 90.4   MCH 29.7 29.4 30.3 29.4   MCHC 33.1 32.7 33.7 32.5   RDW 17.9* 17.4* 13.4 13.1    460* 405 374       PT/INR:    Recent Labs     11/01/24  0530 10/18/24  0730   INR 1.15 1.06     PTT:  No results for input(s): \"APTT\" in the last 720 hours.    CMP:    Recent Labs     11/01/24  0530 10/31/24  0952 10/14/24  1936   * 137 140   K 3.7 3.5 3.6    100 102   CO2 24 25 27  Primary   - CT CAP 03/15/2023 noted possible gallbladder mural nodularity. But follow up RUQ US 03/23/2023 was negative.  -  CT 10/14/2024 showed a 4-5 mm LLL nodule, metastatic disease throughout the liver, mild intrahepatic biliary ductal dilatation, moderate hydrops of the gallbladder.   - CT Guided biopsy of the liver on 10/18/2024 showed a poorly differentiated carcinoma. IHC was consistent with urothelial carcinoma, pancreaticobiliary/upper GI, and breast cancer  - A PET/CT on 10/25/2024 showed numerous large masses and nodules throughout the liver with intense abnormal activity, and indeterminate LLL nodule with low-level activity, mildly increased activity in the lower esophagus.   - CARIS testing pending   - tumor markers 10/29              - CEA 2.58 elevated              - CA 19-9 elevated at 4792.68  - checking AFP   - awaiting biopsy results from ERCP as well as Caris results     Obstructive Jaundice  - labs 10/29/2024 showed bilirubin 16.7 mg/dL with alk phos 605, ,   - bilirubin 14.5 this AM, alk phos 458, ,   - MRI abdomen 11/01 showed innumerable liver metastasis seen which compress the bile ducts and cause resultant biliary ductal dilatation.   - s/p ERCP , stent, biopsy 11/1/24          ONCOLOGIC DISPOSITION: TBD. If discharged, has follow up with Dr. Sacha michele.       Durga Mackenzie MD  Please contact through Perfect Serve

## 2024-11-02 NOTE — PROGRESS NOTES
Gastroenterology Progress Note      Patient Georgi Ryan  MRN: [id number]  YOB: 1955 Age: 69 y.o. Sex: female  Room: [address] [place] [address]       Admitting Physician: Joce Rogers DO   Date of Admission: 10/31/2024  8:49 AM   Primary Care Physician: Luisana Gunter PA     Subjective:  We are following for obstructive jaundice. Georgi Ryan was seen and examined. Offers no complaints. She is s/p ERCP with biliary stent placement 11/1/24.Tolerated regular diet. She wants to go home. Denies abdominal pain, nausea, vomiting, fever, chills, hematochezia or melenic stools.     Current Hospital Schedued Meds   sodium chloride flush  5-40 mL IntraVENous 2 times per day    piperacillin-tazobactam  3,375 mg IntraVENous 3 times per day     Current Hospital IV Meds   sodium chloride       Current Hospital Prn Meds  naloxone 0.4 mg in 10 mL sodium chloride syringe, sodium chloride flush, sodium chloride, potassium chloride **OR** potassium alternative oral replacement **OR** potassium chloride, magnesium sulfate, ondansetron **OR** ondansetron, acetaminophen **OR** acetaminophen    I/O last 3 completed shifts:  In: 457.1 [P.O.:360; IV Piggyback:97.1]  Out: 5 [Blood:5]    Physical Exam:  VITAL SIGNS: BP (!) 148/81   Pulse 97   Temp 97.4 °F (36.3 °C) (Oral)   Resp 16   Ht 1.626 m (5' 4\")   Wt 86.2 kg (190 lb 1.6 oz)   LMP  (LMP Unknown)   SpO2 97%   BMI 32.63 kg/m²   Wt Readings from Last 3 Encounters:   10/31/24 86.2 kg (190 lb 1.6 oz)   10/18/24 85.7 kg (189 lb)   10/14/24 88 kg (194 lb)     Constitutional: Jaundiced. Well developed, Well nourished, No acute distress, Non-toxic appearance.   HENT: Normocephalic, Atraumatic, Bilateral external ears normal, Oropharynx moist, No oral exudates, Nose normal.   Eyes: Scleral icterus, No discharge.   Cardiovascular: Normal heart rate, Normal rhythm, No murmurs  Thorax & Lungs: Normal breath sounds, No respiratory distress  Abdomen: normal bowel sounds,  Questionable fullness along the periportal region of the liver measuring 2.5 cm. Multiple abdominal wall hernias.    CT-guided liver lesion biopsy 10/18/2024 noted poorly differentiated carcinoma.    MRI Abdomen/pelvis 11/1/24: Multiple liver masses are identified throughout the right and left hepatic lobe. Liver masses are seen centrally compressing bile ducts. An index mass posteriorly in the right hepatic lobe measures 5.2 cm x 4.8 cm. An index mass in the region of the caudate lobe measures 3.7 cm by 2.9 cm. Gallbladder is distended with stones. There is intra and extrahepatic biliary ductal dilatation seen. Extrahepatic common duct is not well evaluated secondary to  motion. Extrahepatic common duct measures approximately 8 mm. There are suspected filling defect seen in the extrahepatic common duct    ERCP 11/1/24: The left main hepatic duct, left intrahepatic branches, right main hepatic duct, right intrahepatic branches, common hepatic duct and common bile duct were moderately dilated with smooth tapering to the ampulla. The lower third of the main bile duct was sampled by brushing and treated with stent placement.A biliary sphincterotomy was performed.  One 10 Fr by 7 cm plastic stent was placed into the common bile duct    Impression:   Obstructive Jaundice due to numerous liver masses (adenocarcinoma) s/p ERCP with biliary stent placement.     Plan:  Await cytology  Tolerated regular diet.   Monitor LFTs, CBC.  May transition to Cipro 500mg PO BID and Flagyl 500mg TID for 7 days.   Repeat ERCP in 4 weeks for biliary stent removal/exchange.  Oncology onboard  Ok for discharge from GI standpoint with close follow up with oncology, Dr. Goncalves and myself next week.   Overall prognosis poor   Recall as needed.    Santi Betancourt MD  [facility]  11/2/2024    [phone]. Also available via Perfect Serve    Please note that some or all of this record was generated using voice recognition software. If there are

## 2024-11-04 ENCOUNTER — CARE COORDINATION (OUTPATIENT)
Dept: CASE MANAGEMENT | Age: 69
End: 2024-11-04

## 2024-11-04 ENCOUNTER — TELEPHONE (OUTPATIENT)
Dept: FAMILY MEDICINE CLINIC | Age: 69
End: 2024-11-04

## 2024-11-04 DIAGNOSIS — K76.9 LIVER LESION: Primary | ICD-10-CM

## 2024-11-04 LAB
BACTERIA BLD CULT ORG #2: NORMAL
BACTERIA BLD CULT: NORMAL

## 2024-11-04 PROCEDURE — 1111F DSCHRG MED/CURRENT MED MERGE: CPT | Performed by: PHYSICIAN ASSISTANT

## 2024-11-04 NOTE — TELEPHONE ENCOUNTER
Care Transitions Initial Follow Up Call    Outreach made within 2 business days of discharge: Yes    Patient: Georgi Ryan Patient : 1955   MRN: [id number]  Reason for Admission: Liver lesions   Discharge Date: 24       Spoke with: Dread     Discharge department/facility: [facility] Interactive Patient Contact:  Was patient able to fill all prescriptions: Yes  Was patient instructed to bring all medications to the follow-up visit: Yes  Is patient taking all medications as directed in the discharge summary? Yes  Does patient understand their discharge instructions: Yes  Does patient have questions or concerns that need addressed prior to 7-14 day follow up office visit: no        Scheduled appointment with PCP within 7-14 days    Follow Up  Future Appointments   Date Time Provider Department Center   2024 10:00 AM Luisana Gunter PA [facility] DEP   2024  8:00 AM Luisana Gunter PA [facility] DEP       Brianne Zavala LPN

## 2024-11-04 NOTE — CARE COORDINATION
Discussed follow up appointments. Patient has hospital follow up appointment scheduled within 14 days of discharge.   Future Appointments         Provider Specialty Dept Phone    11/14/2024 10:00 AM Luisana Gunter PA Family Medicine [phone]    11/27/2024 8:00 AM Luisana Gunter PA Family Medicine [phone]            Care Transition Nurse provided contact information.  Plan for follow-up call in 6-10 days based on severity of symptoms and risk factors.  Plan for next call: self management-.       Cydney Gaitan, MSN, RN, [facility]  Care Transition Nurse  [phone] mobile

## 2024-11-05 LAB — AFP-TM SERPL-MCNC: 2.7 UG/L

## 2024-11-06 DIAGNOSIS — C22.0 HEPATOCELLULAR CARCINOMA (HCC): Primary | ICD-10-CM

## 2024-11-07 ENCOUNTER — CARE COORDINATION (OUTPATIENT)
Dept: CASE MANAGEMENT | Age: 69
End: 2024-11-07

## 2024-11-07 ENCOUNTER — HOSPITAL ENCOUNTER (OUTPATIENT)
Age: 69
Discharge: HOME | End: 2024-11-07
Payer: MEDICARE

## 2024-11-07 DIAGNOSIS — C22.0 HEPATOCELLULAR CARCINOMA (HCC): ICD-10-CM

## 2024-11-07 LAB
ALBUMIN SERPL-MCNC: 2.5 G/DL (ref 3.4–5)
ALP SERPL-CCNC: 663 U/L (ref 40–129)
ALT SERPL-CCNC: 92 U/L (ref 10–40)
ANISOCYTOSIS BLD QL SMEAR: ABNORMAL
AST SERPL-CCNC: 117 U/L (ref 15–37)
BASOPHILS # BLD: 0 K/UL (ref 0–0.2)
BASOPHILS NFR BLD: 0 %
BILIRUB DIRECT SERPL-MCNC: 11.9 MG/DL (ref 0–0.3)
BILIRUB INDIRECT SERPL-MCNC: 3.5 MG/DL (ref 0–1)
BILIRUB SERPL-MCNC: 15.4 MG/DL (ref 0–1)
DEPRECATED RDW RBC AUTO: 19.3 % (ref 12.4–15.4)
EOSINOPHIL # BLD: 0.2 K/UL (ref 0–0.6)
EOSINOPHIL NFR BLD: 1 %
HCT VFR BLD AUTO: 26.1 % (ref 36–48)
HGB BLD-MCNC: 8.5 G/DL (ref 12–16)
LYMPHOCYTES # BLD: 0.7 K/UL (ref 1–5.1)
LYMPHOCYTES NFR BLD: 4 %
MCH RBC QN AUTO: 29.8 PG (ref 26–34)
MCHC RBC AUTO-ENTMCNC: 32.7 G/DL (ref 31–36)
MCV RBC AUTO: 91.4 FL (ref 80–100)
MONOCYTES # BLD: 0.9 K/UL (ref 0–1.3)
MONOCYTES NFR BLD: 5 %
NEUTROPHILS # BLD: 16.7 K/UL (ref 1.7–7.7)
NEUTROPHILS NFR BLD: 86 %
NEUTS BAND NFR BLD MANUAL: 4 % (ref 0–7)
PLATELET # BLD AUTO: 372 K/UL (ref 135–450)
PLATELET BLD QL SMEAR: ADEQUATE
PMV BLD AUTO: 9.3 FL (ref 5–10.5)
POIKILOCYTOSIS BLD QL SMEAR: ABNORMAL
POLYCHROMASIA BLD QL SMEAR: ABNORMAL
PROT SERPL-MCNC: 5.1 G/DL (ref 6.4–8.2)
RBC # BLD AUTO: 2.86 M/UL (ref 4–5.2)
SCHISTOCYTES BLD QL SMEAR: ABNORMAL
SLIDE REVIEW: ABNORMAL
STOMATOCYTES BLD QL SMEAR: ABNORMAL
TARGETS BLD QL SMEAR: ABNORMAL
WBC # BLD AUTO: 18.6 K/UL (ref 4–11)

## 2024-11-07 PROCEDURE — 80076 HEPATIC FUNCTION PANEL: CPT

## 2024-11-07 PROCEDURE — 85025 COMPLETE CBC W/AUTO DIFF WBC: CPT

## 2024-11-07 PROCEDURE — 36415 COLL VENOUS BLD VENIPUNCTURE: CPT

## 2024-11-07 NOTE — CARE COORDINATION
Care Transitions Note    Follow Up Call     Patient Current Location:  [place]    Care Transition Nurse contacted the patient by telephone. Verified name and  as identifiers.    Additional needs identified to be addressed with provider   No needs identified                 Method of communication with provider: none.    Care Summary Note: CTN spoke with patient who reported she is doing ok but still hasn't had a substantial bowel movement. Patient was instructed to take miralax and sennot s to help. Patient reported she has been taking both for the past couple of days and just having small bowel movements. Patient is increasing fluids as well. CTN encouraged patient to try warm prune juice to see if that would help as well and if still not working to notify provider. Patient denied any other issues or concerns and reported she is eating and drinking without difficulty.     Plan of care updates since last contact:  Education: s/sx        Advance Care Planning:   Does patient have an Advance Directive: reviewed during previous call, see note. .    Medication Review:  No changes since last call.     Remote Patient Monitoring:  Offered patient enrollment in the Remote Patient Monitoring (RPM) program for in-home monitoring: Patient is not eligible for RPM program because: patient does not have qualifying diagnosis.    Assessments:  Care Transitions Subsequent and Final Call    Subsequent and Final Calls  Do you have any ongoing symptoms?: No  Have your medications changed?: No  Do you have any questions related to your medications?: No  Do you currently have any active services?: No  Do you have any needs or concerns that I can assist you with?: No  Identified Barriers: None  Care Transitions Interventions  Other Interventions:              Follow Up Appointment:   Reviewed upcoming appointment(s).  Future Appointments         Provider Specialty Dept Phone    2024 10:00 AM Luisana Gunter PA Family Medicine

## 2024-11-11 ENCOUNTER — CARE COORDINATION (OUTPATIENT)
Dept: CASE MANAGEMENT | Age: 69
End: 2024-11-11

## 2024-11-13 ENCOUNTER — TELEPHONE (OUTPATIENT)
Dept: SURGERY | Age: 69
End: 2024-11-13

## 2024-11-13 PROBLEM — C23 GALLBLADDER CARCINOMA (HCC): Status: ACTIVE | Noted: 2024-11-13

## 2024-11-13 PROBLEM — C18.2 COLON CANCER, ASCENDING (HCC): Status: ACTIVE | Noted: 2024-11-13

## 2024-11-13 NOTE — TELEPHONE ENCOUNTER
Called and spoke with pt - Scheduled pt for a Surgical Port Placement (MAC) w/ Dr Cooney on Wed 11/20/24 @ 8:30am arrival 6:30am [facility] - NPO after midnight gurmeet b/f surgery - Pt will need  day of surgery - [facility] completed pre-op physical (see media) - Pt understood and agreed w/ above noted - Surgery instructions emailed to pt: [email or web address] (see media)

## 2024-11-14 ENCOUNTER — OFFICE VISIT (OUTPATIENT)
Dept: FAMILY MEDICINE CLINIC | Age: 69
End: 2024-11-14

## 2024-11-14 VITALS
BODY MASS INDEX: 31.79 KG/M2 | HEIGHT: 64 IN | DIASTOLIC BLOOD PRESSURE: 60 MMHG | HEART RATE: 116 BPM | OXYGEN SATURATION: 99 % | SYSTOLIC BLOOD PRESSURE: 118 MMHG | TEMPERATURE: 97.3 F | WEIGHT: 186.2 LBS

## 2024-11-14 DIAGNOSIS — D72.829 LEUKOCYTOSIS, UNSPECIFIED TYPE: ICD-10-CM

## 2024-11-14 DIAGNOSIS — C78.89: ICD-10-CM

## 2024-11-14 DIAGNOSIS — Z09 HOSPITAL DISCHARGE FOLLOW-UP: ICD-10-CM

## 2024-11-14 DIAGNOSIS — D64.9 ANEMIA, UNSPECIFIED TYPE: ICD-10-CM

## 2024-11-14 DIAGNOSIS — C78.7 METASTASIS TO LIVER (HCC): ICD-10-CM

## 2024-11-14 DIAGNOSIS — C18.2 COLON CANCER, ASCENDING (HCC): Primary | ICD-10-CM

## 2024-11-14 RX ORDER — FUROSEMIDE 20 MG/1
20 TABLET ORAL DAILY
COMMUNITY
Start: 2024-11-13

## 2024-11-14 SDOH — ECONOMIC STABILITY: FOOD INSECURITY: WITHIN THE PAST 12 MONTHS, YOU WORRIED THAT YOUR FOOD WOULD RUN OUT BEFORE YOU GOT MONEY TO BUY MORE.: NEVER TRUE

## 2024-11-14 SDOH — ECONOMIC STABILITY: INCOME INSECURITY: HOW HARD IS IT FOR YOU TO PAY FOR THE VERY BASICS LIKE FOOD, HOUSING, MEDICAL CARE, AND HEATING?: NOT HARD AT ALL

## 2024-11-14 SDOH — ECONOMIC STABILITY: FOOD INSECURITY: WITHIN THE PAST 12 MONTHS, THE FOOD YOU BOUGHT JUST DIDN'T LAST AND YOU DIDN'T HAVE MONEY TO GET MORE.: NEVER TRUE

## 2024-11-14 ASSESSMENT — LIFESTYLE VARIABLES
HOW OFTEN DO YOU HAVE A DRINK CONTAINING ALCOHOL: NEVER
HOW MANY STANDARD DRINKS CONTAINING ALCOHOL DO YOU HAVE ON A TYPICAL DAY: PATIENT DOES NOT DRINK

## 2024-11-14 ASSESSMENT — PATIENT HEALTH QUESTIONNAIRE - PHQ9: DEPRESSION UNABLE TO ASSESS: PT REFUSES

## 2024-11-14 NOTE — PROGRESS NOTES
Georgi Hurstagle    Age 69 y.o.    female    1955    MRN [id number]    11/20/2024  Arrival Time_____________  OR Time____________76 Min     Procedure(s):  SURGICAL PORT PLACEMENT                      Monitor Anesthesia Care   Surgeon(s):  Cooney, Yovani Alexandre, MD      DAY ADMIT ___  SDS/OP ___  OUTPT IN BED ___        Phone [phone] (home)                  PCP _____________________ Phone_________________ Epic ( ) Epic CE ( ) Appt ________    NOTES: _________________________________________ Consult/Cardio _______________    ____________________________________________________________________________    ____________________________________________________________________________  PAT APPT DATE:________ TIME: ________  FAXED QAD: _______  (__) H&P w/ Hospitalist    (__) PAT orders in EPIC    (__) Meet with PAT nurse  __________________________________________________________________________  Preop Nurse phone screen complete: _____________  (__) CBC     (__) W/ DIFF ___________  (__) CT CHEST  __________   (__) Hgb A1C    ___________  (__) CHEST X RAY   __________  (__) LIPID PROFILE  ___________  (__) EKG   __________  (__) PT-INR / APTT  ___________  (__) PFT's   __________  (__) BMP   ___________  (__) CAROTIDS  __________  (__) CMP   ___________  (__) VEIN MAPPING  __________  (__) U/A   ___________  ( X ) HISTORY & PHYSICAL __________  (__) URINE C & S  ___________  (__) CARDIAC CLEARANCE __________  (__) U/A W/ FLEX  ___________  (__) PULM. CLEARANCE __________  (__) SERUM PREGNANCY ___________  (__) Preop Orders in EPIC __________  (__) TYPE & SCREEN __________repeat ( ) (__)  __________________ __________  (__) Albumin   ___________  (__)  __________________ __________  (__) TRANSFERRIN  ___________  (__)  __________________ __________  (__) LIVER PROFILE  ___________  (__) URINE PREG DOS __________  (__) MRSA NASAL SWAB ___________  (__) BLOOD SUGAR DOS __________  (__) SED RATE  ___________  (__) OAC

## 2024-11-14 NOTE — PROGRESS NOTES
\"Have you been to the ER, urgent care clinic since your last visit?  Hospitalized since your last visit?\"    YES - When: approximately 1 months ago.  Where and Why: [facility] for liver .    “Have you seen or consulted any other health care providers outside our system since your last visit?”    NO

## 2024-11-14 NOTE — PROGRESS NOTES
Surgery Date and Time:  11/20/24 @ 08:30 am    Arrival Time:  06:30 am        The instructions given when and if a patient needs to stop oral intake prior to surgery varies. Follow the instructions you were      given by your Surgeon or RN during the Pre-op call.      __X__Do not eat or drink anything after Midnight the night before the surgery. NO gum, mints, candy or ice chips the day of surgery.        Only take the following medications with a small sip of water the morning of surgery:  none         Aspirin, Ibuprofen, Advil, Naproxen, Vitamin E and other Anti-inflammatory products and supplements should be stopped        for 5 -7days before surgery or as directed by your physician.     - Do not smoke or vape, and do not drink any alcoholic beverages 24 hours prior to surgery, this includes NA Beer. Refrain from using     any recreational drugs, including non-prescribed prescription drugs.     -You may brush your teeth and gargle the morning of surgery.  DO NOT SWALLOW WATER.    -You MUST plan for a responsible adult to stay on site while you are here and take you home after your surgery. You will not be allowed                to leave alone or drive yourself home. It is requested someone stay with you the first 24 hrs. Your surgery will be cancelled if you do not                have a ride home with a responsible adult.    -A parent/legal guardian must accompany a child scheduled for surgery and plan to stay at the hospital until the child                is discharged. Please do not bring other children with you.    -Please wear simple, loose-fitting clothing to the hospital. Do not bring valuables (money, credit cards, checkbooks, etc.)                Do not wear any makeup (including no eye makeup) and no nail polish if applicable.             - DO NOT wear any jewelry or body piercings day of surgery.  All body piercing jewelry must be removed.             - If you have dentures they will be removed  before going to the OR; we will provide a container.  If you wear contact lenses                or glasses they will be removed, bring a case for them or wear glasses day of surgery.             - Please see your family doctor/pediatrician for a Preop History & Physical and/or concerning medications within 30               days of the surgery date. Non-[facility] physicians can fax H&P/test results to [phone]. You may need cardiac clearance               if you see a cardiologist, check with PCP or surgeon.              -If you have a Living Will and Durable Power of  for Healthcare, please bring in a copy to be scanned at registration.   -Notify your Surgeon if you develop any illness between now and the surgery date, cough, cold, fever, sore throat,     nausea, vomiting, etc.  Please notify your  surgeon if you experience dizziness, shortness of breath or blurred vision     between now & the time of your surgery.              -DO NOT shave your operative site less than 4 days prior to surgery. For face & neck surgery, men may use an electric                razor up to 2 days prior to surgery.   -To help prevent infection, change your sheets the night before surgery. Shower the night before and morning of surgery    using antibacterial soap (Dial or Safeguard) or Hibiclens soap as instructed by your surgeon.     - Do not apply lotion after shower or day of surgery.              -To provide excellent care visitors will be limited to two per room at any given time.              -Please bring your picture ID and insurance card for registration prior to arriving to second floor surgery department.              -If you use a CPAP/BiPAP please bring with you on the day of the surgery. If you use oxygen, please bring portable     tank with you.              -For your convenience [facility] has an outpatient pharmacy on site to fill your prescriptions prior to 5 pm.              -Bring a complete list of all your

## 2024-11-14 NOTE — PROGRESS NOTES
Post-Discharge Transitional Care  Follow Up      Georgi Ryan   YOB: 1955    Date of Office Visit:  11/14/2024  Date of Hospital Admission: 10/31/24  Date of Hospital Discharge: 11/2/24  Risk of hospital readmission (high >=14%. 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 Patient to have labs done prior to her port placement.   - scheduled for in office awv next week. Discuss cancelling vs virtual, patient would like to complete for medicare incentive.   - able to do all adl at home, with good family/friend support. Will call if this changes.     Medical Decision Making: moderate complexity  No follow-ups on file.           Subjective:   HPI:  Follow up of Hospital problems/diagnosis(es): colon cancer, gall bladder metastasis, liver metastasis, obstructive jaundice, transaminitis, anemia,     Inpatient course: Discharge summary reviewed- see chart.  Patient with history of colon cancer s/p colectomy with known recurrence in liver and gb . She was admitted for MRCP. She underwent ercp with stent placement. She was d/c home on cipro and flagyl. Continues following with [facility] and GI.     Interval history/Current status: Since discharge home, danuta  Saw GI yesterday, started on lasix for bilateral lower extremity edema which is helping.   Scheduled for port placement next week with Dr. Cooney- onc did pre op

## 2024-11-18 ENCOUNTER — CARE COORDINATION (OUTPATIENT)
Dept: CASE MANAGEMENT | Age: 69
End: 2024-11-18

## 2024-11-18 NOTE — CARE COORDINATION
Care Transitions Note    Follow Up Call     Patient Current Location:  [place]    Care Transition Nurse contacted the patient by telephone. Verified name and  as identifiers.    Additional needs identified to be addressed with provider   No needs identified                 Method of communication with provider: none.    Care Summary Note: CTN spoke with patient who reported she is just very tired. Patient reported she feels she just gets tired very easily. Patient denied this being new just how she has felt since discharge from the hospital. Patient is scheduled for port placement on  and reported she has her pre-op instructions. Denies any acute needs at present time.  Agreeable to f/u calls.  Educated on the use of urgent care or physician’s 24 hr access line if assistance is needed after hours.        Plan of care updates since last contact:  Education: .       Advance Care Planning:   Does patient have an Advance Directive: reviewed during previous call, see note. .    Medication Review:  No changes since last call.     Remote Patient Monitoring:  Offered patient enrollment in the Remote Patient Monitoring (RPM) program for in-home monitoring: Patient is not eligible for RPM program because: patient does not have qualifying diagnosis.    Assessments:  Care Transitions Subsequent and Final Call    Subsequent and Final Calls  Have your medications changed?: No  Do you have any questions related to your medications?: No  Do you currently have any active services?: No  Do you have any needs or concerns that I can assist you with?: No  Identified Barriers: None  Care Transitions Interventions  Other Interventions:              Follow Up Appointment:   Reviewed upcoming appointment(s).  Future Appointments         Provider Specialty Dept Phone    2024 8:00 AM Luisana Gunter PA Family Medicine [phone]            Care Transition Nurse provided contact information.  Plan for follow-up call in 6-10 days

## 2024-11-19 ENCOUNTER — ANESTHESIA EVENT (OUTPATIENT)
Dept: OPERATING ROOM | Age: 69
End: 2024-11-19
Payer: MEDICARE

## 2024-11-20 ENCOUNTER — ANESTHESIA (OUTPATIENT)
Dept: OPERATING ROOM | Age: 69
End: 2024-11-20
Payer: MEDICARE

## 2024-11-20 ENCOUNTER — APPOINTMENT (OUTPATIENT)
Dept: GENERAL RADIOLOGY | Age: 69
DRG: 423 | End: 2024-11-20
Attending: SURGERY
Payer: MEDICARE

## 2024-11-20 ENCOUNTER — HOSPITAL ENCOUNTER (OUTPATIENT)
Age: 69
Setting detail: OUTPATIENT SURGERY
Discharge: HOME | DRG: 423 | End: 2024-11-20
Attending: SURGERY | Admitting: SURGERY
Payer: MEDICARE

## 2024-11-20 VITALS
SYSTOLIC BLOOD PRESSURE: 135 MMHG | TEMPERATURE: 97.7 F | BODY MASS INDEX: 31.92 KG/M2 | HEIGHT: 64 IN | RESPIRATION RATE: 14 BRPM | HEART RATE: 77 BPM | WEIGHT: 187 LBS | DIASTOLIC BLOOD PRESSURE: 64 MMHG | OXYGEN SATURATION: 97 %

## 2024-11-20 DIAGNOSIS — G89.18 POSTOPERATIVE PAIN: Primary | ICD-10-CM

## 2024-11-20 PROCEDURE — 76937 US GUIDE VASCULAR ACCESS: CPT | Performed by: SURGERY

## 2024-11-20 PROCEDURE — 36561 INSERT TUNNELED CV CATH: CPT | Performed by: SURGERY

## 2024-11-20 PROCEDURE — 2500000003 HC RX 250 WO HCPCS: Performed by: SURGERY

## 2024-11-20 PROCEDURE — 6360000002 HC RX W HCPCS: Performed by: SURGERY

## 2024-11-20 PROCEDURE — 2580000003 HC RX 258: Performed by: NURSE ANESTHETIST, CERTIFIED REGISTERED

## 2024-11-20 PROCEDURE — 2580000003 HC RX 258: Performed by: SURGERY

## 2024-11-20 PROCEDURE — 7100000010 HC PHASE II RECOVERY - FIRST 15 MIN: Performed by: SURGERY

## 2024-11-20 PROCEDURE — 3600000002 HC SURGERY LEVEL 2 BASE: Performed by: SURGERY

## 2024-11-20 PROCEDURE — 77001 FLUOROGUIDE FOR VEIN DEVICE: CPT | Performed by: SURGERY

## 2024-11-20 PROCEDURE — 3700000001 HC ADD 15 MINUTES (ANESTHESIA): Performed by: SURGERY

## 2024-11-20 PROCEDURE — 2500000003 HC RX 250 WO HCPCS: Performed by: NURSE ANESTHETIST, CERTIFIED REGISTERED

## 2024-11-20 PROCEDURE — 3600000012 HC SURGERY LEVEL 2 ADDTL 15MIN: Performed by: SURGERY

## 2024-11-20 PROCEDURE — 0JH60WZ INSERTION OF TOTALLY IMPLANTABLE VASCULAR ACCESS DEVICE INTO CHEST SUBCUTANEOUS TISSUE AND FASCIA, OPEN APPROACH: ICD-10-PCS | Performed by: SURGERY

## 2024-11-20 PROCEDURE — 77001 FLUOROGUIDE FOR VEIN DEVICE: CPT

## 2024-11-20 PROCEDURE — 2709999900 HC NON-CHARGEABLE SUPPLY: Performed by: SURGERY

## 2024-11-20 PROCEDURE — A4217 STERILE WATER/SALINE, 500 ML: HCPCS | Performed by: SURGERY

## 2024-11-20 PROCEDURE — 6360000002 HC RX W HCPCS: Performed by: NURSE ANESTHETIST, CERTIFIED REGISTERED

## 2024-11-20 PROCEDURE — 7100000011 HC PHASE II RECOVERY - ADDTL 15 MIN: Performed by: SURGERY

## 2024-11-20 PROCEDURE — 3700000000 HC ANESTHESIA ATTENDED CARE: Performed by: SURGERY

## 2024-11-20 PROCEDURE — 02HV33Z INSERTION OF INFUSION DEVICE INTO SUPERIOR VENA CAVA, PERCUTANEOUS APPROACH: ICD-10-PCS | Performed by: SURGERY

## 2024-11-20 PROCEDURE — C1788 PORT, INDWELLING, IMP: HCPCS | Performed by: SURGERY

## 2024-11-20 DEVICE — PORT INFUS SGL LUMN ATTCH POLYUR OPN END CATH 8FR POWERPRT: Type: IMPLANTABLE DEVICE | Site: CHEST | Status: FUNCTIONAL

## 2024-11-20 RX ORDER — SODIUM CHLORIDE 0.9 % (FLUSH) 0.9 %
5-40 SYRINGE (ML) INJECTION EVERY 12 HOURS SCHEDULED
Status: DISCONTINUED | OUTPATIENT
Start: 2024-11-20 | End: 2024-11-20 | Stop reason: HOSPADM

## 2024-11-20 RX ORDER — SODIUM CHLORIDE 9 MG/ML
INJECTION, SOLUTION INTRAVENOUS PRN
Status: DISCONTINUED | OUTPATIENT
Start: 2024-11-20 | End: 2024-11-20 | Stop reason: HOSPADM

## 2024-11-20 RX ORDER — SODIUM CHLORIDE 0.9 % (FLUSH) 0.9 %
5-40 SYRINGE (ML) INJECTION PRN
Status: DISCONTINUED | OUTPATIENT
Start: 2024-11-20 | End: 2024-11-20 | Stop reason: HOSPADM

## 2024-11-20 RX ORDER — LIDOCAINE HYDROCHLORIDE 20 MG/ML
INJECTION, SOLUTION INFILTRATION; PERINEURAL
Status: DISCONTINUED | OUTPATIENT
Start: 2024-11-20 | End: 2024-11-20 | Stop reason: SDUPTHER

## 2024-11-20 RX ORDER — SODIUM CHLORIDE 9 MG/ML
INJECTION, SOLUTION INTRAVENOUS
Status: DISCONTINUED | OUTPATIENT
Start: 2024-11-20 | End: 2024-11-20 | Stop reason: SDUPTHER

## 2024-11-20 RX ORDER — ACETAMINOPHEN AND CODEINE PHOSPHATE 300; 30 MG/1; MG/1
1 TABLET ORAL EVERY 4 HOURS PRN
Qty: 12 TABLET | Refills: 0 | Status: ON HOLD | OUTPATIENT
Start: 2024-11-20 | End: 2024-11-22 | Stop reason: HOSPADM

## 2024-11-20 RX ORDER — SODIUM CHLORIDE, SODIUM LACTATE, POTASSIUM CHLORIDE, CALCIUM CHLORIDE 600; 310; 30; 20 MG/100ML; MG/100ML; MG/100ML; MG/100ML
INJECTION, SOLUTION INTRAVENOUS CONTINUOUS
Status: DISCONTINUED | OUTPATIENT
Start: 2024-11-20 | End: 2024-11-20 | Stop reason: HOSPADM

## 2024-11-20 RX ORDER — MIDAZOLAM HYDROCHLORIDE 1 MG/ML
INJECTION, SOLUTION INTRAMUSCULAR; INTRAVENOUS
Status: DISCONTINUED | OUTPATIENT
Start: 2024-11-20 | End: 2024-11-20 | Stop reason: SDUPTHER

## 2024-11-20 RX ORDER — PHENYLEPHRINE HCL IN 0.9% NACL 1 MG/10 ML
SYRINGE (ML) INTRAVENOUS
Status: DISCONTINUED | OUTPATIENT
Start: 2024-11-20 | End: 2024-11-20 | Stop reason: SDUPTHER

## 2024-11-20 RX ORDER — MIDAZOLAM HYDROCHLORIDE 1 MG/ML
2 INJECTION, SOLUTION INTRAMUSCULAR; INTRAVENOUS
Status: DISCONTINUED | OUTPATIENT
Start: 2024-11-20 | End: 2024-11-20 | Stop reason: HOSPADM

## 2024-11-20 RX ORDER — LIDOCAINE HYDROCHLORIDE 10 MG/ML
1 INJECTION, SOLUTION EPIDURAL; INFILTRATION; INTRACAUDAL; PERINEURAL
Status: DISCONTINUED | OUTPATIENT
Start: 2024-11-20 | End: 2024-11-20 | Stop reason: HOSPADM

## 2024-11-20 RX ORDER — PROPOFOL 10 MG/ML
INJECTION, EMULSION INTRAVENOUS
Status: DISCONTINUED | OUTPATIENT
Start: 2024-11-20 | End: 2024-11-20 | Stop reason: SDUPTHER

## 2024-11-20 RX ORDER — FENTANYL CITRATE 50 UG/ML
INJECTION, SOLUTION INTRAMUSCULAR; INTRAVENOUS
Status: DISCONTINUED | OUTPATIENT
Start: 2024-11-20 | End: 2024-11-20 | Stop reason: SDUPTHER

## 2024-11-20 RX ADMIN — Medication 100 MCG: at 08:28

## 2024-11-20 RX ADMIN — MIDAZOLAM 2 MG: 1 INJECTION INTRAMUSCULAR; INTRAVENOUS at 08:09

## 2024-11-20 RX ADMIN — SODIUM CHLORIDE: 9 INJECTION, SOLUTION INTRAVENOUS at 08:44

## 2024-11-20 RX ADMIN — CEFAZOLIN 2000 MG: 2 INJECTION, POWDER, FOR SOLUTION INTRAVENOUS at 08:15

## 2024-11-20 RX ADMIN — FENTANYL CITRATE 25 MCG: 50 INJECTION, SOLUTION INTRAMUSCULAR; INTRAVENOUS at 08:39

## 2024-11-20 RX ADMIN — LIDOCAINE HYDROCHLORIDE 60 MG: 20 INJECTION, SOLUTION INFILTRATION; PERINEURAL at 08:20

## 2024-11-20 RX ADMIN — SODIUM CHLORIDE: 9 INJECTION, SOLUTION INTRAVENOUS at 08:09

## 2024-11-20 RX ADMIN — Medication 200 MCG: at 08:39

## 2024-11-20 RX ADMIN — FENTANYL CITRATE 50 MCG: 50 INJECTION, SOLUTION INTRAMUSCULAR; INTRAVENOUS at 08:18

## 2024-11-20 RX ADMIN — PROPOFOL 20 MG: 10 INJECTION, EMULSION INTRAVENOUS at 08:21

## 2024-11-20 RX ADMIN — PROPOFOL 75 MCG/KG/MIN: 10 INJECTION, EMULSION INTRAVENOUS at 08:21

## 2024-11-20 RX ADMIN — Medication 200 MCG: at 08:55

## 2024-11-20 RX ADMIN — Medication 100 MCG: at 08:44

## 2024-11-20 ASSESSMENT — PAIN - FUNCTIONAL ASSESSMENT
PAIN_FUNCTIONAL_ASSESSMENT: ACTIVITIES ARE NOT PREVENTED
PAIN_FUNCTIONAL_ASSESSMENT: 0-10

## 2024-11-20 ASSESSMENT — PAIN DESCRIPTION - DESCRIPTORS: DESCRIPTORS: ACHING

## 2024-11-20 NOTE — ANESTHESIA PRE PROCEDURE
Department of Anesthesiology  Preprocedure Note       Name:  Georgi Ryan   Age:  69 y.o.  :  1955                                          MRN:  [id number]         Date:  2024      Surgeon: Surgeon(s):  Yovani Cooney MD    Procedure: Procedure(s):  SURGICAL PORT PLACEMENT    Medications prior to admission:   Prior to Admission medications    Medication Sig Start Date End Date Taking? Authorizing Provider   furosemide (LASIX) 20 MG tablet Take 1 tablet by mouth daily 24  Yes Provider, MD Cally   ondansetron (ZOFRAN-ODT) 4 MG disintegrating tablet Take 1 tablet by mouth every 8 hours as needed for Nausea or Vomiting 24  Yes Faina Koch, APRN - ANGELA   Handicap Placard MISC by Does not apply route Exp: 2028  Yes Luisana Gunter PA       Current medications:    Current Facility-Administered Medications   Medication Dose Route Frequency Provider Last Rate Last Admin    sodium chloride flush 0.9 % injection 5-40 mL  5-40 mL IntraVENous 2 times per day Yovani Cooney MD        sodium chloride flush 0.9 % injection 5-40 mL  5-40 mL IntraVENous PRN Yovani Cooney MD        0.9 % sodium chloride infusion   IntraVENous PRN Yovani Cooney MD        ceFAZolin (ANCEF) 2,000 mg in sterile water 20 mL IV syringe  2,000 mg IntraVENous On Call to OR Cooney, Yovani Aelxandre MD        lidocaine PF 1 % injection 1 mL  1 mL IntraDERmal Once PRN Aron Reese MD        lactated ringers infusion   IntraVENous Continuous Aron Reese MD        sodium chloride flush 0.9 % injection 5-40 mL  5-40 mL IntraVENous 2 times per day Aron Reese MD        sodium chloride flush 0.9 % injection 5-40 mL  5-40 mL IntraVENous PRN Aron Reese MD        0.9 % sodium chloride infusion   IntraVENous PRN Aron Reese MD        midazolam (VERSED) injection 2 mg  2 mg IntraVENous Once PRN Aron Reese MD

## 2024-11-20 NOTE — OP NOTE
Operative Note      Patient: Georgi Ryan  YOB: 1955  MRN: [id number]    Date of Procedure: 11/20/2024    Pre-Op Diagnosis Codes:      * Colon cancer, ascending (HCC) [C18.2]     * Gallbladder carcinoma (HCC) [C23]    Post-Op Diagnosis: Same       Procedure(s):  SURGICAL PORT PLACEMENT    Surgeon(s):  Yovani Cooney MD    Assistant:   Surgical Assistant: Elias Perea    Anesthesia: Monitor Anesthesia Care    Estimated Blood Loss (mL): less than 50     Complications: None    Specimens:   * No specimens in log *    Implants:  Implant Name Type Inv. Item Serial No.  Lot No. LRB No. Used Action   PORT INFUS SGL LUMN ATTCH POLYUR OPN END CATH 8FR POWERPRT - [id number]  PORT INFUS SGL LUMN ATTCH POLYUR OPN END CATH 8FR POWERPRT  [employer] [id number] Implanted         Drains: * No LDAs found *    Findings:  Infection Present At Time Of Surgery (PATOS) (choose all levels that have infection present):  No infection present  Other Findings: R IJ portacath, tip in mid-SVC    Detailed Description of Procedure:         PROCEDURE IN DETAIL: The patient was taken to the operative suite and was placed on the table in a supine position, the arms pulled downward, and the head turned to the left. The neck, chest, and shoulders were prepped with Betadine and draped in a sterile fashion. Using the portable ultrasound, the right internal jugular vein was readily identified.  Local anesthesia was infiltrated over the vein.  The jugular vein was then accessed without difficulty under ultrasound guidance.  The guidewire passed readily into vein.  Fluoroscopy confirmed the wire to be passing down the vena cava past the heart.     A 3cm incision was now made on the chest wall below the junction of the middle and lateral thirds of the clavicle after infiltrating with local anesthesia.  Dissection was carried down to the pectoral fascia and a pocket created along the fascia.

## 2024-11-20 NOTE — PROGRESS NOTES
Patient admitted to preop bay 7. Consents verified. Patient NPO since 1930. Patient belongings to remain in preop bay 7 for procedure.

## 2024-11-20 NOTE — H&P
I have reviewed the H&P and examined this patient. I have reviewed with the patient and /or family the risks, benefits and alternatives to this procedure and they seem to understand and agree to proceed.  HETAL FARIAS MD

## 2024-11-20 NOTE — ANESTHESIA POSTPROCEDURE EVALUATION
Department of Anesthesiology  Postprocedure Note    Patient: Georgi Ryan  MRN: [id number]  YOB: 1955  Date of evaluation: 11/20/2024    Procedure Summary       Date: 11/20/24 Room / Location: [address]    Anesthesia Start: 0809 Anesthesia Stop: 0925    Procedure: SURGICAL PORT PLACEMENT (Chest) Diagnosis:       Colon cancer, ascending (HCC)      Gallbladder carcinoma (HCC)      (Colon cancer, ascending (HCC) [C18.2])      (Gallbladder carcinoma (HCC) [C23])    Surgeons: Yovani Cooney MD Responsible Provider: Devonte Benito MD    Anesthesia Type: general ASA Status: 2            Anesthesia Type: No value filed.    Katia Phase I: Katia Score: 10    Katia Phase II: Katia Score: 9    Anesthesia Post Evaluation    Comments: Anes Post-op Note    Name:    Goergi Ryan  MRN:      [id number]    Patient Vitals in the past 12 hrs:  11/20/24 0957, BP:135/64, Pulse:77, SpO2:97 %  11/20/24 0931, BP:128/68, Pulse:73, SpO2:98 %  11/20/24 0922, BP:138/68, Temp:97.7 °F (36.5 °C), Temp src:Oral, Pulse:74, Resp:14, SpO2:98 %  11/20/24 0655, BP:(!) 131/59, Temp:98 °F (36.7 °C), Temp src:Oral, Pulse:93, Resp:16, SpO2:97 %     LABS:    CBC  Lab Results       Component                Value               Date/Time                  WBC                      18.6 (H)            11/07/2024 08:10 AM        HGB                      8.5 (L)             11/07/2024 08:10 AM        HCT                      26.1 (L)            11/07/2024 08:10 AM        PLT                      372                 11/07/2024 08:10 AM   RENAL  Lab Results       Component                Value               Date/Time                  NA                       137                 11/02/2024 08:52 AM        K                        3.6                 11/02/2024 08:52 AM        K                        3.7                 11/01/2024 05:30 AM        CL                       100                 11/02/2024

## 2024-11-20 NOTE — DISCHARGE INSTRUCTIONS
[facility]    Yovani Cooney M.D.   [facility] Office      [facility] Office          [facility]               [address]                [address]  Miah Infante M.D.              [address]           [address]            [place] [address]         [place] [address]  Jose Guerra M.D                         [phone]          [facility]                   Aron Eaton M.D.            [facility]      POST-OPERATIVE INSTRUCTIONS FOLLOWING MEDIPORT INSERTION    Call the office to if you have questions or concerns.     You will have surgical glue closing your incisions.        You may shower.  Wash incision gently, and pat dry. Do not rub your incisions.    Do NOT drive for one week and while taking your narcotic pain medicine.    You will have pain medicine ordered. Take as directed.     Watch for signs of infection:    Fever over 100.5°     Excessive warmth or bright redness around your incisions   Leakage of bloody or cloudy fluid from you incisions  ANESTHESIA DISCHARGE INSTRUCTIONS    You are under the influence of drugs- do not drink alcohol, drive a car, operate machinery(such as power tools, kitchen appliances, etc), sign legal documents, or make any important decisions for 24 hours (or while on pain medications).   Children should not ride bikes or skate boards or play on gym sets  for 24 hours after surgery.  A responsible adult should be with you for 24 hours.  Rest at home today- increase activity as tolerated.  Progress slowly to a regular diet unless your physician has instructed you otherwise. Drink plenty of water.    CALL YOUR DOCTOR IF YOU:  Have moderate to severe nausea or vomiting AND are unable to hold down fluids or prescribed medications.  Have bright red bloody drainage from your dressing that won't stop oozing.  Do not get relief with your pain  Infection or  (SSI)?        A surgical site infection (SSI) is an infection that occurs after surgery in the part of the body where the surgery took place. Most patients who have surgery do not develop an infection. However, infections can develop in about 1-3 cases for every 100 patients who have had surgery.  Our goal is for you to NOT experience any complications and be completely satisfied with your care!    However, some signs or symptoms to look for and report immediately to your doctor are:   1. Fever above 101 degrees    2. Redness and increasing pain around the area  where you had surgery   3. Drainage of cloudy fluid or pus coming from the surgical area    Some of the things we/ you can do to prevent SSI's are:   1. Clean hands with soap and water or an alcohol-based hand rub before and after caring for the operative area. This occurs the day of surgery and for the next 2 weeks.   2.Sometimes you receive an appropriate antibiotic within 60 minutes before your surgery or take one for several days after surgery depending on your surgeon's instructions and/or the type of surgery you are having.    3. Family and/or friends who visit you should NOT touch the surgical wound or dressings until advised by your surgeon.    4. Be sure to elevate and decrease the swelling after your surgery to help prevent infection.   5. If you are a diabetic, you need to closely monitor your blood sugar levels and report any significant increases or changes to your surgeon to help promote the healing process.

## 2024-11-20 NOTE — PROGRESS NOTES
Discharge instructions given to pt and family. Verbalized understanding. PIV removed. Pt dressed and wheeled out and discharged to the care of their family in stable condition.

## 2024-11-21 ENCOUNTER — APPOINTMENT (OUTPATIENT)
Dept: INTERVENTIONAL RADIOLOGY/VASCULAR | Age: 69
DRG: 423 | End: 2024-11-21
Attending: EMERGENCY MEDICINE
Payer: MEDICARE

## 2024-11-21 ENCOUNTER — APPOINTMENT (OUTPATIENT)
Dept: ULTRASOUND IMAGING | Age: 69
DRG: 423 | End: 2024-11-21
Payer: MEDICARE

## 2024-11-21 ENCOUNTER — CARE COORDINATION (OUTPATIENT)
Dept: CASE MANAGEMENT | Age: 69
End: 2024-11-21

## 2024-11-21 ENCOUNTER — HOSPITAL ENCOUNTER (INPATIENT)
Age: 69
LOS: 1 days | Discharge: HOSPICE | DRG: 423 | End: 2024-11-22
Attending: EMERGENCY MEDICINE | Admitting: INTERNAL MEDICINE
Payer: MEDICARE

## 2024-11-21 ENCOUNTER — APPOINTMENT (OUTPATIENT)
Dept: CT IMAGING | Age: 69
DRG: 423 | End: 2024-11-21
Payer: MEDICARE

## 2024-11-21 DIAGNOSIS — E87.6 HYPOKALEMIA: ICD-10-CM

## 2024-11-21 DIAGNOSIS — C80.1 OBSTRUCTIVE JAUNDICE DUE TO CANCER (HCC): Primary | ICD-10-CM

## 2024-11-21 DIAGNOSIS — K83.1 OBSTRUCTIVE JAUNDICE DUE TO CANCER (HCC): Primary | ICD-10-CM

## 2024-11-21 DIAGNOSIS — E83.52 HYPERCALCEMIA: ICD-10-CM

## 2024-11-21 DIAGNOSIS — C23 GALLBLADDER CARCINOMA (HCC): ICD-10-CM

## 2024-11-21 PROBLEM — R17 JAUNDICE: Status: ACTIVE | Noted: 2024-11-21

## 2024-11-21 LAB
ALBUMIN SERPL-MCNC: 2.2 G/DL (ref 3.4–5)
ALBUMIN/GLOB SERPL: 0.8 {RATIO} (ref 1.1–2.2)
ALP SERPL-CCNC: 1481 U/L (ref 40–129)
ALT SERPL-CCNC: 142 U/L (ref 10–40)
AMMONIA PLAS-SCNC: 50 UMOL/L (ref 11–51)
ANION GAP SERPL CALCULATED.3IONS-SCNC: 13 MMOL/L (ref 3–16)
ANISOCYTOSIS BLD QL SMEAR: ABNORMAL
AST SERPL-CCNC: 197 U/L (ref 15–37)
BASOPHILS # BLD: 0 K/UL (ref 0–0.2)
BASOPHILS NFR BLD: 0 %
BILIRUB DIRECT SERPL-MCNC: 16.7 MG/DL (ref 0–0.3)
BILIRUB INDIRECT SERPL-MCNC: 3.1 MG/DL (ref 0–1)
BILIRUB SERPL-MCNC: 19.8 MG/DL (ref 0–1)
BILIRUB UR QL STRIP.AUTO: ABNORMAL
BUN SERPL-MCNC: 33 MG/DL (ref 7–20)
CALCIUM SERPL-MCNC: 14 MG/DL (ref 8.3–10.6)
CHLORIDE SERPL-SCNC: 102 MMOL/L (ref 99–110)
CLARITY UR: CLEAR
CO2 SERPL-SCNC: 25 MMOL/L (ref 21–32)
COLOR UR: ABNORMAL
CREAT SERPL-MCNC: 1.1 MG/DL (ref 0.6–1.2)
DEPRECATED RDW RBC AUTO: 17.1 % (ref 12.4–15.4)
DOHLE BOD BLD QL SMEAR: PRESENT
EOSINOPHIL # BLD: 0.1 K/UL (ref 0–0.6)
EOSINOPHIL NFR BLD: 1 %
GFR SERPLBLD CREATININE-BSD FMLA CKD-EPI: 55 ML/MIN/{1.73_M2}
GLUCOSE SERPL-MCNC: 95 MG/DL (ref 70–99)
GLUCOSE UR STRIP.AUTO-MCNC: 100 MG/DL
HCT VFR BLD AUTO: 32 % (ref 36–48)
HGB BLD-MCNC: 10.3 G/DL (ref 12–16)
HGB UR QL STRIP.AUTO: NEGATIVE
INR PPP: 1.14 (ref 0.85–1.15)
KETONES UR STRIP.AUTO-MCNC: NEGATIVE MG/DL
LACTATE BLDV-SCNC: 1.8 MMOL/L (ref 0.4–2)
LEUKOCYTE ESTERASE UR QL STRIP.AUTO: NEGATIVE
LIPASE SERPL-CCNC: 199 U/L (ref 13–60)
LYMPHOCYTES # BLD: 0.8 K/UL (ref 1–5.1)
LYMPHOCYTES NFR BLD: 11 %
MAGNESIUM SERPL-MCNC: 2.47 MG/DL (ref 1.8–2.4)
MCH RBC QN AUTO: 29.1 PG (ref 26–34)
MCHC RBC AUTO-ENTMCNC: 32.1 G/DL (ref 31–36)
MCV RBC AUTO: 90.7 FL (ref 80–100)
METAMYELOCYTES NFR BLD MANUAL: 2 %
MICROCYTES BLD QL SMEAR: ABNORMAL
MONOCYTES # BLD: 0.8 K/UL (ref 0–1.3)
MONOCYTES NFR BLD: 10 %
NEUTROPHILS # BLD: 5.9 K/UL (ref 1.7–7.7)
NEUTROPHILS NFR BLD: 70 %
NEUTS BAND NFR BLD MANUAL: 6 % (ref 0–7)
NEUTS VAC BLD QL SMEAR: PRESENT
NITRITE UR QL STRIP.AUTO: NEGATIVE
OVALOCYTES BLD QL SMEAR: ABNORMAL
PH UR STRIP.AUTO: 6 [PH] (ref 5–8)
PLATELET # BLD AUTO: 289 K/UL (ref 135–450)
PLATELET BLD QL SMEAR: ADEQUATE
PMV BLD AUTO: 9 FL (ref 5–10.5)
POIKILOCYTOSIS BLD QL SMEAR: ABNORMAL
POLYCHROMASIA BLD QL SMEAR: ABNORMAL
POTASSIUM SERPL-SCNC: 2.7 MMOL/L (ref 3.5–5.1)
PROT SERPL-MCNC: 5.1 G/DL (ref 6.4–8.2)
PROT UR STRIP.AUTO-MCNC: NEGATIVE MG/DL
PROTHROMBIN TIME: 14.8 SEC (ref 11.9–14.9)
RBC # BLD AUTO: 3.54 M/UL (ref 4–5.2)
SLIDE REVIEW: ABNORMAL
SODIUM SERPL-SCNC: 140 MMOL/L (ref 136–145)
SP GR UR STRIP.AUTO: 1.02 (ref 1–1.03)
SPHEROCYTES BLD QL SMEAR: ABNORMAL
STOMATOCYTES BLD QL SMEAR: ABNORMAL
TARGETS BLD QL SMEAR: ABNORMAL
TOXIC GRANULES BLD QL SMEAR: PRESENT
UA COMPLETE W REFLEX CULTURE PNL UR: ABNORMAL
UA DIPSTICK W REFLEX MICRO PNL UR: ABNORMAL
URN SPEC COLLECT METH UR: ABNORMAL
UROBILINOGEN UR STRIP-ACNC: 0.2 E.U./DL
WBC # BLD AUTO: 7.5 K/UL (ref 4–11)

## 2024-11-21 PROCEDURE — 6360000002 HC RX W HCPCS: Performed by: RADIOLOGY

## 2024-11-21 PROCEDURE — C1887 CATHETER, GUIDING: HCPCS

## 2024-11-21 PROCEDURE — 81003 URINALYSIS AUTO W/O SCOPE: CPT

## 2024-11-21 PROCEDURE — 82248 BILIRUBIN DIRECT: CPT

## 2024-11-21 PROCEDURE — 83690 ASSAY OF LIPASE: CPT

## 2024-11-21 PROCEDURE — 83735 ASSAY OF MAGNESIUM: CPT

## 2024-11-21 PROCEDURE — 74177 CT ABD & PELVIS W/CONTRAST: CPT

## 2024-11-21 PROCEDURE — 6360000004 HC RX CONTRAST MEDICATION

## 2024-11-21 PROCEDURE — 96365 THER/PROPH/DIAG IV INF INIT: CPT

## 2024-11-21 PROCEDURE — 85025 COMPLETE CBC W/AUTO DIFF WBC: CPT

## 2024-11-21 PROCEDURE — 1200000000 HC SEMI PRIVATE

## 2024-11-21 PROCEDURE — 83605 ASSAY OF LACTIC ACID: CPT

## 2024-11-21 PROCEDURE — 2580000003 HC RX 258: Performed by: RADIOLOGY

## 2024-11-21 PROCEDURE — 96375 TX/PRO/DX INJ NEW DRUG ADDON: CPT

## 2024-11-21 PROCEDURE — 99152 MOD SED SAME PHYS/QHP 5/>YRS: CPT

## 2024-11-21 PROCEDURE — 76942 ECHO GUIDE FOR BIOPSY: CPT

## 2024-11-21 PROCEDURE — 6360000002 HC RX W HCPCS: Performed by: INTERNAL MEDICINE

## 2024-11-21 PROCEDURE — 2580000003 HC RX 258

## 2024-11-21 PROCEDURE — 80053 COMPREHEN METABOLIC PANEL: CPT

## 2024-11-21 PROCEDURE — BF101ZZ FLUOROSCOPY OF BILE DUCTS USING LOW OSMOLAR CONTRAST: ICD-10-PCS | Performed by: RADIOLOGY

## 2024-11-21 PROCEDURE — 82140 ASSAY OF AMMONIA: CPT

## 2024-11-21 PROCEDURE — 6360000002 HC RX W HCPCS: Performed by: EMERGENCY MEDICINE

## 2024-11-21 PROCEDURE — 85610 PROTHROMBIN TIME: CPT

## 2024-11-21 PROCEDURE — 94761 N-INVAS EAR/PLS OXIMETRY MLT: CPT

## 2024-11-21 PROCEDURE — 2580000003 HC RX 258: Performed by: INTERNAL MEDICINE

## 2024-11-21 PROCEDURE — 99285 EMERGENCY DEPT VISIT HI MDM: CPT

## 2024-11-21 PROCEDURE — 47533 PLMT BILIARY DRAINAGE CATH: CPT

## 2024-11-21 PROCEDURE — 6360000002 HC RX W HCPCS

## 2024-11-21 PROCEDURE — 2500000003 HC RX 250 WO HCPCS: Performed by: RADIOLOGY

## 2024-11-21 PROCEDURE — 0F9630Z DRAINAGE OF LEFT HEPATIC DUCT WITH DRAINAGE DEVICE, PERCUTANEOUS APPROACH: ICD-10-PCS | Performed by: RADIOLOGY

## 2024-11-21 PROCEDURE — 2700000000 HC OXYGEN THERAPY PER DAY

## 2024-11-21 PROCEDURE — 2580000003 HC RX 258: Performed by: EMERGENCY MEDICINE

## 2024-11-21 PROCEDURE — 6360000004 HC RX CONTRAST MEDICATION: Performed by: RADIOLOGY

## 2024-11-21 RX ORDER — SODIUM CHLORIDE 0.9 % (FLUSH) 0.9 %
5-40 SYRINGE (ML) INJECTION PRN
Status: DISCONTINUED | OUTPATIENT
Start: 2024-11-21 | End: 2024-11-22 | Stop reason: HOSPADM

## 2024-11-21 RX ORDER — SODIUM CHLORIDE 9 MG/ML
INJECTION, SOLUTION INTRAVENOUS CONTINUOUS
Status: DISCONTINUED | OUTPATIENT
Start: 2024-11-21 | End: 2024-11-22 | Stop reason: HOSPADM

## 2024-11-21 RX ORDER — LORAZEPAM 2 MG/ML
0.5 INJECTION INTRAMUSCULAR
Status: DISCONTINUED | OUTPATIENT
Start: 2024-11-21 | End: 2024-11-22 | Stop reason: HOSPADM

## 2024-11-21 RX ORDER — IOPAMIDOL 755 MG/ML
75 INJECTION, SOLUTION INTRAVASCULAR
Status: COMPLETED | OUTPATIENT
Start: 2024-11-21 | End: 2024-11-21

## 2024-11-21 RX ORDER — MORPHINE SULFATE 2 MG/ML
2 INJECTION, SOLUTION INTRAMUSCULAR; INTRAVENOUS
Status: DISCONTINUED | OUTPATIENT
Start: 2024-11-21 | End: 2024-11-22 | Stop reason: HOSPADM

## 2024-11-21 RX ORDER — FENTANYL CITRATE 50 UG/ML
100 INJECTION, SOLUTION INTRAMUSCULAR; INTRAVENOUS
Status: DISCONTINUED | OUTPATIENT
Start: 2024-11-21 | End: 2024-11-21

## 2024-11-21 RX ORDER — POTASSIUM CHLORIDE 7.45 MG/ML
10 INJECTION INTRAVENOUS
Status: COMPLETED | OUTPATIENT
Start: 2024-11-21 | End: 2024-11-22

## 2024-11-21 RX ORDER — MIDAZOLAM HYDROCHLORIDE 5 MG/ML
INJECTION, SOLUTION INTRAMUSCULAR; INTRAVENOUS PRN
Status: COMPLETED | OUTPATIENT
Start: 2024-11-21 | End: 2024-11-21

## 2024-11-21 RX ORDER — ENOXAPARIN SODIUM 100 MG/ML
40 INJECTION SUBCUTANEOUS DAILY
Status: DISCONTINUED | OUTPATIENT
Start: 2024-11-21 | End: 2024-11-22 | Stop reason: HOSPADM

## 2024-11-21 RX ORDER — MORPHINE SULFATE 4 MG/ML
4 INJECTION, SOLUTION INTRAMUSCULAR; INTRAVENOUS
Status: DISCONTINUED | OUTPATIENT
Start: 2024-11-21 | End: 2024-11-22 | Stop reason: HOSPADM

## 2024-11-21 RX ORDER — SODIUM CHLORIDE 9 MG/ML
1000 INJECTION, SOLUTION INTRAVENOUS CONTINUOUS
Status: DISCONTINUED | OUTPATIENT
Start: 2024-11-21 | End: 2024-11-22

## 2024-11-21 RX ORDER — SODIUM CHLORIDE 9 MG/ML
INJECTION, SOLUTION INTRAVENOUS PRN
Status: DISCONTINUED | OUTPATIENT
Start: 2024-11-21 | End: 2024-11-22 | Stop reason: HOSPADM

## 2024-11-21 RX ORDER — POLYETHYLENE GLYCOL 3350 17 G/17G
17 POWDER, FOR SOLUTION ORAL DAILY PRN
Status: DISCONTINUED | OUTPATIENT
Start: 2024-11-21 | End: 2024-11-21

## 2024-11-21 RX ORDER — LIDOCAINE HYDROCHLORIDE 10 MG/ML
INJECTION, SOLUTION EPIDURAL; INFILTRATION; INTRACAUDAL; PERINEURAL PRN
Status: COMPLETED | OUTPATIENT
Start: 2024-11-21 | End: 2024-11-21

## 2024-11-21 RX ORDER — FENTANYL CITRATE 50 UG/ML
INJECTION, SOLUTION INTRAMUSCULAR; INTRAVENOUS PRN
Status: COMPLETED | OUTPATIENT
Start: 2024-11-21 | End: 2024-11-21

## 2024-11-21 RX ORDER — ONDANSETRON 2 MG/ML
4 INJECTION INTRAMUSCULAR; INTRAVENOUS EVERY 6 HOURS PRN
Status: DISCONTINUED | OUTPATIENT
Start: 2024-11-21 | End: 2024-11-22 | Stop reason: HOSPADM

## 2024-11-21 RX ORDER — ONDANSETRON 4 MG/1
4 TABLET, ORALLY DISINTEGRATING ORAL EVERY 8 HOURS PRN
Status: DISCONTINUED | OUTPATIENT
Start: 2024-11-21 | End: 2024-11-22 | Stop reason: HOSPADM

## 2024-11-21 RX ORDER — PANTOPRAZOLE SODIUM 40 MG/10ML
40 INJECTION, POWDER, LYOPHILIZED, FOR SOLUTION INTRAVENOUS DAILY
Status: DISCONTINUED | OUTPATIENT
Start: 2024-11-21 | End: 2024-11-22 | Stop reason: HOSPADM

## 2024-11-21 RX ORDER — SODIUM CHLORIDE 0.9 % (FLUSH) 0.9 %
5-40 SYRINGE (ML) INJECTION EVERY 12 HOURS SCHEDULED
Status: DISCONTINUED | OUTPATIENT
Start: 2024-11-21 | End: 2024-11-22 | Stop reason: HOSPADM

## 2024-11-21 RX ADMIN — ZOLEDRONIC ACID 4 MG: 4 INJECTION, SOLUTION, CONCENTRATE INTRAVENOUS at 11:03

## 2024-11-21 RX ADMIN — FENTANYL CITRATE 25 MCG: 50 INJECTION, SOLUTION INTRAMUSCULAR; INTRAVENOUS at 13:11

## 2024-11-21 RX ADMIN — IOPAMIDOL 75 ML: 755 INJECTION, SOLUTION INTRAVENOUS at 10:30

## 2024-11-21 RX ADMIN — LIDOCAINE HYDROCHLORIDE 9 ML: 10 INJECTION, SOLUTION EPIDURAL; INFILTRATION; INTRACAUDAL; PERINEURAL at 14:02

## 2024-11-21 RX ADMIN — MIDAZOLAM HYDROCHLORIDE 0.5 MG: 5 INJECTION, SOLUTION INTRAMUSCULAR; INTRAVENOUS at 13:58

## 2024-11-21 RX ADMIN — IOVERSOL 90 ML: 678 INJECTION INTRA-ARTERIAL; INTRAVENOUS at 14:01

## 2024-11-21 RX ADMIN — POTASSIUM CHLORIDE 10 MEQ: 7.46 INJECTION, SOLUTION INTRAVENOUS at 16:08

## 2024-11-21 RX ADMIN — SODIUM CHLORIDE, PRESERVATIVE FREE 10 ML: 5 INJECTION INTRAVENOUS at 20:13

## 2024-11-21 RX ADMIN — MIDAZOLAM HYDROCHLORIDE 0.5 MG: 5 INJECTION, SOLUTION INTRAMUSCULAR; INTRAVENOUS at 13:12

## 2024-11-21 RX ADMIN — SODIUM CHLORIDE 1000 ML: 9 INJECTION, SOLUTION INTRAVENOUS at 10:08

## 2024-11-21 RX ADMIN — FENTANYL CITRATE 25 MCG: 50 INJECTION, SOLUTION INTRAMUSCULAR; INTRAVENOUS at 13:58

## 2024-11-21 RX ADMIN — POTASSIUM CHLORIDE 10 MEQ: 7.46 INJECTION, SOLUTION INTRAVENOUS at 17:11

## 2024-11-21 RX ADMIN — POTASSIUM CHLORIDE 10 MEQ: 7.46 INJECTION, SOLUTION INTRAVENOUS at 18:56

## 2024-11-21 RX ADMIN — FENTANYL CITRATE 100 MCG: 50 INJECTION INTRAMUSCULAR; INTRAVENOUS at 09:16

## 2024-11-21 RX ADMIN — CEFTRIAXONE SODIUM 1000 MG: 1 INJECTION, POWDER, FOR SOLUTION INTRAMUSCULAR; INTRAVENOUS at 11:24

## 2024-11-21 RX ADMIN — POTASSIUM CHLORIDE 10 MEQ: 7.46 INJECTION, SOLUTION INTRAVENOUS at 20:10

## 2024-11-21 RX ADMIN — PANTOPRAZOLE SODIUM 40 MG: 40 INJECTION, POWDER, FOR SOLUTION INTRAVENOUS at 15:51

## 2024-11-21 RX ADMIN — SODIUM CHLORIDE: 9 INJECTION, SOLUTION INTRAVENOUS at 15:26

## 2024-11-21 RX ADMIN — LIDOCAINE HYDROCHLORIDE 8 ML: 10 INJECTION, SOLUTION EPIDURAL; INFILTRATION; INTRACAUDAL; PERINEURAL at 12:31

## 2024-11-21 RX ADMIN — MIDAZOLAM HYDROCHLORIDE 1 MG: 5 INJECTION, SOLUTION INTRAMUSCULAR; INTRAVENOUS at 12:30

## 2024-11-21 RX ADMIN — FENTANYL CITRATE 50 MCG: 50 INJECTION, SOLUTION INTRAMUSCULAR; INTRAVENOUS at 12:29

## 2024-11-21 ASSESSMENT — PAIN SCALES - GENERAL
PAINLEVEL_OUTOF10: 0
PAINLEVEL_OUTOF10: 8
PAINLEVEL_OUTOF10: 8

## 2024-11-21 ASSESSMENT — PAIN - FUNCTIONAL ASSESSMENT: PAIN_FUNCTIONAL_ASSESSMENT: 0-10

## 2024-11-21 ASSESSMENT — PAIN DESCRIPTION - PAIN TYPE: TYPE: ACUTE PAIN

## 2024-11-21 ASSESSMENT — PAIN DESCRIPTION - ONSET: ONSET: ON-GOING

## 2024-11-21 ASSESSMENT — PAIN DESCRIPTION - FREQUENCY: FREQUENCY: CONTINUOUS

## 2024-11-21 ASSESSMENT — PAIN DESCRIPTION - LOCATION: LOCATION: BACK

## 2024-11-21 NOTE — ED NOTES
Pt arrived with port accessed from yesterday. Dressing not fully covering port insertion site. Tried to flush port with NS, but unable to flush. Per Dr. Junior, de-access port. Port de-accessed, band-aid placed. Site C/D/I.

## 2024-11-21 NOTE — ED PROVIDER NOTES
EMERGENCY DEPARTMENT ENCOUNTER     [place] [facility] TELE/MED SURG/ONC     Pt Name: Georgi Ryan   MRN: [id number]   Birthdate 1955   Date of evaluation: 2024   Provider: Antelmo Junior MD   PCP: Luisana Gunter PA   Note Started: 1:32 PM EST 24     CHIEF COMPLAINT     Chief Complaint   Patient presents with    Fatigue     Pt diagnosed with liver cancer at the end of September. Pt had port placed yesterday. Pt states she has been jaundiced three weeks. Pt c/o fatigue. Supposed to get first dose of chemo today, but MD sent for further eval.    Jaundice        HISTORY OF PRESENT ILLNESS:  History from : Patient and Family      Limitations to history : None     Georgi Ryan is a 69 y.o. female who presents for weakness.  Patient reports she feels too weak to do any activities which is reason for presentation.  She has cancer mets in the liver which have caused her to become jaundiced.  She states she has a common bile duct stent which is not working.  She denies any chest pain or shortness of breath.  No fevers, chills or sweats.  She follows with Dr. Goncalves of oncology.  She had a port placed for administration of palliative chemo.  The port was placed yesterday.    Nursing Notes were all reviewed and agreed with or any disagreements were addressed in the HPI.     ROS: Positives and Pertinent negatives as per HPI.    PAST MEDICAL HISTORY     Past medical history:  has a past medical history of Anemia, Awareness under anesthesia, Cancer (HCC), History of scarlet fever, Liver lesion (10/31/2024), PONV (postoperative nausea and vomiting), and Wears glasses.    Past surgical history:  has a past surgical history that includes knee surgery;  section; Upper gastrointestinal endoscopy (N/A, 2021); Colonoscopy (N/A, 2021); Colonoscopy (2021); Small intestine surgery (Right, 2021); other surgical history (Right); CT NEEDLE BIOPSY LIVER PERCUTANEOUS (10/18/2024); ERCP

## 2024-11-21 NOTE — OR NURSING
Pt arrived for image guided biliary drain  insertion right . Procedure explained including the risk and benefits of the procedure. All questions answered. Pt verbalizes understanding of the procedure and states no more questions. Consent confirmed. Vital signs stable. Labs, allergies, medications, and code status reviewed. No contraindications noted.     Vital Signs  Vitals:    11/21/24 1228   BP: (!) 136/59   Pulse: 90   Resp: 21   Temp:    SpO2: 97%    (vital signs in table format)    Pre Katia Score  2 - Able to move 4 extremities voluntarily on command  2 - BP+/- 20mmHg of normal  2 - Fully awake  2 - Able to maintain oxygen saturation >92% on room air  2 - Able to breathe deeply and cough freely    Allergies  Novocain [procaine] (allergies)    Labs  Lab Results   Component Value Date    INR 1.14 11/21/2024    PROTIME 14.8 11/21/2024     Lab Results   Component Value Date    CREATININE 1.1 11/21/2024    BUN 33 (H) 11/21/2024     11/21/2024    K 2.7 (LL) 11/21/2024     11/21/2024    CO2 25 11/21/2024     Lab Results   Component Value Date    WBC 7.5 11/21/2024    HGB 10.3 (L) 11/21/2024    HCT 32.0 (L) 11/21/2024    MCV 90.7 11/21/2024     11/21/2024

## 2024-11-21 NOTE — OR NURSING
Image guided gallbladder drain insertion midanterior abdomen completed. Dr. Nieves placed 10 [place] 25 cm Argon SKATER LOT # [id number] EXP 6/21/28 in the abdomen. Drain/tube secured  100 milliliters of bile colored withdrawn immediately. Drain/tube dressing clean, dry, and intact. Pt tolerated procedure without any signs or symptoms of distress. Vital signs stable.   Drain #1 in, moving to right side for possible drain insertion #2.Patient remains sedated.      Vital Signs  Vitals:    11/21/24 1345   BP: 133/67   Pulse: 84   Resp: 16   Temp:    SpO2: 100%    (vital signs in table format)

## 2024-11-21 NOTE — PROGRESS NOTES
responded to request from Palliative Care.  confirmed with [facility] to provide [facility] for [facility] as requested by family. OLIVIA

## 2024-11-21 NOTE — CONSULTS
she is not showing signs of discomfort.  They would like the roth left in.  They are agreeable to comfort medications. Discussed morphine and ativan.     Spent time discussing what would be important to patient.  Spent time discussing needs of the family.  They would like last rites.  Patient attended [facility]    Patient's brother in law  with bile duct cancer.  His wife was present in the discussion and was able to offer insight.  She also had experience with hospice as did patient's daughter who happens to work in hospice.  Discussed philosophy and logistics of hospice.  Family had no choice in hospice agency.  They are agreeable to referral to HOC.     Total ACP time: 60 minutes    Disposition/Discharge Plan:   - code status change to DNR CC  - OH DNR signed and placed in paper chart  - hospice order placed  - hospice referral called and sent to HOC.  Meeting time TBD  -  consult for [facility]. Spoke with  and  from [facility] will be here this afternoon  - comfort meds ordered, other orders DCd  - family would like patient to DC home tomorrow after second drain is placed       Advance Directives:    Copy in chart    The patient has appointed the following active healthcare agents:    Primary Decision Maker: Taylor Pierre - Child - [phone]    Secondary Decision Maker (Active): CjColleen - Child - [phone]    The Patient has the following current code status:    Code Status: DNR-CC      Interactive exchange regarding medications,tests and procedures with: patient, family, floor RN, case management, hospitalist, oncology, , hospice    Thank you for allowing us to participate in the care of this patient.      HISTORY     CC: weakness  HPI: The patient is a 69 y.o. female with PMHx significant for colon cancer, colectomy, liver metastasis, biliary obstruction and jaundice who presents for weakness.  Patient reports she feels too weak to do any  activities which is reason for presentation.  She has cancer mets in the liver which have caused her to become jaundiced.  She states she has a common bile duct stent which is not working.  She denies any chest pain or shortness of breath.  No fevers, chills or sweats.  She follows with Dr. Goncalves of oncology.  She had a port placed for administration of palliative chemo.  The port was placed yesterday.           Palliative Performance Scale:     [] 60%  Amb reduced; Sig dz. Can't do hobbies/housework; Intake normal or reduced, Occasional assist; LOC full/confusion   [] 50%  Mainly sit/lie; Extensive disease. Mainly assist, Intake normal or reduced; Occasional assist; LOC full/confusion   [] 40%  Mainly in bed; Extensive disease; Mainly assist; Intake normal or reduced; Occasional assist; LOC full/confusion   [x] 30%  Bed bound, Extensive disease; Total care; Intake reduced; LOC full/confusion   [] 20%  Bed bound; Extensive disease; Total care; Intake minimal; Drowsy/coma   [] 10%  Bed bound; Extensive disease; Total care; Mouth care only; Drowsy/coma   []  0%   Death       Home med list and hospital medications reviewed in chart as of 11/21/2024     EXAM     Vitals:    11/21/24 1425   BP: 116/70   Pulse: 76   Resp: 20   Temp:    SpO2: 100%                OBJECTIVE   /70   Pulse 76   Temp 97.6 °F (36.4 °C) (Oral)   Resp 20   Ht 1.626 m (5' 4\")   Wt 84.8 kg (187 lb)   LMP  (LMP Unknown)   SpO2 100%   BMI 32.10 kg/m²   No intake/output data recorded.  I/O this shift:  In: 100 [IV Piggyback:100]  Out: -       Palliative Medicine Interventions:    patient/family support  Goals of Care discussions with patient/surrogate  Spiritual Interventions:consulted        DATA:  Current labs in the epic chart reviewed as of 11/21/2024   Review of previous notes, admits, labs, radiology and testing relevant to this consult done in this chart today 11/21/2024  Review of advance directives (if any) in chart    Medical

## 2024-11-21 NOTE — PRE SEDATION
Sedation Pre-Procedure Note    Patient Name: Georgi Ryan   YOB: 1955  Room/Bed: [facility]40324-01  Medical Record Number: [id number]  Date: 2024   Time: 2:44 PM       Indication:  Biliary Drain placement    Consent: I have discussed with the patient and/or the patient representative the indication, alternatives, and the possible risks and/or complications of the planned procedure and the anesthesia methods. The patient and/or patient representative appear to understand and agree to proceed.    Vital Signs:   Vitals:    24 1425   BP: 116/70   Pulse:    Resp:    Temp:    SpO2: 100%       Past Medical History:   has a past medical history of Anemia, Awareness under anesthesia, Cancer (HCC), History of scarlet fever, Liver lesion, PONV (postoperative nausea and vomiting), and Wears glasses.    Past Surgical History:   has a past surgical history that includes knee surgery;  section; Upper gastrointestinal endoscopy (N/A, 2021); Colonoscopy (N/A, 2021); Colonoscopy (2021); Small intestine surgery (Right, 2021); other surgical history (Right); CT NEEDLE BIOPSY LIVER PERCUTANEOUS (10/18/2024); ERCP (N/A, 2024); Upper gastrointestinal endoscopy (N/A, 2024); ERCP (2024); ERCP (2024); ERCP (2024); Endoscopy, colon, diagnostic; Colon surgery (Right, 2021); and Port Surgery (N/A, 2024).    Medications:   Scheduled Meds:    sodium chloride flush  5-40 mL IntraVENous 2 times per day    enoxaparin  40 mg SubCUTAneous Daily    potassium chloride  10 mEq IntraVENous Q1H    pantoprazole  40 mg IntraVENous Daily     Continuous Infusions:    sodium chloride 1,000 mL (24 1008)    sodium chloride      sodium chloride       PRN Meds: fentanNYL, sodium chloride flush, sodium chloride, ondansetron **OR** ondansetron, polyethylene glycol  Home Meds:   Prior to Admission medications    Medication Sig Start Date End Date Taking?

## 2024-11-21 NOTE — OR NURSING
Image guided gallbladder drain insertion mid abdomen completed. Dr. Nieves placed  Drain/tube dressing clean, dry, and intact. Pt tolerated procedure without any signs or symptoms of distress. Vital signs stable.   See prior note for tube lot.Report called to patient RN. Pt transported back to 324 in stable condition via bed by transport.     Medications  Versed: 2 mg  Fentanyl: 100 mcg    Vital Signs   (vital signs in table format)  Vitals:    11/21/24 1355   BP: (!) 142/64   Pulse: 87   Resp: 18   Temp:    SpO2: 100%     Post Katia  2 - Able to move 4 extremities voluntarily on command  2 - BP+/- 20mmHg of normal  1 - Arousable on calling  1 - Needs oxygen to maintain oxygen saturation >90%  2 - Able to breathe deeply and cough freely

## 2024-11-21 NOTE — H&P
Hospital Medicine History & Physical      Date of Admission: 11/21/2024    Date of Service:  Pt seen/examined on 9/21/2024    [x]Admitted to Inpatient with expected LOS greater than two midnights due to medical therapy.  []Placed in Observation status.    Chief Admission Complaint: Generalized weakness and yellow discoloration    Presenting Admission History:      69 y.o. female who presented to [facility] with above complaint.  PMHx significant for colon cancer, colectomy, liver metastasis, biliary obstruction and jaundice came to the emergency room with the above complaints.    She looks very tired  No chest pain abdominal pain vomiting diarrhea or urinary complaints  Her urine is dark  Has very poor appetite  No focal neurological symptoms   Fluctuating mental status according to family  No fever or seizures    Assessment/Plan:      Current Principal Problem:  Jaundice    History of colon cancer in 2020 s/p surgery, currently with metastasis to liver/bile duct-diagnosed in October 2024  Oncology has been following outpatient  Inpatient consult was called  No chemo started  Planning on percutaneous drain by IR or surgery    Obstructive jaundice-with elevated transaminase and total bilirubin-secondary to above  Failed  stent that was placed recently  Overall prognosis is not that great  Family is aware    Hypokalemia-replace    Elevated BUN-clinically dehydrated IV fluids    Anemia-hemoglobin is little higher compared to before possibly hemoconcentration    Hypercalcemia-secondary to dehydration as well as cancer-IV fluids          Discussed management and the need for Hospitalization of the patient w/ the Emergency Department Provider: Dr. Junior    CXR: I have reviewed the CXR with the following interpretation:   EKG:  I have reviewed the EKG with the following interpretation: 1    Physical Exam Performed:      BP (!) 120/45   Pulse 80   Temp 97.6 °F (36.4 °C) (Oral)   Resp 15   Ht 1.626 m (5' 4\")

## 2024-11-21 NOTE — CARE COORDINATION
Per chart review patient was readmitted to A. Will continue to monitor.     Cydney Gaitan, MSN, RN, [facility]  Care Transition Nurse  [phone] mobile

## 2024-11-21 NOTE — ED NOTES
Georgi Ryan is a 69 y.o. female admitted for  Active Problems:    * No active hospital problems. *  Resolved Problems:    * No resolved hospital problems. *  .   Patient Home via EMS transportation with   Chief Complaint   Patient presents with    Fatigue     Pt diagnosed with liver cancer at the end of September. Pt had port placed yesterday. Pt states she has been jaundiced three weeks. Pt c/o fatigue. Supposed to get first dose of chemo today, but MD sent for further eval.    Jaundice   .  Patient is alert and Person, Place, Time, and Situation  Patient's baseline mobility: Baseline Mobility: Walker  Code Status: DNR-CCA   Cardiac Rhythm:       Is patient on baseline Oxygen: no how many Liters:   Abnormal Assessment Findings: Stage IV liver cancer; lung mets; jaundiced    Isolation: None      NIH Score:    C-SSRS: Risk of Suicide: No Risk  Bedside swallow:        Active LDA's:   Peripheral IV 11/21/24 Left Antecubital (Active)   Site Assessment Clean, dry & intact 11/21/24 0839   Line Status Brisk blood return;Flushed;Normal saline locked;Specimen collected 11/21/24 0839   Phlebitis Assessment No symptoms 11/21/24 0839   Infiltration Assessment 0 11/21/24 0839   Dressing Status Clean, dry & intact 11/21/24 0839   Dressing Type Transparent 11/21/24 0839   Dressing Intervention New 11/21/24 0839     Patient admitted with a roth: no If the roth is chronic was it exchanged:NA  Reason for roth:   Patient admitted with Central Line:  Port  - NOT accessed at this time. PICC line placement confirmed:   Reason for Central line:   Was central line Inserted from an outside facility:        Family/Caregiver Present yes Any Concerns: no   Restraints no  Sitter no         Vitals: MEWS Score: 1    Vitals:    11/21/24 0945 11/21/24 1015 11/21/24 1055 11/21/24 1125   BP: (!) 102/49 (!) 121/55 (!) 114/51 (!) 120/45   Pulse: 86 87 81 80   Resp: 15 17 15 15   Temp:       TempSrc:       SpO2: 94% 97% 95% 97%   Weight:        Height:           Last documented pain score (0-10 scale) Pain Level: 0  Pain medication administered Yes- see MAR.    Pertinent or High Risk Medications/Drips: No.    Pending Blood Product Administration: no    Abnormal labs:   Abnormal Labs Reviewed   CBC WITH AUTO DIFFERENTIAL - Abnormal; Notable for the following components:       Result Value    RBC 3.54 (*)     Hemoglobin 10.3 (*)     Hematocrit 32.0 (*)     RDW 17.1 (*)     Lymphocytes Absolute 0.8 (*)     Metamyelocytes Relative 2 (*)     Toxic Granulation Present (*)     Dohle Bodies Present (*)     Vacuolated Neutrophils Present (*)     Anisocytosis 1+ (*)     Microcytes Occasional (*)     Polychromasia Occasional (*)     Poikilocytes 1+ (*)     Ovalocytes Occasional (*)     Spherocytes Occasional (*)     Stomatocytes 1+ (*)     Target Cells Occasional (*)     All other components within normal limits   COMPREHENSIVE METABOLIC PANEL - Abnormal; Notable for the following components:    Potassium 2.7 (*)     BUN 33 (*)     Est, Glom Filt Rate 55 (*)     Calcium 14.0 (*)     Total Protein 5.1 (*)     Albumin 2.2 (*)     Albumin/Globulin Ratio 0.8 (*)     Total Bilirubin 19.8 (*)     Alkaline Phosphatase 1,481 (*)      (*)      (*)     All other components within normal limits    Narrative:     CALL  [facility] tel. [phone],  Chemistry results called to and read back by Rachel Nagel RN, 11/21/2024  09:34, by QUINCY   MAGNESIUM - Abnormal; Notable for the following components:    Magnesium 2.47 (*)     All other components within normal limits    Narrative:     CALL  [facility] tel. [phone],  Chemistry results called to and read back by Rachel Nagel RN, 11/21/2024  09:34, by QUINCY   URINALYSIS WITH REFLEX TO CULTURE - Abnormal; Notable for the following components:    Color, UA DARK YELLOW (*)     Glucose, Ur 100 (*)     Bilirubin, Urine LARGE (*)     All other components within normal limits   BILIRUBIN TOTAL DIRECT & INDIRECT -

## 2024-11-21 NOTE — BRIEF OP NOTE
Brief Postoperative Note      Patient: Georgi Ryan  YOB: 1955  MRN: [id number]    Date of Procedure: 11/21/2024    Cholangiogram and Percutaneous Biliary Tube Placement    Surgeon(s):  Moo Nieves MD    Anesthesia: Moderate Sedation    Estimated Blood Loss (mL): Minimal    Complications: None    Findings:  Right and left biliary ducts appear to communicate on cholangiogram.  Common bile duct was unable to be cannulated.  Left sided 10 Sami external biliary drain placed.   Final contrast injection did not reveal communication with the right sided ducts  Continue to monitor LFTs.  She may require right sided tube in the future.  Discussed with family and Dr. Goncalves.    Electronically signed by Moo Nieves MD on 11/21/2024 at 2:44 PM

## 2024-11-21 NOTE — PROGRESS NOTES
[facility]    Met with family, daughters Pranav and Colleen, ASYA Solo, brother Maura, sister-in-law Radha, and nephew Arnold to discuss HOC philosophy, levels of care and services. Time spent listening to events of illness, answering all questions and providing emotional support. Family states patient was working full time 1 month ago, driving 5 days ago and this is significant decline. They would like to take her home with HOC services when hospital care has been optimized for success at home. Per family, plan is possible placement of additional biliary drain right side. Family requests discussion with MD regarding possible PleurX drain for ascites if appropriate prior to discharge. Left VM for Palliative Care regarding. Left VM for CM. Thank you for the opportunity to serve this patient. HOC RN to F/U tomorrow.     Sherry Jesus RN  [phone] Office  [phone] Mobile

## 2024-11-21 NOTE — CONSULTS
[facility]    Consult Note      Requesting Physician:  Antelmo Junior     CHIEF COMPLAINT:  continuity       HISTORY OF PRESENT ILLNESS:    Georgi Ryan is a 69 year old female with PMHX of colon cancer, recent diagnosis of cancer of unknown primary involving the liver followed by [facility] who presented to [facility] ED on 2024 with concerns for AMS and weakness.     Hem/onc consulted for continuity. Family at bedside. Report that the patient has not been eating/drinking much for several days and very weak for about a week. Mental status changed overnight and she became minimally responsive. Patient reports mid back pain.     Ms. Ryan  is a 69 y.o. female we are seeing in consultation for No diagnosis found.       Past Medical History:  Past Medical History:   Diagnosis Date    Anemia     Awareness under anesthesia     Cancer (HCC)     colon, gallbladder carcinoma    History of scarlet fever     Liver lesion 10/31/2024    PONV (postoperative nausea and vomiting)     Wears glasses        Past Surgical History:  Past Surgical History:   Procedure Laterality Date     SECTION      COLON SURGERY Right 2021    COLONOSCOPY N/A 2021    COLONOSCOPY WITH BIOPSY performed by Vance Green MD at [facility] ENDOSCOPY    COLONOSCOPY  2021    COLONOSCOPY SUBMUCOSAL TATTOO INJECTION performed by Vance Green MD at [facility] ENDOSCOPY    CT NEEDLE BIOPSY LIVER PERCUTANEOUS  10/18/2024    CT NEEDLE BIOPSY LIVER PERCUTANEOUS 10/18/2024 [facility] CT SCAN    ENDOSCOPY, COLON, DIAGNOSTIC      EGD    ERCP N/A 2024    ENDOSCOPIC RETROGRADE CHOLANGIOPANCREATOGRAPHY performed by Santi Betancourt MD at [facility] ENDOSCOPY    ERCP  2024    ENDOSCOPIC RETROGRADE CHOLANGIOPANCREATOGRAPHY SPHINCTER/PAPILLOTOMY performed by Santi Betancourt MD at [facility] ENDOSCOPY     ERCP  11/01/2024    ENDOSCOPIC RETROGRADE CHOLANGIOPANCREATOGRAPHY BILIARY BRUSHING performed by Santi Betancourt MD at [facility] ENDOSCOPY    ERCP  11/01/2024    ENDOSCOPIC RETROGRADE CHOLANGIOPANCREATOGRAPHY STENT INSERTION performed by Santi Betancourt MD at [facility] ENDOSCOPY    KNEE SURGERY      OTHER SURGICAL HISTORY Right     liver biopsy    PORT SURGERY N/A 11/20/2024    SURGICAL PORT PLACEMENT performed by Yovani Cooney MD at [facility] OR    SMALL INTESTINE SURGERY Right 02/04/2021    OPEN RIGHT COLECTOMY performed by Yovani Coonye MD at [facility] OR    UPPER GASTROINTESTINAL ENDOSCOPY N/A 01/27/2021    EGD BIOPSY performed by Vance Green MD at [facility] ENDOSCOPY    UPPER GASTROINTESTINAL ENDOSCOPY N/A 11/01/2024    ESOPHAGOGASTRODUODENOSCOPY BIOPSY performed by Santi Betancourt MD at [facility] ENDOSCOPY       Current Medications:  Current Facility-Administered Medications   Medication Dose Route Frequency Provider Last Rate Last Admin    fentaNYL (SUBLIMAZE) injection 100 mcg  100 mcg IntraVENous Q1H PRN Antelmo Junior MD   100 mcg at 11/21/24 0916     Current Outpatient Medications   Medication Sig Dispense Refill    acetaminophen-codeine (TYLENOL #3) 300-30 MG per tablet Take 1 tablet by mouth every 4 hours as needed for Pain for up to 3 days. Max Daily Amount: 6 tablets 12 tablet 0    furosemide (LASIX) 20 MG tablet Take 1 tablet by mouth daily      ondansetron (ZOFRAN-ODT) 4 MG disintegrating tablet Take 1 tablet by mouth every 8 hours as needed for Nausea or Vomiting 30 tablet 0    Handicap Placard MISC by Does not apply route Exp: 11/1/2028 1 each 0       Allergies:  Allergies   Allergen Reactions    Novocain [Procaine] Rash       Social History:  Social History     Socioeconomic History    Marital status:      Spouse name: Not on file    Number of children: 2    Years of education: Not on file    Highest education level: Not on file   Occupational History    Not on file

## 2024-11-21 NOTE — CARE COORDINATION
Writer spoke with [facility] she reports plan for hospice eval. Referral was made to [facility]. Sherry RN with [facility] in house at this time. Will follow.DONNY Jones

## 2024-11-21 NOTE — ED NOTES
@2511 Called GI Per   RE:liver failure - [place]  @2986 Mulu abrams from gi called back and spoke to

## 2024-11-21 NOTE — CONSULTS
Added to treatment team.   Tried to call, no answer at 1447    Who: [facility] Bajwa  Date: 11/21  Time: 1448     Electronically signed by Angi De Dios on 11/21/2024 at 2:48 PM

## 2024-11-22 VITALS
TEMPERATURE: 97.7 F | BODY MASS INDEX: 31.92 KG/M2 | DIASTOLIC BLOOD PRESSURE: 74 MMHG | SYSTOLIC BLOOD PRESSURE: 126 MMHG | RESPIRATION RATE: 16 BRPM | OXYGEN SATURATION: 100 % | HEIGHT: 64 IN | HEART RATE: 91 BPM | WEIGHT: 187 LBS

## 2024-11-22 LAB
ALBUMIN SERPL-MCNC: 1.9 G/DL (ref 3.4–5)
ALP SERPL-CCNC: 1173 U/L (ref 40–129)
ALT SERPL-CCNC: 107 U/L (ref 10–40)
ANION GAP SERPL CALCULATED.3IONS-SCNC: 11 MMOL/L (ref 3–16)
ANISOCYTOSIS BLD QL SMEAR: ABNORMAL
AST SERPL-CCNC: 111 U/L (ref 15–37)
BASOPHILS # BLD: 0 K/UL (ref 0–0.2)
BASOPHILS NFR BLD: 0 %
BILIRUB DIRECT SERPL-MCNC: 11.1 MG/DL (ref 0–0.3)
BILIRUB INDIRECT SERPL-MCNC: 3.8 MG/DL (ref 0–1)
BILIRUB SERPL-MCNC: 14.9 MG/DL (ref 0–1)
BUN SERPL-MCNC: 41 MG/DL (ref 7–20)
CALCIUM SERPL-MCNC: 13.9 MG/DL (ref 8.3–10.6)
CHLORIDE SERPL-SCNC: 105 MMOL/L (ref 99–110)
CO2 SERPL-SCNC: 23 MMOL/L (ref 21–32)
CREAT SERPL-MCNC: 1.4 MG/DL (ref 0.6–1.2)
DEPRECATED RDW RBC AUTO: 16.8 % (ref 12.4–15.4)
DOHLE BOD BLD QL SMEAR: PRESENT
EOSINOPHIL # BLD: 0.6 K/UL (ref 0–0.6)
EOSINOPHIL NFR BLD: 5 %
GFR SERPLBLD CREATININE-BSD FMLA CKD-EPI: 41 ML/MIN/{1.73_M2}
GLUCOSE SERPL-MCNC: 72 MG/DL (ref 70–99)
HCT VFR BLD AUTO: 29.8 % (ref 36–48)
HGB BLD-MCNC: 9.7 G/DL (ref 12–16)
HYPOCHROMIA BLD QL SMEAR: ABNORMAL
LYMPHOCYTES # BLD: 1 K/UL (ref 1–5.1)
LYMPHOCYTES NFR BLD: 8 %
MACROCYTES BLD QL SMEAR: ABNORMAL
MAGNESIUM SERPL-MCNC: 2.49 MG/DL (ref 1.8–2.4)
MCH RBC QN AUTO: 29.4 PG (ref 26–34)
MCHC RBC AUTO-ENTMCNC: 32.6 G/DL (ref 31–36)
MCV RBC AUTO: 90.4 FL (ref 80–100)
METAMYELOCYTES NFR BLD MANUAL: 1 %
MICROCYTES BLD QL SMEAR: ABNORMAL
MONOCYTES # BLD: 0.6 K/UL (ref 0–1.3)
MONOCYTES NFR BLD: 5 %
MYELOCYTES NFR BLD MANUAL: 3 %
NEUTROPHILS # BLD: 10.3 K/UL (ref 1.7–7.7)
NEUTROPHILS NFR BLD: 58 %
NEUTS BAND NFR BLD MANUAL: 20 % (ref 0–7)
OVALOCYTES BLD QL SMEAR: ABNORMAL
PLATELET # BLD AUTO: 207 K/UL (ref 135–450)
PLATELET BLD QL SMEAR: ADEQUATE
PMV BLD AUTO: 9.2 FL (ref 5–10.5)
POIKILOCYTOSIS BLD QL SMEAR: ABNORMAL
POLYCHROMASIA BLD QL SMEAR: ABNORMAL
POTASSIUM SERPL-SCNC: 3.2 MMOL/L (ref 3.5–5.1)
PROT SERPL-MCNC: 4.7 G/DL (ref 6.4–8.2)
RBC # BLD AUTO: 3.3 M/UL (ref 4–5.2)
SLIDE REVIEW: ABNORMAL
SODIUM SERPL-SCNC: 139 MMOL/L (ref 136–145)
STOMATOCYTES BLD QL SMEAR: ABNORMAL
TARGETS BLD QL SMEAR: ABNORMAL
TOXIC GRANULES BLD QL SMEAR: PRESENT
WBC # BLD AUTO: 12.6 K/UL (ref 4–11)

## 2024-11-22 PROCEDURE — 85025 COMPLETE CBC W/AUTO DIFF WBC: CPT

## 2024-11-22 PROCEDURE — 36415 COLL VENOUS BLD VENIPUNCTURE: CPT

## 2024-11-22 PROCEDURE — 83735 ASSAY OF MAGNESIUM: CPT

## 2024-11-22 PROCEDURE — 6370000000 HC RX 637 (ALT 250 FOR IP): Performed by: INTERNAL MEDICINE

## 2024-11-22 PROCEDURE — 80076 HEPATIC FUNCTION PANEL: CPT

## 2024-11-22 PROCEDURE — 6360000002 HC RX W HCPCS: Performed by: INTERNAL MEDICINE

## 2024-11-22 PROCEDURE — 2580000003 HC RX 258: Performed by: INTERNAL MEDICINE

## 2024-11-22 PROCEDURE — 6360000002 HC RX W HCPCS: Performed by: NURSE PRACTITIONER

## 2024-11-22 PROCEDURE — 80048 BASIC METABOLIC PNL TOTAL CA: CPT

## 2024-11-22 PROCEDURE — 51702 INSERT TEMP BLADDER CATH: CPT

## 2024-11-22 RX ORDER — ONDANSETRON 4 MG/1
4 TABLET, ORALLY DISINTEGRATING ORAL EVERY 8 HOURS PRN
Qty: 30 TABLET | Refills: 0 | Status: SHIPPED | OUTPATIENT
Start: 2024-11-22

## 2024-11-22 RX ORDER — LORAZEPAM 2 MG/ML
0.5 CONCENTRATE ORAL EVERY 4 HOURS PRN
Qty: 30 ML | Refills: 0 | Status: SHIPPED | OUTPATIENT
Start: 2024-11-22 | End: 2024-12-06

## 2024-11-22 RX ORDER — POTASSIUM CHLORIDE 1500 MG/1
40 TABLET, EXTENDED RELEASE ORAL ONCE
Status: COMPLETED | OUTPATIENT
Start: 2024-11-22 | End: 2024-11-22

## 2024-11-22 RX ORDER — SODIUM CHLORIDE 9 MG/ML
1000 INJECTION, SOLUTION INTRAVENOUS CONTINUOUS
Status: DISCONTINUED | OUTPATIENT
Start: 2024-11-22 | End: 2024-11-22 | Stop reason: HOSPADM

## 2024-11-22 RX ORDER — MORPHINE SULFATE 100 MG/5ML
5 SOLUTION ORAL EVERY 4 HOURS PRN
Qty: 30 ML | Refills: 0 | Status: SHIPPED | OUTPATIENT
Start: 2024-11-22 | End: 2024-11-25

## 2024-11-22 RX ADMIN — ENOXAPARIN SODIUM 40 MG: 100 INJECTION SUBCUTANEOUS at 08:37

## 2024-11-22 RX ADMIN — SODIUM CHLORIDE 1000 ML: 9 INJECTION, SOLUTION INTRAVENOUS at 08:32

## 2024-11-22 RX ADMIN — MORPHINE SULFATE 2 MG: 2 INJECTION, SOLUTION INTRAMUSCULAR; INTRAVENOUS at 05:39

## 2024-11-22 RX ADMIN — POTASSIUM CHLORIDE 40 MEQ: 1500 TABLET, EXTENDED RELEASE ORAL at 08:37

## 2024-11-22 RX ADMIN — PANTOPRAZOLE SODIUM 40 MG: 40 INJECTION, POWDER, FOR SOLUTION INTRAVENOUS at 08:33

## 2024-11-22 RX ADMIN — SODIUM CHLORIDE, PRESERVATIVE FREE 10 ML: 5 INJECTION INTRAVENOUS at 08:33

## 2024-11-22 ASSESSMENT — PAIN DESCRIPTION - LOCATION: LOCATION: ABDOMEN

## 2024-11-22 ASSESSMENT — PAIN DESCRIPTION - DESCRIPTORS: DESCRIPTORS: ACHING

## 2024-11-22 ASSESSMENT — PAIN SCALES - GENERAL
PAINLEVEL_OUTOF10: 8
PAINLEVEL_OUTOF10: 0

## 2024-11-22 NOTE — FLOWSHEET NOTE
11/22/24 0745   Vital Signs   Temp 97.7 °F (36.5 °C)   Temp Source Oral   Pulse 91   Heart Rate Source Monitor   Respirations 16   /74   MAP (Calculated) 91   MAP (mmHg) 92   Pain Assessment   Pain Assessment 0-10   Pain Level 0   Opioid-Induced Sedation   POSS Score 1   Oxygen Therapy   SpO2 100 %   O2 Device Nasal cannula   O2 Flow Rate (L/min) 2 L/min   Rhythm Interpretation   Cardiac Rhythm   (Not on tele)       Lab Results   Component Value Date/Time    WBC 12.6 (H) 11/22/2024 04:27 AM    HGB 9.7 (L) 11/22/2024 04:27 AM    HCT 29.8 (L) 11/22/2024 04:27 AM     11/22/2024 04:27 AM     11/22/2024 04:27 AM    K 3.2 (L) 11/22/2024 04:27 AM    BUN 41 (H) 11/22/2024 04:27 AM    CREATININE 1.4 (H) 11/22/2024 04:27 AM    MG 2.49 (H) 11/22/2024 04:27 AM        AM Assessment completed.  Patient in bed.  Eyes closed. Respirations easy unlabored.    Pain/Discomfort is being managed with PRN analgesics per MD orders (See MAR). Patient is able to express and rate pain using numerical scale.  Plan of care, education and safety measures reviewed with the patient.   Calls appropriately. Needed items including call light in reach and exit alarm in place.

## 2024-11-22 NOTE — DISCHARGE SUMMARY
Hospital Medicine Discharge Summary    Patient: Georgi Ryan   : 1955     Admit Date: 2024   Discharge Date:     Disposition:  []Home   []HHC  []SNF  []Acute Rehab  []LTAC  []Hospice  Code status:  []Full  []DNR/CCA  []Limited (DNR/CCA with Do Not Intubate)  []DNRCC  Condition at Discharge: Stable  Primary Care Provider: Luisana Gunter PA    Admitting Provider: Katherin Carlin MD  Discharge Provider: Katherin Carlin MD     Discharge Diagnoses:      Active Hospital Problems    Diagnosis     Jaundice [R17]        Presenting Admission History:      ***     Assessment/Plan:      ***  Heme/Onc consulted and appreciated.  Available consultant notes from yesterday and today were reviewed on 2024, w/ plan for continued inpatient w/up and management -  GI consulted and appreciated.  Available consultant notes from yesterday and today were reviewed on 2024, w/ plan for continued inpatient w/up and management -  signed off   IR input reviewed ,    Left sided 10 Sinhala external biliary drain placed.   Final contrast injection did not reveal communication with the right sided ducts  Continue to monitor LFTs.  She may require right sided tube in the future    Physical Exam Performed:      /74   Pulse 91   Temp 97.7 °F (36.5 °C) (Oral)   Resp 16   Ht 1.626 m (5' 4\")   Wt 84.8 kg (187 lb)   LMP  (LMP Unknown)   SpO2 100%   BMI 32.10 kg/m²       General appearance:  No apparent distress, appears stated age and cooperative.  Respiratory:  Normal respiratory effort.   Cardiovascular:  Regular rate and rhythm.  Abdomen:  Soft, non-tender, non-distended.  Musculoskeletal:  No edema  Neurologic:  Non-focal  Psychiatric:  Alert and oriented    Patient Discharge Instructions:      Follow up:    1.  Primary Care Provider Luisana Gunter PA in the next 1-2 weeks.      The patient was seen and examined on day of discharge and this discharge summary is in conjunction with any daily progress  Labs     11/21/24  0835 11/21/24  0839   INR  --  1.14   LACTA 1.8  --        Urine Cultures:   Lab Results   Component Value Date/Time    LABURIN  10/14/2024 07:36 PM     <10,000 CFU/ml mixed skin/urogenital tien. No further workup     Blood Cultures:   Lab Results   Component Value Date/Time    BC No Growth after 4 days of incubation. 10/31/2024 05:07 PM     Lab Results   Component Value Date/Time    BLOODCULT2 No Growth after 4 days of incubation. 10/31/2024 05:07 PM     Organism: No results found for: \"ORG\"    Signed:    Katherin Carlin MD

## 2024-11-22 NOTE — PROGRESS NOTES
Nursing Discharge Note    Verbal and Written discharge instructions was given to the patient and her family including diet, activity  and medications. The following medications was picked up and delivered to the family by the hospice RN.  LORazepam (ATIVAN)   morphine sulfate      Patient is being discharged home with biliary tube and roth catheter. No IV access. D/C'd IV patient tolerated well, no signs of bleeding.The written instructions was given to the patient to take home. Additional supplies provided to family to support the patient at home.  Pt discharged home with all belongings. Out via St[facility]cher.

## 2024-11-22 NOTE — PROGRESS NOTES
Hospice [facility]    Call to Kary/DREW Vazquez to review HOC philosophy and services. All questions answered. Plan is discharge home today with HOC services. DME is being delivered prior to 1300. Transport scheduled with [facility], 1400 . ANGELI Powell and Susan GALLEGO updated. Thank you for the opportunity to serve this patient.    Sherry Jesus RN  [phone] [place]  [phone] Office

## 2024-11-22 NOTE — DISCHARGE INSTR - COC
Continuity of Care Form    Patient Name: Georgi Ryan   :  1955  MRN:  [id number]    Admit date:  2024  Discharge date:  2024    Code Status Order: DNR-CC   Advance Directives:   Advance Care Flowsheet Documentation             Admitting Physician:  Katherin Carlin MD  PCP: Luisana Gunter PA    Discharging Nurse: [facility] Unit/Room[facility] 0324/0324-01  Discharging Unit Phone Number: 3 C    Emergency Contact:   Extended Emergency Contact Information  Primary Emergency Contact: Colleen Corona  Home Phone: [phone]  Relation: Child  Secondary Emergency Contact: Taylor Pierre  Home Phone: [phone]  Mobile Phone: [phone]  Relation: Child    Past Surgical History:  Past Surgical History:   Procedure Laterality Date     SECTION      COLON SURGERY Right 2021    COLONOSCOPY N/A 2021    COLONOSCOPY WITH BIOPSY performed by Vance Green MD at [facility] ENDOSCOPY    COLONOSCOPY  2021    COLONOSCOPY SUBMUCOSAL TATTOO INJECTION performed by Vance Green MD at [facility] ENDOSCOPY    CT NEEDLE BIOPSY LIVER PERCUTANEOUS  10/18/2024    CT NEEDLE BIOPSY LIVER PERCUTANEOUS 10/18/2024 [facility] CT SCAN    ENDOSCOPY, COLON, DIAGNOSTIC      EGD    ERCP N/A 2024    ENDOSCOPIC RETROGRADE CHOLANGIOPANCREATOGRAPHY performed by Santi Betancourt MD at [facility] ENDOSCOPY    ERCP  2024    ENDOSCOPIC RETROGRADE CHOLANGIOPANCREATOGRAPHY SPHINCTER/PAPILLOTOMY performed by Santi Betancourt MD at [facility] ENDOSCOPY    ERCP  2024    ENDOSCOPIC RETROGRADE CHOLANGIOPANCREATOGRAPHY BILIARY BRUSHING performed by Santi Betancourt MD at [facility] ENDOSCOPY    ERCP  2024    ENDOSCOPIC RETROGRADE CHOLANGIOPANCREATOGRAPHY STENT INSERTION performed by Santi Betancourt MD at [facility] ENDOSCOPY    IR BILIARY DRAIN EXTERNAL  2024    IR BILIARY DRAIN EXTERNAL 2024 [facility] SPECIAL PROCEDURES    KNEE SURGERY      OTHER SURGICAL HISTORY Right   information only, NOT a DME order):  hospital bed  Other Treatments: Hospice Services    Patient's personal belongings (please select all that are sent with patient):  Family looking after personal belongings.    RN SIGNATURE:  Electronically signed by Susan Swann RN on 11/22/24 at 12:04 PM EST    CASE MANAGEMENT/SOCIAL WORK SECTION    Inpatient Status Date: 11/21/2024    Readmission Risk Assessment Score:  Readmission Risk              Risk of Unplanned Readmission:  20           Discharging to Facility/ Agency   [facility]  Services: Home Hospice  [address] [address]  [phone]   / signature: Electronically signed by DONNY Jones on 11/22/24 at 11:02 AM EST    PHYSICIAN SECTION    Prognosis: {Prognosis:9112532313}    Condition at Discharge: { Patient Condition:233483919}    Rehab Potential (if transferring to Rehab): {Prognosis:2677187930}    Recommended Labs or Other Treatments After Discharge: ***    Physician Certification: I certify the above information and transfer of Georgi Ryan  is necessary for the continuing treatment of the diagnosis listed and that she requires {Admit to Appropriate Level of Care:37615} for {GREATER/LESS:605225893} 30 days.     Update Admission H&P: {CHP DME Changes in HandP:641645056}    PHYSICIAN SIGNATURE:  {Esignature:866107180}

## 2024-11-22 NOTE — PROGRESS NOTES
Latest Reference Range & Units 11/22/24 04:27   Calcium 8.3 - 10.6 mg/dL 13.9 ()   (): Data is critically high     Informed MD

## 2024-11-22 NOTE — PROGRESS NOTES
Perfect serve message to Dr Carlin  [facility] is here on the floor. She has transportation set to pick patient up at 2pm to go home. She is requesting for you to send a couple scripts to our outpatient pharmacy for patient to take home with her. 1) Morphine 20mg/ml give 5mg sublinqual Q4hr pain (dispense 30ml bottle). 2) Ativan intensol 2mg/ml give 0.5mg q4h prn restlessness (dispense 30ml bottle) and 3) zofran 4mg sl q6 prn nausea. If you have questions call Sherry Jesus with [phone]

## 2024-11-22 NOTE — CONSULTS
01/27/2021    COLONOSCOPY WITH BIOPSY performed by Vance Green MD at [facility] ENDOSCOPY    COLONOSCOPY  01/27/2021    COLONOSCOPY SUBMUCOSAL TATTOO INJECTION performed by Vance Green MD at [facility] ENDOSCOPY    CT NEEDLE BIOPSY LIVER PERCUTANEOUS  10/18/2024    CT NEEDLE BIOPSY LIVER PERCUTANEOUS 10/18/2024 [facility] CT SCAN    ENDOSCOPY, COLON, DIAGNOSTIC      EGD    ERCP N/A 11/01/2024    ENDOSCOPIC RETROGRADE CHOLANGIOPANCREATOGRAPHY performed by Snati Betancourt MD at [facility] ENDOSCOPY    ERCP  11/01/2024    ENDOSCOPIC RETROGRADE CHOLANGIOPANCREATOGRAPHY SPHINCTER/PAPILLOTOMY performed by Santi Betancourt MD at [facility] ENDOSCOPY    ERCP  11/01/2024    ENDOSCOPIC RETROGRADE CHOLANGIOPANCREATOGRAPHY BILIARY BRUSHING performed by Santi Betancourt MD at [facility] ENDOSCOPY    ERCP  11/01/2024    ENDOSCOPIC RETROGRADE CHOLANGIOPANCREATOGRAPHY STENT INSERTION performed by Santi Betancourt MD at [facility] ENDOSCOPY    IR BILIARY DRAIN EXTERNAL  11/21/2024    IR BILIARY DRAIN EXTERNAL 11/21/2024 [facility] SPECIAL PROCEDURES    KNEE SURGERY      OTHER SURGICAL HISTORY Right     liver biopsy    PORT SURGERY N/A 11/20/2024    SURGICAL PORT PLACEMENT performed by Yovani Cooney MD at [facility] OR    SMALL INTESTINE SURGERY Right 02/04/2021    OPEN RIGHT COLECTOMY performed by Yovani Cooney MD at [facility] OR    UPPER GASTROINTESTINAL ENDOSCOPY N/A 01/27/2021    EGD BIOPSY performed by Vance Green MD at [facility] ENDOSCOPY    UPPER GASTROINTESTINAL ENDOSCOPY N/A 11/01/2024    ESOPHAGOGASTRODUODENOSCOPY BIOPSY performed by Santi Betancourt MD at [facility] ENDOSCOPY     Allergies   Allergen Reactions    Novocain [Procaine] Rash      Prior to Admission medications    Medication Sig Start Date End Date Taking? Authorizing Provider   acetaminophen-codeine (TYLENOL #3) 300-30 MG per tablet Take 1 tablet by mouth every 4 hours as needed for Pain for up to 3 days. Max Daily Amount: 6 tablets 11/20/24 11/23/24   Cooney, Yovani Alexandre MD   furosemide (LASIX) 20 MG tablet Take 1 tablet by mouth daily 24   Provider, MD Cally   ondansetron (ZOFRAN-ODT) 4 MG disintegrating tablet Take 1 tablet by mouth every 8 hours as needed for Nausea or Vomiting 24   Faina Koch, APRN - CNP   Handicap Placard MISC by Does not apply route Exp: 2028   Luisana Gunter PA      Scheduled Meds:   sodium chloride flush  5-40 mL IntraVENous 2 times per day    enoxaparin  40 mg SubCUTAneous Daily    pantoprazole  40 mg IntraVENous Daily     Continuous Infusions:   sodium chloride 1,000 mL (24 1008)    sodium chloride      sodium chloride 75 mL/hr at 24 1526     PRN Meds:sodium chloride flush, sodium chloride, ondansetron **OR** ondansetron, morphine **OR** morphine, LORazepam  Family History   Problem Relation Age of Onset    High Blood Pressure Mother     Diabetes Father     Cancer Brother      Social History     Tobacco Use    Smoking status: Former     Current packs/day: 0.00     Average packs/day: 2.0 packs/day for 39.3 years (78.5 ttl pk-yrs)     Types: Cigarettes     Start date: 1972     Quit date: 2012     Years since quittin.8    Smokeless tobacco: Never   Substance Use Topics    Alcohol use: Not Currently       Objective:    Physical Exam:  /74   Pulse 92   Temp 97.7 °F (36.5 °C) (Oral)   Resp 16   Ht 1.626 m (5' 4\")   Wt 84.8 kg (187 lb)   LMP  (LMP Unknown)   SpO2 100%   BMI 32.10 kg/m²    General:  comfortable  Heent: There is scleral icterus.   Cardiovascular: The heart is regular rate and rhythm.  Respiratory:  The patient's breathing is non-labored with normal chest wall excursion and normal muscle movement.    Abdomen:  The abdomen is distended, soft, and nontender.   Rectal:  deferred   Extremities:  No edema.  Neurological:  Gross memory appears intact.  Patient is alert and oriented.    Labs and Imaging reviewed.  See HPI  Recent Labs     24  [id number]

## 2024-11-22 NOTE — ONCOLOGY
ONCOLOGY HEMATOLOGY CARE PROGRESS NOTE      SUBJECTIVE:    Afebrile.    Appears comfortable.  Did not awaken to voice.    No family present.    ROS:     Constitutional:  No weight loss, No fever, No chills, No night sweats.  Energy level good.  Eyes:  No impairment or change in vision  ENT / Mouth:  No pain, abnormal ulceration, bleeding, nasal drip or change in voice or hearing  Cardiovascular:  No chest pain, palpitations, new edema, or calf discomfort  Respiratory:  No pain, hemoptysis, change to breathing  Breast:  No pain, discharge, change in appearance or texture  Gastrointestinal:  No pain, cramping, jaundice, change to eating and bowel habits  Urinary:  No pain, bleeding or change in continence  Genitalia: No pain, bleeding or discharge  Musculoskeletal:  No redness, pain, edema or weakness  Skin:  No pruritus, rash, change to nodules or lesions  Neurologic:  No discomfort, change in mental status, speech, sensory or motor activity  Psychiatric:  No change in concentration or change to affect or mood  Endocrine:  No hot flashes, increased thirst, or change to urine production  Hematologic: No petechiae, ecchymosis or bleeding  Lymphatic:  No lymphadenopathy or lymphedema  Allergy / Immunologic:  No eczema, hives, frequent or recurrent infections    OBJECTIVE        Physical    VITALS:  Patient Vitals for the past 24 hrs:   BP Temp Temp src Pulse Resp SpO2 Height Weight   11/22/24 0539 -- -- -- -- 16 -- -- --   11/21/24 2338 123/80 97.5 °F (36.4 °C) -- 98 -- 100 % -- --   11/21/24 2000 126/74 97.7 °F (36.5 °C) Oral 92 16 100 % -- --   11/21/24 1425 116/70 -- -- 76 20 100 % -- --   11/21/24 1403 136/65 -- -- 83 16 100 % -- --   11/21/24 1355 (!) 142/64 -- -- 87 18 100 % -- --   11/21/24 1350 139/69 -- -- 84 19 100 % -- --   11/21/24 1345 133/67 -- -- 84 16 100 % -- --   11/21/24 1340 139/65 -- -- 82 18 100 % -- --   11/21/24 1335 138/65 -- -- 84 18 100 % -- --   11/21/24  2024    HISTORY:  ORDERING SYSTEM PROVIDED HISTORY: Evaluate biliary system  TECHNOLOGIST PROVIDED HISTORY:  Reason for exam:->Evaluate biliary system  Additional Contrast?->None  Decision Support Exception - unselect if not a suspected or confirmed  emergency medical condition->Emergency Medical Condition (MA)  Reason for Exam: eval liver for drain    FINDINGS:  Lower Chest: 9 mm pole noncalcified non cavitary pulmonary nodule in the left  base and a tiny 3 mm nodule in the right mid lung zone.  No other nodules are  identified.  No effusion infiltrate or pneumothorax.  The heart is grossly  normal in size.    Organs:    Liver: Again identified is enlarged liver with multiple varying sized  low-attenuation lesions throughout.  Some are confluent.  No obvious  intrahepatic ductal dilatation.    Gallbladder: The gallbladder is distended.  Mild gallbladder wall thickening.  The stones identified on the comparison MRI are not clearly seen on the CT  scan.  Slight prominence to the common bile duct of the common biliary stent  has migrated into the duodenum.    Pancreas: Normal    Adrenal glands: Normal    Kidneys: Simple appearing left renal cyst with a small indeterminate  low-attenuation lesion in the left midpole.  Tiny cyst in the right kidney.  No stone.    Spleen: Normal    GI/Bowel:    Stomach: Unremarkable    Small bowel: Unremarkable.    Colon: The: Is distended with stool especially within the rectum and sigmoid  which may represent constipation.  Suspect prior appendectomy with clips in  the region of the cecum.    Pelvis: The uterus is normal.  The bladder is normal.    Peritoneum/Retroperitoneum: A small amount of free fluid is identified in the  pelvis, right and left upper abdomen.  Anterior abdominal wall hernia.    Bones/Soft Tissues: No lytic or blastic lesion.  Impression: 1. The common biliary stent has migrated into the duodenum.  2. The gallbladder is distended.  3. The liver is enlarged with

## 2024-11-22 NOTE — CARE COORDINATION
Case Management Discharge Summary- Hospice Discharge    Destination:   Home with Hospice and family support     Hospice Agency name and contact: Hospice [facility] [phone]    Durable Equipment arrangements are completed by the Hospice nurse.    [place] DNR signed and placed in discharge packet AND patient's hard chart. (This is required for DNR patients.)    Signed ECOC/AVS faxed to above agency/facility.    Transportation arrangements per Hospice RN.  Transport agency name and  time: [address] [phone]    Transport form completed and signed by:  Writer   Transport form placed with discharge packet: Soft chart       Patient's RN notified of plan:JULIÁN Davis    Note:    Discharging nurse to complete nursing portion of ECOC, reconcile AVS, place final copy with patient's discharge packet.  RN to ensure signed prescriptions are sent home with patient or the facility as per nursing protocol.      .DONNY Jones

## 2024-11-25 ENCOUNTER — CARE COORDINATION (OUTPATIENT)
Dept: CARE COORDINATION | Age: 69
End: 2024-11-25

## 2024-11-25 ENCOUNTER — TELEPHONE (OUTPATIENT)
Dept: FAMILY MEDICINE CLINIC | Age: 69
End: 2024-11-25

## 2024-11-25 NOTE — CARE COORDINATION
Care Transitions Note    Initial Call - Call within 2 business days of discharge: Yes    Attempted to reach patient for transitions of care follow up. Unable to reach patient.    Outreach Attempts:   HIPAA compliant voicemail left for patient. Dc with hospice.     Patient: Georgi Ryan    Patient : 1955   MRN: [id number]    Reason for Admission: obstructive jaundice  Discharge Date: 24  RURS: Readmission Risk Score: 16.8    Last Discharge Facility       Date Complaint Diagnosis Description Type Department Provider    24 Fatigue; Jaundice Obstructive jaundice due to cancer (HCC) ... ED to Hosp-Admission (Discharged) (ADMITTED) Katherin Whitten MD; [facility].            Was this an external facility discharge? No    Follow Up Appointment:   Patient has hospital follow up appointment scheduled within 7 days of discharge.    Future Appointments         Provider Specialty Dept Phone    2024 8:00 AM Luisana Gunter PA Family Medicine [phone]            Plan for follow-up on next business day.      Chey Ann

## 2024-11-25 NOTE — TELEPHONE ENCOUNTER
Care Transitions Initial Follow Up Call    Outreach made within 2 business days of discharge: Yes    Patient: Georgi Ryan Patient : 1955   MRN: [id number]  Reason for Admission: Jaundice   Discharge Date: 24       Spoke with: Patient admitted to [facility], and sent home on hospice.  TCM not appropriate at this time.     Discharge department/facility: [facility]        Scheduled appointment with PCP within 7-14 days    Follow Up  Future Appointments   Date Time Provider Department Center   2024  8:00 AM Luisana Gunter PA [facility]       Brianne Zavala LPN

## (undated) DEVICE — SUTURE PERMAHAND SZ 3-0 L18IN NONABSORBABLE BLK L26MM SH C013D

## (undated) DEVICE — ELECTRODE ECG MONITR FOAM TEAR DROP ADLT RED

## (undated) DEVICE — SUTURE MCRYL + SZ 4-0 L18IN ABSRB UD L19MM PS-2 3/8 CIR MCP496G

## (undated) DEVICE — FORCEPS BX L240CM DIA2.4MM L NDL RAD JAW 4 133340

## (undated) DEVICE — GLOVE,SURG,SENSICARE SLT,LF,PF,7.5: Brand: MEDLINE

## (undated) DEVICE — Z DISCONTINUED USE 2276105 GOWN PROTCT UNIV CHST W28IN L49IN SL 24IN BLU SPUNBOND FLM

## (undated) DEVICE — STAPLER INT L75MM CUT LN L73MM STPL LN L77MM BLU B FRM 8

## (undated) DEVICE — DRAPE C ARM W46XL120IN XLN

## (undated) DEVICE — SYSTEM BX CAP BILI RAP EXCHG CAP LOK DEV COMPATIBLE W/ OLY

## (undated) DEVICE — Device

## (undated) DEVICE — LINER SUCT CANSTR 3000CC PLAS SFT PRE ASSEMB W/OUT TBNG W/

## (undated) DEVICE — RETRIEVAL BALLOON CATHETER: Brand: EXTRACTOR™ PRO RX

## (undated) DEVICE — TOTAL TRAY, DB, 100% SILI FOLEY, 16FR 10: Brand: MEDLINE

## (undated) DEVICE — SUTURE PROL 2-0 L48IN NONABSORBABLE BLU SH L26MM 1/2 CIR 8533H

## (undated) DEVICE — SUTURE PERMAHAND SZ 2-0 L18IN NONABSORBABLE BLK L26MM SH C012D

## (undated) DEVICE — GOWN,REINF,POLY,AURORA,XLNG/XXL,STRL: Brand: MEDLINE

## (undated) DEVICE — BRUSH CYTO L200CM SHTH 75FR NIT DRV WIRE SFT STIFF BRIST

## (undated) DEVICE — ENDOSCOPIC KIT 2 12 FT OP4 DE2 GWN SYR

## (undated) DEVICE — SUTURE MONOCRYL + SZ 4-0 L18IN ABSRB UD L19MM PS-2 3/8 CIR MCP496G

## (undated) DEVICE — NEEDLE 25GAX5.0MM INJ CARR LOCKE

## (undated) DEVICE — DECANTER: Brand: UNBRANDED

## (undated) DEVICE — LIQUIBAND RAPID ADHESIVE 36/CS 0.8ML: Brand: MEDLINE

## (undated) DEVICE — 10FR FRAZIER SUCTION HANDLE: Brand: CARDINAL HEALTH

## (undated) DEVICE — SOLUTION IV IRRIG POUR BRL 0.9% SODIUM CHL 2F7124

## (undated) DEVICE — ENDO CARRY-ON PROCEDURE KIT INCLUDES SUCTION TUBING, LUBRICANT, GAUZE, BIOHAZARD STICKER, TRANSPORT PAD AND INTERCEPT BEDSIDE KIT.: Brand: ENDO CARRY-ON PROCEDURE KIT

## (undated) DEVICE — SUTURE VICRYL + SZ 3-0 L18IN ABSRB UD SH 1/2 CIR TAPERCUT NDL VCP864D

## (undated) DEVICE — NEPTUNE E-SEP SMOKE EVACUATION PENCIL, COATED, 70MM BLADE, PUSH BUTTON SWITCH: Brand: NEPTUNE E-SEP

## (undated) DEVICE — SUTURE MCRYL SZ 4-0 L18IN ABSRB UD L19MM PS-2 3/8 CIR PRIM Y496G

## (undated) DEVICE — CANNULA NSL AD TBNG L7FT PVC STR NONFLARED PRNG O2 DEL W STD

## (undated) DEVICE — CONMED SCOPE SAVER BITE BLOCK, 20X27 MM: Brand: SCOPE SAVER

## (undated) DEVICE — COVER US PRB W12XL244CM SURGICAL INTRAOPERATIVE PLAS TAPR L

## (undated) DEVICE — CANNULATING SPHINCTEROTOME: Brand: JAGTOME™ REVOLUTION RX

## (undated) DEVICE — FORCEPS BX L240CM JAW DIA2.8MM L CAP W/ NDL MIC MESH TOOTH

## (undated) DEVICE — SEALER ENDOSCP NANO COAT OPN DIV CRV L JAW LIGASURE IMPACT

## (undated) DEVICE — MINOR SET UP MHAZ: Brand: MEDLINE INDUSTRIES, INC.

## (undated) DEVICE — CANNULA,OXY,ADULT,SUPERSOFT,W/7'TUB,SC: Brand: MEDLINE INDUSTRIES, INC.

## (undated) DEVICE — ADHESIVE SKIN CLSR 0.7ML TOP DERMBND ADV

## (undated) DEVICE — ELECTRODE,ECG,STRESS,FOAM,3PK: Brand: MEDLINE

## (undated) DEVICE — DRAPE,T,LAPARO,TRANS,STERILE: Brand: MEDLINE

## (undated) DEVICE — SUTURE VCRL + SZ 3-0 L18IN ABSRB UD SH 1/2 CIR TAPERCUT NDL VCP864D

## (undated) DEVICE — Device: Brand: SPOT EX ENDOSCOPIC TATTOO

## (undated) DEVICE — SUTURE PDS II SZ 0 L60IN ABSRB VLT L48MM CTX 1/2 CIR Z990G

## (undated) DEVICE — RELOAD STPL L75MM OPN H3.8MM CLS 1.5MM WIRE DIA0.2MM REG